# Patient Record
Sex: FEMALE | Race: WHITE | NOT HISPANIC OR LATINO | URBAN - METROPOLITAN AREA
[De-identification: names, ages, dates, MRNs, and addresses within clinical notes are randomized per-mention and may not be internally consistent; named-entity substitution may affect disease eponyms.]

---

## 2024-03-19 ENCOUNTER — OUTPATIENT (OUTPATIENT)
Dept: OUTPATIENT SERVICES | Facility: HOSPITAL | Age: 69
LOS: 1 days | Discharge: STILL ADMITTED OR ADMINISTRATIVE | End: 2024-03-19
Payer: MEDICARE

## 2024-03-19 PROCEDURE — 90839 PSYTX CRISIS INITIAL 60 MIN: CPT

## 2024-03-21 ENCOUNTER — INPATIENT (INPATIENT)
Facility: HOSPITAL | Age: 69
LOS: 35 days | Discharge: HOME | End: 2024-04-26
Attending: PSYCHIATRY & NEUROLOGY | Admitting: PSYCHIATRY & NEUROLOGY
Payer: MEDICARE

## 2024-03-21 VITALS
DIASTOLIC BLOOD PRESSURE: 80 MMHG | SYSTOLIC BLOOD PRESSURE: 139 MMHG | HEART RATE: 86 BPM | RESPIRATION RATE: 18 BRPM | TEMPERATURE: 98 F | OXYGEN SATURATION: 99 %

## 2024-03-21 DIAGNOSIS — F33.2 MAJOR DEPRESSIVE DISORDER, RECURRENT SEVERE WITHOUT PSYCHOTIC FEATURES: ICD-10-CM

## 2024-03-21 LAB
ALBUMIN SERPL ELPH-MCNC: 4.3 G/DL — SIGNIFICANT CHANGE UP (ref 3.3–5)
ALP SERPL-CCNC: 82 U/L — SIGNIFICANT CHANGE UP (ref 40–120)
ALT FLD-CCNC: 21 U/L — SIGNIFICANT CHANGE UP (ref 4–33)
AMPHET UR-MCNC: NEGATIVE — SIGNIFICANT CHANGE UP
ANION GAP SERPL CALC-SCNC: 12 MMOL/L — SIGNIFICANT CHANGE UP (ref 7–14)
APAP SERPL-MCNC: <10 UG/ML — LOW (ref 15–25)
APPEARANCE UR: CLEAR — SIGNIFICANT CHANGE UP
AST SERPL-CCNC: 19 U/L — SIGNIFICANT CHANGE UP (ref 4–32)
BARBITURATES UR SCN-MCNC: NEGATIVE — SIGNIFICANT CHANGE UP
BASOPHILS # BLD AUTO: 0.04 K/UL — SIGNIFICANT CHANGE UP (ref 0–0.2)
BASOPHILS NFR BLD AUTO: 0.7 % — SIGNIFICANT CHANGE UP (ref 0–2)
BENZODIAZ UR-MCNC: POSITIVE
BILIRUB SERPL-MCNC: 0.5 MG/DL — SIGNIFICANT CHANGE UP (ref 0.2–1.2)
BILIRUB UR-MCNC: NEGATIVE — SIGNIFICANT CHANGE UP
BUN SERPL-MCNC: 26 MG/DL — HIGH (ref 7–23)
CALCIUM SERPL-MCNC: 9.8 MG/DL — SIGNIFICANT CHANGE UP (ref 8.4–10.5)
CHLORIDE SERPL-SCNC: 106 MMOL/L — SIGNIFICANT CHANGE UP (ref 98–107)
CO2 SERPL-SCNC: 25 MMOL/L — SIGNIFICANT CHANGE UP (ref 22–31)
COCAINE METAB.OTHER UR-MCNC: NEGATIVE — SIGNIFICANT CHANGE UP
COLOR SPEC: SIGNIFICANT CHANGE UP
CREAT SERPL-MCNC: 1.04 MG/DL — SIGNIFICANT CHANGE UP (ref 0.5–1.3)
CREATININE URINE RESULT, DAU: 163 MG/DL — SIGNIFICANT CHANGE UP
DIFF PNL FLD: NEGATIVE — SIGNIFICANT CHANGE UP
EGFR: 59 ML/MIN/1.73M2 — LOW
EOSINOPHIL # BLD AUTO: 0.1 K/UL — SIGNIFICANT CHANGE UP (ref 0–0.5)
EOSINOPHIL NFR BLD AUTO: 1.7 % — SIGNIFICANT CHANGE UP (ref 0–6)
ETHANOL SERPL-MCNC: <10 MG/DL — SIGNIFICANT CHANGE UP
GLUCOSE SERPL-MCNC: 118 MG/DL — HIGH (ref 70–99)
GLUCOSE UR QL: NEGATIVE MG/DL — SIGNIFICANT CHANGE UP
HCT VFR BLD CALC: 36 % — SIGNIFICANT CHANGE UP (ref 34.5–45)
HGB BLD-MCNC: 12.6 G/DL — SIGNIFICANT CHANGE UP (ref 11.5–15.5)
IANC: 3.95 K/UL — SIGNIFICANT CHANGE UP (ref 1.8–7.4)
IMM GRANULOCYTES NFR BLD AUTO: 0.5 % — SIGNIFICANT CHANGE UP (ref 0–0.9)
KETONES UR-MCNC: ABNORMAL MG/DL
LEUKOCYTE ESTERASE UR-ACNC: NEGATIVE — SIGNIFICANT CHANGE UP
LYMPHOCYTES # BLD AUTO: 1.26 K/UL — SIGNIFICANT CHANGE UP (ref 1–3.3)
LYMPHOCYTES # BLD AUTO: 21.8 % — SIGNIFICANT CHANGE UP (ref 13–44)
MCHC RBC-ENTMCNC: 30.4 PG — SIGNIFICANT CHANGE UP (ref 27–34)
MCHC RBC-ENTMCNC: 35 GM/DL — SIGNIFICANT CHANGE UP (ref 32–36)
MCV RBC AUTO: 86.7 FL — SIGNIFICANT CHANGE UP (ref 80–100)
METHADONE UR-MCNC: POSITIVE
MONOCYTES # BLD AUTO: 0.41 K/UL — SIGNIFICANT CHANGE UP (ref 0–0.9)
MONOCYTES NFR BLD AUTO: 7.1 % — SIGNIFICANT CHANGE UP (ref 2–14)
NEUTROPHILS # BLD AUTO: 3.95 K/UL — SIGNIFICANT CHANGE UP (ref 1.8–7.4)
NEUTROPHILS NFR BLD AUTO: 68.2 % — SIGNIFICANT CHANGE UP (ref 43–77)
NITRITE UR-MCNC: NEGATIVE — SIGNIFICANT CHANGE UP
NRBC # BLD: 0 /100 WBCS — SIGNIFICANT CHANGE UP (ref 0–0)
NRBC # FLD: 0 K/UL — SIGNIFICANT CHANGE UP (ref 0–0)
OPIATES UR-MCNC: NEGATIVE — SIGNIFICANT CHANGE UP
OXYCODONE UR-MCNC: NEGATIVE — SIGNIFICANT CHANGE UP
PCP SPEC-MCNC: SIGNIFICANT CHANGE UP
PCP UR-MCNC: NEGATIVE — SIGNIFICANT CHANGE UP
PH UR: 6 — SIGNIFICANT CHANGE UP (ref 5–8)
PLATELET # BLD AUTO: 244 K/UL — SIGNIFICANT CHANGE UP (ref 150–400)
POTASSIUM SERPL-MCNC: 3.9 MMOL/L — SIGNIFICANT CHANGE UP (ref 3.5–5.3)
POTASSIUM SERPL-SCNC: 3.9 MMOL/L — SIGNIFICANT CHANGE UP (ref 3.5–5.3)
PROT SERPL-MCNC: 7 G/DL — SIGNIFICANT CHANGE UP (ref 6–8.3)
PROT UR-MCNC: NEGATIVE MG/DL — SIGNIFICANT CHANGE UP
RBC # BLD: 4.15 M/UL — SIGNIFICANT CHANGE UP (ref 3.8–5.2)
RBC # FLD: 11.9 % — SIGNIFICANT CHANGE UP (ref 10.3–14.5)
SALICYLATES SERPL-MCNC: <0.3 MG/DL — LOW (ref 15–30)
SARS-COV-2 RNA SPEC QL NAA+PROBE: SIGNIFICANT CHANGE UP
SODIUM SERPL-SCNC: 143 MMOL/L — SIGNIFICANT CHANGE UP (ref 135–145)
SP GR SPEC: 1.02 — SIGNIFICANT CHANGE UP (ref 1–1.03)
THC UR QL: NEGATIVE — SIGNIFICANT CHANGE UP
TOXICOLOGY SCREEN, DRUGS OF ABUSE, SERUM RESULT: SIGNIFICANT CHANGE UP
TSH SERPL-MCNC: 0.71 UIU/ML — SIGNIFICANT CHANGE UP (ref 0.27–4.2)
UROBILINOGEN FLD QL: 1 MG/DL — SIGNIFICANT CHANGE UP (ref 0.2–1)
WBC # BLD: 5.79 K/UL — SIGNIFICANT CHANGE UP (ref 3.8–10.5)
WBC # FLD AUTO: 5.79 K/UL — SIGNIFICANT CHANGE UP (ref 3.8–10.5)

## 2024-03-21 PROCEDURE — 99285 EMERGENCY DEPT VISIT HI MDM: CPT

## 2024-03-21 RX ORDER — CLONAZEPAM 1 MG
0.25 TABLET ORAL ONCE
Refills: 0 | Status: DISCONTINUED | OUTPATIENT
Start: 2024-03-21 | End: 2024-03-21

## 2024-03-21 RX ORDER — TRAZODONE HCL 50 MG
100 TABLET ORAL AT BEDTIME
Refills: 0 | Status: DISCONTINUED | OUTPATIENT
Start: 2024-03-21 | End: 2024-03-22

## 2024-03-21 RX ORDER — HYDROXYZINE HCL 10 MG
10 TABLET ORAL ONCE
Refills: 0 | Status: COMPLETED | OUTPATIENT
Start: 2024-03-21 | End: 2024-03-21

## 2024-03-21 RX ORDER — CLONAZEPAM 1 MG
1 TABLET ORAL THREE TIMES A DAY
Refills: 0 | Status: DISCONTINUED | OUTPATIENT
Start: 2024-03-21 | End: 2024-03-22

## 2024-03-21 RX ADMIN — Medication 10 MILLIGRAM(S): at 15:08

## 2024-03-21 RX ADMIN — Medication 0.25 MILLIGRAM(S): at 15:11

## 2024-03-21 RX ADMIN — Medication 100 MILLIGRAM(S): at 21:48

## 2024-03-21 RX ADMIN — Medication 10 MILLIGRAM(S): at 15:09

## 2024-03-21 RX ADMIN — Medication 1 MILLIGRAM(S): at 21:47

## 2024-03-21 NOTE — ED BEHAVIORAL HEALTH ASSESSMENT NOTE - HPI (INCLUDE ILLNESS QUALITY, SEVERITY, DURATION, TIMING, CONTEXT, MODIFYING FACTORS, ASSOCIATED SIGNS AND SYMPTOMS)
69 year old female, , no children, retired 3 years ago (was ) and domiciled with  in a private residence. PPH: Hx of Depression and LEO, engaged in outpatient tx with Dr Schmid x20+ years. No previous psychiatric admissions, denies previous suicide attempts, denies NSSIB. PMH: HLD, HTN, IBS, BONIFACIO has CPAP notes not used in last couple weeks due to recent cold. Allergies: Erythromycin. Pt denies substance/ETOH use. Pt denies legal issues and denies hx of violence/aggression. Denies access to firearms.    Family hx: mother with hx of anxiety and depression, niece with hx of psychiatric admission for anxiety and depression and another niece with anxiety and depression.    [facility], reporting worsening symptoms over the last year more so in the last couple of months,  reports she has been requesting medication adjustments with current psychiatrist due to feeling medications have not been effective.    Pt presents as alert and oriented x3. Presents with depressed mood, constricted affect. Fairly kept, appropriately dressed. Pt presents as restless, notes she is uncertain if she can sit in an emergency room at this time. Does not present with perceptual disturbance. Presents with fair insight and judgement.    Pt reports she has been increasingly anxious, perseverative, unable to relax, restless with difficulty showering; missing showers multiple x/week. Pt reports agoraphobia, anhedonia and disrupted sleep and appetite. Pt denies past and present suicidal and homicidal ideation, intent and plan. 69 year old female, , no children, retired 3 years ago (was ) and domiciled with  in a private residence. PPH: Hx of Depression and LEO, engaged in outpatient tx with Dr Schmid x20+ years. No previous psychiatric admissions, denies previous suicide attempts, denies NSSIB. PMH: HLD, HTN, IBS, BONIFACIO has CPAP, Pt denies substance/ETOH use. Pt denies legal issues and denies hx of violence/aggression. Denies access to firearms, referred by [facility] for poor functioning due to increased anxiety and depression.     At [facility], pt was seeking admission for worsening anxiety and depression over the last couple of months. She reported that current meds are not effective. Pt reported feeling restless.   Pt reported feeling increasingly anxious, perseverative, unable to relax, restless with difficulty showering; missing showers multiple x/week. Pt reported agoraphobia, anhedonia and disrupted sleep and appetite. Pt denied past and present suicidal and homicidal ideation, intent and plan.    On interview in ED, pt confirms the collateral above. She is unable to identify acute stressors. States she is anxious to the point where she is afraid to go outside. She is showering infrequently. She reports anhedonia. She has been taking prn xanax 2-3 times daily and prn klonopin 2-3 times daily. She reports full medication compliance. She denies SI/HI. She denies manic or psychotic symptoms. She feels she can't function at home.

## 2024-03-21 NOTE — ED BEHAVIORAL HEALTH ASSESSMENT NOTE - DESCRIPTION
see HPI in behavioral control  Vital Signs Last 24 Hrs  T(C): 36.6 (21 Mar 2024 12:40), Max: 36.6 (21 Mar 2024 12:40)  T(F): 97.9 (21 Mar 2024 12:40), Max: 97.9 (21 Mar 2024 12:40)  HR: 86 (21 Mar 2024 12:40) (86 - 86)  BP: 139/80 (21 Mar 2024 12:40) (139/80 - 139/80)  BP(mean): --  RR: 18 (21 Mar 2024 12:40) (18 - 18)  SpO2: 99% (21 Mar 2024 12:40) (99% - 99%)    Parameters below as of 21 Mar 2024 12:40  Patient On (Oxygen Delivery Method): room air

## 2024-03-21 NOTE — ED BEHAVIORAL HEALTH ASSESSMENT NOTE - ADDITIONAL DETAILS ALL
LMOM with details of staying hydrated    ----- Message from Isabell Lama MD sent at 4/21/2023 10:41 AM EDT -----  Regarding: FW: MRI RESULTS  Ok  advise him to stay well hydrated  ----- Message -----  From: Rebeca Shelby MA  Sent: 4/21/2023  10:07 AM EDT  To: Isabell Lama MD  Subject: RE: MRI RESULTS                                  PT WANTED YOU TO KNOW HE HAD AN MRI AT [facility] AND HE HAS A KIDNEY STONE 11MM. HE HAS A F/U WITH THEM ON Monday. HE IS NOT SURE IF THEY ARE DOING A PROCEDURE OR NOT. THEY DID ADVISE HIM IF HE HAS PAIN OVER THE WEEKEND TO GO TO THE ER. HE WANTED YOU TO BE IN THE LOOP. HE IS HAVING THE REPORT SENT OVER.   ----- Message -----  From: Lo Granda  Sent: 4/21/2023   9:24 AM EDT  To: Rebeca Shelby MA  Subject: MRI RESULTS                                      Patient phoned wanting to speak to someone about MRI results I made him aware that we did not have them pt still wanted to speak to you told him I would send you a message. Thank you          
denies

## 2024-03-21 NOTE — ED ADULT TRIAGE NOTE - CHIEF COMPLAINT QUOTE
c/o feeling anxious and depressed sent in form [facility] crisis clinic for admission., pt denies S/I, H/I or A/V/H.

## 2024-03-21 NOTE — ED BEHAVIORAL HEALTH ASSESSMENT NOTE - SUMMARY
69 year old female, , no children, retired 3 years ago (was ) and domiciled with  in a private residence. PPH: Hx of Depression and LEO, engaged in outpatient tx with Dr Schmid x20+ years. No previous psychiatric admissions, denies previous suicide attempts, denies NSSIB. PMH: HLD, HTN, IBS, BONIFACIO has CPAP, Pt denies substance/ETOH use. Pt denies legal issues and denies hx of violence/aggression. Denies access to firearms, referred by [facility] for poor functioning due to increased anxiety and depression. Pt requests and meets criteria for voluntary psychiatric admission for stabilization.

## 2024-03-21 NOTE — ED ADULT NURSE NOTE - OBJECTIVE STATEMENT
Hx: depression/anxiety. Pt from [facility] crisis center. Pt endorsing desire for voluntary admission due to extreme anxiety. Pt not caring for self appropriately, isolating to her home due to fear of being too weak to walk outside like her and her  used to. Pt calm and cooperative, NAD, reps equal and unlabored. Denies; SIB, S/I, H/I, hallucinations, insomnia, ETOH/drug use.

## 2024-03-21 NOTE — ED ADULT NURSE NOTE - CHIEF COMPLAINT QUOTE
c/o feeling anxious and depressed sent in form [facility] crisis clinic for admission., pt denies S/I, H/I or A/V/H.

## 2024-03-21 NOTE — ED PROVIDER NOTE - NSFOLLOWUPINSTRUCTIONS_ED_ALL_ED_FT
Follow up with your PMD within 48-72 hrs. Show copies of your reports given to you.   Worsening, continued or new concerning symptoms return to the emergency department.    You have been given information necessary to follow up with the  [facility] ([facility]) Crisis center & other outpatient  psychiatric clinics within your community    • [facility] walk in Crisis centre  [address] [address]  [phone] [email or web address]  Hours of operation:  Day	                                        Hours  Sunday                                  Closed  Monday                                9am - 3pm  Tuesday                                9am - 3pm  Wednesday                          9am - 3pm  Thursday                               9am - 3pm  Friday                                    9am - 3pm  Saturday                                Closed

## 2024-03-21 NOTE — ED PROVIDER NOTE - PROGRESS NOTE DETAILS
MAGALY: Pt was signed out to me pending admission/transfer to an inpatient psych facility. Pt has no complaints. Will continue to monitor pending bed availability. DD ED ATTG: rec'd s/o from Dr Mclain.  69F sent from [facility] walk in due to not functioning well due to psych issues.  Plan admission.  Psych accepted pt

## 2024-03-21 NOTE — ED BEHAVIORAL HEALTH ASSESSMENT NOTE - DETAILS
denies informed  mother with hx of anxiety and depression, niece with hx of psychiatric admission for anxiety and depression and another niece with anxiety and depression. per protocol

## 2024-03-21 NOTE — ED BEHAVIORAL HEALTH ASSESSMENT NOTE - CURRENT MEDICATION
Rexulti 1.5 mg OD, Alprazolam .25 mg PRN TID, Clonazepam .5mg 1 and 1/2 tab PRN daily, Buspirone 10 mg TID, Hydroxyzine 10 mg BID, Trazodone 100 mg at bed time, Trintellix 10 mg at bed time.    Amlodipine 5 mg, OD, Aspirin 81 mg, Losartan 100 mg OD, Hydrochlorothiazide 25 mg OD, Pravastatin 20 mg OD, B complex with vitamin C and multivitamin. Rexulti 1.5 mg po daily, Alprazolam 0.25 mg TID, Clonazepam 0.75 mg po TID, Buspirone 10 mg po TID, Hydroxyzine 10 mg po BID, Trazodone 100 mg po QHS, Trintellix 10 mg po QHS  Amlodipine 5 mg po daily, Aspirin 81 mg po daily, Losartan 100 mg po daily, Hydrochlorothiazide 25 mg po daily, Pravastatin 20 mg po daily, B complex with vitamin C and multivitamin. Rexulti 1.5 mg po daily, Alprazolam 0.25 mg TID, Clonazepam 0.75 mg po TID, Buspirone 10 mg po afternoon, Buspirone 20 mg po daily, Hydroxyzine 10 mg po TID, Trazodone 100 mg po QHS, Trintellix 10 mg po QHS  Amlodipine 5 mg po daily, Aspirin 81 mg po daily, Losartan 100 mg po daily, Hydrochlorothiazide 25 mg po daily, Pravastatin 20 mg po daily, B complex with vitamin C and multivitamin.

## 2024-03-21 NOTE — ED PROVIDER NOTE - CLINICAL SUMMARY MEDICAL DECISION MAKING FREE TEXT BOX
68 yo F PMH MDD, LEO p/w increased anxiety and feelings of depression expressing she is unable to function. Sees her psychiatrist x1 year and has been compliant every month. Reports compliance with medications but doesn't help. Denies fever, headache, dizziness, SI/HI/AH/VH, chills, chest pain, shortness of breath, abdominal pain, sick contact or recent travel. Denies alcohol use or other drugs.   Labs, EKG, COVID  SW collateral  Psych consult  Dispo as per collateral

## 2024-03-21 NOTE — ED ADULT NURSE REASSESSMENT NOTE - NS ED NURSE REASSESS COMMENT FT1
break coverage rn. received report from LUIS holloway. A&Ox4, ambulatory. denies complaints at this time. safety maintained

## 2024-03-21 NOTE — ED BEHAVIORAL HEALTH ASSESSMENT NOTE - NSBHMSEKNOW_PSY_A_CORE
DISPLAY PLAN FREE TEXT Patient: Aneesh Alexander    Procedure Summary     Date:  02/07/20 Room / Location:  [facility] OR [address] [place] [address] PAD OR    Anesthesia Start:  0911 Anesthesia Stop:  1029    Procedure:  EXCISION BASAL CELL CARCINOMA - FOREHEAD; RIGHT CHEEK WITH FROZEN SECTION AND RECONSTRUCTION  AND LEFT EAR LESION EXCION (Bilateral ) Diagnosis:  (BASAL CELL CARCINOMA - FOREHEAD; RIGHT CHEEK; LEFT EAR)    Surgeon:  Leodan Jones MD Provider:  Leodan Oakley CRNA    Anesthesia Type:  general ASA Status:  2          Anesthesia Type: general    Vitals  Vitals Value Taken Time   /66 2/7/2020 10:35 AM   Temp 97.5 °F (36.4 °C) 2/7/2020 10:27 AM   Pulse 72 2/7/2020 10:38 AM   Resp 16 2/7/2020 10:31 AM   SpO2 95 % 2/7/2020 10:38 AM   Vitals shown include unvalidated device data.        Anesthesia Post Evaluation   Normal

## 2024-03-21 NOTE — ED BEHAVIORAL HEALTH NOTE - BEHAVIORAL HEALTH NOTE
As per provider, worker called patient’s spouse Kevin ([phone]) for collateral information. All information is as per pt’s kevin:    Patient is a 69-year-old female, domiciled with spouse, with a hx of depression and anxiety, bib as walk in for worsening depression and anxiety. Kevin states that he called the [facility] [place] earlier this morning and was informed that there was a geriatric bed for patient for admission, but it could not be held. He states that he brought the patient to the crisis center for admission and the patient met with a  who said there was no bed. He states that the patient’s doctor recommended that the patient come in for admission. He states that the patient is connected to psychiatrist Dr. dariel ayala ([phone]/beeper [phone]). He states that the patient has never had any past psychiatric admissions and is retired. He states that the patient has no past SA/SI/HI/AH/VH. Patient is not violence or aggression. Patient has been sleeping and her eating has recently come back to baseline. Patient is very nervous and anxious. Patient is not using any drugs or alcohol. Patient has no medical issues. Patient will not go for a haircut because she is too nervous. Patient has sleep apnea and uses her cpap on and off. Patient last used the cpap a month ago. He states he provided a note with medications to the nurse. Patient’s outpatient psychiatrist has been adjusting her medications and it is not working for the patient. He states that has been worsened for the last 3-4 days. He states that the patient has been staring at the Television and does not want to go outside. Patient has a family history of mental illness – her mother, aunt, and two nieces who all suffer with anxiety. Patient has no access to guns. No beds at [facility].

## 2024-03-21 NOTE — ED BEHAVIORAL HEALTH ASSESSMENT NOTE - PSYCHIATRIC ISSUES AND PLAN (INCLUDE STANDING AND PRN MEDICATION)
Discontinue Xanax and increase Klonopin to 1 mg po TID; defer other medication changes to inpatient team; continue Rexulti 1.5 mg po daily, Buspirone 10 mg po TID, Hydroxyzine 10 mg po BID, Trazodone 100 mg po QHS, Trintellix 10 mg po QHS Discontinue Xanax and increase Klonopin to 1 mg po TID; defer other medication changes to inpatient team; continue Rexulti 1.5 mg po daily, Buspirone 10 mg po qafternoon, Buspirone 20 mg po daily, Hydroxyzine 10 mg po TID, Trazodone 100 mg po QHS, Trintellix 10 mg po QHS

## 2024-03-22 DIAGNOSIS — F41.9 ANXIETY DISORDER, UNSPECIFIED: ICD-10-CM

## 2024-03-22 DIAGNOSIS — F32.9 MAJOR DEPRESSIVE DISORDER, SINGLE EPISODE, UNSPECIFIED: ICD-10-CM

## 2024-03-22 PROCEDURE — 99212 OFFICE O/P EST SF 10 MIN: CPT | Mod: GC

## 2024-03-22 RX ORDER — HYDROXYZINE HCL 10 MG
10 TABLET ORAL THREE TIMES A DAY
Refills: 0 | Status: DISCONTINUED | OUTPATIENT
Start: 2024-03-22 | End: 2024-03-22

## 2024-03-22 RX ORDER — MAGNESIUM HYDROXIDE 400 MG/1
30 TABLET, CHEWABLE ORAL DAILY
Refills: 0 | Status: DISCONTINUED | OUTPATIENT
Start: 2024-03-22 | End: 2024-04-26

## 2024-03-22 RX ORDER — TRAZODONE HCL 50 MG
100 TABLET ORAL AT BEDTIME
Refills: 0 | Status: DISCONTINUED | OUTPATIENT
Start: 2024-03-22 | End: 2024-04-26

## 2024-03-22 RX ORDER — BREXPIPRAZOLE 0.25 MG/1
1.5 TABLET ORAL DAILY
Refills: 0 | Status: DISCONTINUED | OUTPATIENT
Start: 2024-03-22 | End: 2024-04-11

## 2024-03-22 RX ORDER — HYDROXYZINE HCL 10 MG
25 TABLET ORAL ONCE
Refills: 0 | Status: COMPLETED | OUTPATIENT
Start: 2024-03-22 | End: 2024-03-22

## 2024-03-22 RX ORDER — CLONAZEPAM 1 MG
1 TABLET ORAL THREE TIMES A DAY
Refills: 0 | Status: DISCONTINUED | OUTPATIENT
Start: 2024-03-22 | End: 2024-03-28

## 2024-03-22 RX ORDER — HYDROXYZINE HCL 10 MG
25 TABLET ORAL THREE TIMES A DAY
Refills: 0 | Status: DISCONTINUED | OUTPATIENT
Start: 2024-03-22 | End: 2024-04-04

## 2024-03-22 RX ORDER — ACETAMINOPHEN 500 MG
650 TABLET ORAL EVERY 6 HOURS
Refills: 0 | Status: DISCONTINUED | OUTPATIENT
Start: 2024-03-22 | End: 2024-04-26

## 2024-03-22 RX ADMIN — Medication 20 MILLIGRAM(S): at 07:37

## 2024-03-22 RX ADMIN — Medication 1 MILLIGRAM(S): at 20:25

## 2024-03-22 RX ADMIN — Medication 100 MILLIGRAM(S): at 20:26

## 2024-03-22 RX ADMIN — Medication 25 MILLIGRAM(S): at 20:26

## 2024-03-22 RX ADMIN — Medication 25 MILLIGRAM(S): at 15:35

## 2024-03-22 RX ADMIN — Medication 1 MILLIGRAM(S): at 06:09

## 2024-03-22 NOTE — BH INPATIENT PSYCHIATRY ASSESSMENT NOTE - NSICDXPASTMEDICALHX_GEN_ALL_CORE_FT
PAST MEDICAL HISTORY:  History of retinal hemorrhage     Hyperlipidemia     Hypertension     Irritable bowel disease     MDD (major depressive disorder)     Obstructive sleep apnea

## 2024-03-22 NOTE — BH INPATIENT PSYCHIATRY ASSESSMENT NOTE - OTHER PAST PSYCHIATRIC HISTORY (INCLUDE DETAILS REGARDING ONSET, COURSE OF ILLNESS, INPATIENT/OUTPATIENT TREATMENT)
Patient reports history of anxiety and depression. States she has been in "remission" for years. Currently in outpatient treatment with psychiatrist Chester Schmid ([phone]) and therapist Cole Romero ([phone]) who she sees virtually. Denies inpatient psychiatric hospitalizations. Denies history of suicide attempts or self-injurious behavior. No history of AVH. No legal history. No known history of violence. Denies history of trauma. No access to weapons/firearms.

## 2024-03-22 NOTE — ED ADULT NURSE REASSESSMENT NOTE - NS ED NURSE REASSESS COMMENT FT1
Received report from night RN pt calm c/o depression denies si/hi/avh presently, pt awaiting bed assignment eval on going.

## 2024-03-22 NOTE — ED BEHAVIORAL HEALTH PROGRESS NOTE - NSBHATTESTCOMMENTATTENDFT_PSY_A_CORE
69 year old female, , no children, retired 3 years ago (was ) and domiciled with  in a private residence. PPH: Hx of Depression and LEO, engaged in outpatient tx with Dr Schmid x20+ years. No previous psychiatric admissions, denies previous suicide attempts, denies NSSIB. PMH: HLD, HTN, IBS, BONIFACIO has CPAP, Pt denies substance/ETOH use. Pt denies legal issues and denies hx of violence/aggression. Denies access to firearms, referred by [facility] for poor functioning due to increased anxiety and depression. Pt requests and meets criteria for voluntary psychiatric admission for stabilization.

## 2024-03-22 NOTE — BH INPATIENT PSYCHIATRY ASSESSMENT NOTE - ACCESS TO FIREARM
MD ATTESTATION NOTE    SHARED VISIT: This visit was performed by BOTH a physician and an APC. The substantive portion of the medical decision making was performed by this attesting physician who made or approved the management plan and takes responsibility for patient management. All studies documented in the APC note (if performed) were independently interpreted by me.    The DELMY and I have discussed this patient's history, physical exam, MDM, and treatment plan.  I have reviewed the documentation and personally had a face to face interaction with the patient. The attached note describes my personal findings.      Bayron Acevedo is a 86 y.o. male who presents to the ED c/o acute hematuria. Has a hernia repair on Tuesday. Almodovar came out yesterday. Had urinary bleeding today. Otherwise has no complaints.     On exam:  GENERAL: not distressed  HENT: nares patent  EYES: no scleral icterus  CV: regular rhythm, regular rate  RESPIRATORY: normal effort  ABDOMEN: soft, nontender, well healing laparoscopic incisions  MUSCULOSKELETAL: no deformity  NEURO: alert, moves all extremities, follows commands  SKIN: warm, dry    Labs  Recent Results (from the past 24 hour(s))   Comprehensive Metabolic Panel    Collection Time: 03/09/24  1:05 PM    Specimen: Blood   Result Value Ref Range    Glucose 101 (H) 65 - 99 mg/dL    BUN 24 (H) 8 - 23 mg/dL    Creatinine 1.70 (H) 0.76 - 1.27 mg/dL    Sodium 139 136 - 145 mmol/L    Potassium 3.8 3.5 - 5.2 mmol/L    Chloride 99 98 - 107 mmol/L    CO2 27.3 22.0 - 29.0 mmol/L    Calcium 9.0 8.6 - 10.5 mg/dL    Total Protein 6.8 6.0 - 8.5 g/dL    Albumin 4.2 3.5 - 5.2 g/dL    ALT (SGPT) 6 1 - 41 U/L    AST (SGOT) 16 1 - 40 U/L    Alkaline Phosphatase 87 39 - 117 U/L    Total Bilirubin 0.5 0.0 - 1.2 mg/dL    Globulin 2.6 gm/dL    A/G Ratio 1.6 g/dL    BUN/Creatinine Ratio 14.1 7.0 - 25.0    Anion Gap 12.7 5.0 - 15.0 mmol/L    eGFR 39.0 (L) >60.0 mL/min/1.73   Urinalysis With Microscopic If Indicated  (No Culture) - Urine, Clean Catch    Collection Time: 03/09/24  1:05 PM    Specimen: Urine, Clean Catch   Result Value Ref Range    Color, UA Red (A) Yellow, Straw    Appearance, UA Clear Clear    pH, UA 6.0 5.0 - 8.0    Specific Gravity, UA 1.006 1.005 - 1.030    Glucose, UA Negative Negative    Ketones, UA Negative Negative    Bilirubin, UA Negative Negative    Blood, UA Large (3+) (A) Negative    Protein, UA Trace (A) Negative    Leuk Esterase, UA Trace (A) Negative    Nitrite, UA Negative Negative    Urobilinogen, UA 0.2 E.U./dL 0.2 - 1.0 E.U./dL   CBC Auto Differential    Collection Time: 03/09/24  1:05 PM    Specimen: Blood   Result Value Ref Range    WBC 7.20 3.40 - 10.80 10*3/mm3    RBC 4.66 4.14 - 5.80 10*6/mm3    Hemoglobin 11.8 (L) 13.0 - 17.7 g/dL    Hematocrit 38.0 37.5 - 51.0 %    MCV 81.5 79.0 - 97.0 fL    MCH 25.3 (L) 26.6 - 33.0 pg    MCHC 31.1 (L) 31.5 - 35.7 g/dL    RDW 16.6 (H) 12.3 - 15.4 %    RDW-SD 48.5 37.0 - 54.0 fl    MPV 10.2 6.0 - 12.0 fL    Platelets 146 140 - 450 10*3/mm3    Neutrophil % 70.1 42.7 - 76.0 %    Lymphocyte % 12.2 (L) 19.6 - 45.3 %    Monocyte % 7.6 5.0 - 12.0 %    Eosinophil % 9.4 (H) 0.3 - 6.2 %    Basophil % 0.4 0.0 - 1.5 %    Immature Grans % 0.3 0.0 - 0.5 %    Neutrophils, Absolute 5.04 1.70 - 7.00 10*3/mm3    Lymphocytes, Absolute 0.88 0.70 - 3.10 10*3/mm3    Monocytes, Absolute 0.55 0.10 - 0.90 10*3/mm3    Eosinophils, Absolute 0.68 (H) 0.00 - 0.40 10*3/mm3    Basophils, Absolute 0.03 0.00 - 0.20 10*3/mm3    Immature Grans, Absolute 0.02 0.00 - 0.05 10*3/mm3    nRBC 0.0 0.0 - 0.2 /100 WBC   Urinalysis, Microscopic Only - Urine, Clean Catch    Collection Time: 03/09/24  1:05 PM    Specimen: Urine, Clean Catch   Result Value Ref Range    RBC, UA Too Numerous to Count (A) None Seen, 0-2 /HPF    WBC, UA 3-5 (A) None Seen, 0-2 /HPF    Bacteria, UA None Seen None Seen /HPF    Squamous Epithelial Cells, UA 0-2 None Seen, 0-2 /HPF    Hyaline Casts, UA 0-2 None Seen /LPF     Methodology Automated Microscopy        Radiology  CT Abdomen Pelvis Without Contrast    Result Date: 3/9/2024  CT ABDOMEN PELVIS WO CONTRAST-  INDICATIONS: Hematuria. Recent hernia repair surgery.  TECHNIQUE: Radiation dose reduction techniques were utilized, including automated exposure control and exposure modulation based on body size. Unenhanced ABDOMEN AND PELVIS CT  COMPARISON: 1/23/2024  FINDINGS:  Small free intraperitoneal gas, for example axial image 35, although nonspecific, is probably related to the recent surgery. Fluid and gas are seen in the right inguinal hernia. A left inguinal hernia fat is noted.  Numerous colonic diverticula are seen that do not appear inflamed.    Exophytic hyperdense lesion of the left kidney appears stable. Small nonobstructive left renal hilar calcification may be arterial in location, no hydronephrosis. Asymmetric right renal atrophy is noted.  Small gas in the urinary bladder could be from catheterization or could be evidence of urinary infection. The prostate is enlarged, 5.4 cm.  Otherwise unremarkable appearance of the liver, spleen, adrenal glands, pancreas, kidneys, bladder.  No bowel obstruction or abnormal bowel thickening is identified.  No free intraperitoneal gas or free fluid.  Scattered small mesenteric and para-aortic lymph nodes are seen that are not significant by size criteria.  Abdominal aorta is aneurysmal, 5.6 cm on axial image 55, with aortobiiliac stents in place and (patency of which is not characterized without intravenous contrast material). Aortic and other arterial calcifications are present.  The lung bases show atelectasis/scarring with more consolidative opacity in the left lower lung, but appears grossly similar from 1/23/2024, though more prominent from 9/20/2023, follow-up advised as indicated to characterize continued stability. The heart size is borderline.  Degenerative changes are seen in the spine. No acute fracture is identified.           1. Small obstructive left renal calcification, may be arterial in location. No hydronephrosis. Gas in the urinary bladder, correlate clinically. In a patient this age with hematuria, follow-up evaluation with CT urogram is advised if not contraindicated to exclude possibility of an occult urinary lesion.  2. Colonic diverticulosis. No acute inflammatory process of bowel identified. Mild free intraperitoneal gas, probably related to recent surgery, but correlate clinically.  3. Atelectasis/scarring with more consolidative opacity in the left lower lung, but appears grossly similar from 1/23/2024, though more prominent from 9/20/2023, follow-up advised as indicated to characterize continued stability.  This report was finalized on 3/9/2024 3:32 PM by Dr. Bruce Johnson M.D on Workstation: [id number]       Medications given in the ED:  Medications - No data to display    Orders placed during this visit:  Orders Placed This Encounter   Procedures    CT Abdomen Pelvis Without Contrast    Comprehensive Metabolic Panel    Urinalysis With Microscopic If Indicated (No Culture) - Urine, Clean Catch    CBC Auto Differential    Urinalysis, Microscopic Only - Urine, Clean Catch    CBC & Differential       Medical Decision Making:  ED Course as of 03/09/24 2105   Sat Mar 09, 2024   1319 86-year-old male who presents today with hematuria.  He has had 2 Almodovar catheter placed on Tuesday and removed yesterday.  Noted hematuria last night and today.  We will check labs, urinalysis and a CT of the abdomen pelvis.  This very well could be related to trauma from the Almodovar catheter insertion since they did take multiple attempts. [MS]   1417 Reviewed pt's history and workup with Dr. Masters.  After a bedside evaluation, he agrees with the plan of care.     [MS]   1417 BUN(!): 24 [MS]   1417 Creatinine(!): 1.70  Bun & creatinine are baseline for patient [MS]   1556 Patient updated on workup done today which showed no acute  abnormalities.  Patient has voided on several occasions while in the ER and his urine is now a light yellow color and patient feels much better.  He will be discharged home with close follow-up with his primary care physician and urologist.  He was given strict return to ER precautions. [MS]      ED Course User Index  [MS] RodriguezAlina womack, APRN       Differential diagnosis:  UTI, Almodovar catheter trauma, ureteral stone      Incidental nodule identified on CT. He already is seen by Dr. Santos. PET scan ordered at the end of January.     Diagnosis  Final diagnoses:   Hematuria, unspecified type   Chronic kidney disease, unspecified CKD stage          Mario Masters II, MD  03/09/24 3257     No

## 2024-03-22 NOTE — BH PATIENT PROFILE - FUNCTIONAL ASSESSMENT - BASIC MOBILITY 4.
4 = No assist / stand by assistance PROVIDER:[TOKEN:[[id number]]],PROVIDER:[TOKEN:[[id number]]]

## 2024-03-22 NOTE — BH INPATIENT PSYCHIATRY ASSESSMENT NOTE - CURRENT MEDICATION
MEDICATIONS  (STANDING):  brexpiprazole 1.5 milliGRAM(s) Oral daily  busPIRone 20 milliGRAM(s) Oral <User Schedule>  busPIRone 10 milliGRAM(s) Oral <User Schedule>  clonazePAM  Tablet 1 milliGRAM(s) Oral three times a day  hydrOXYzine hydrochloride 10 milliGRAM(s) Oral three times a day  traZODone 100 milliGRAM(s) Oral at bedtime    MEDICATIONS  (PRN):  LORazepam   Injectable 2 milliGRAM(s) IntraMuscular Once PRN severe agitation   MEDICATIONS  (STANDING):  brexpiprazole 1.5 milliGRAM(s) Oral daily  busPIRone 20 milliGRAM(s) Oral <User Schedule>  busPIRone 10 milliGRAM(s) Oral <User Schedule>  clonazePAM  Tablet 1 milliGRAM(s) Oral three times a day  hydrOXYzine hydrochloride 25 milliGRAM(s) Oral three times a day  traZODone 100 milliGRAM(s) Oral at bedtime    MEDICATIONS  (PRN):  acetaminophen     Tablet .. 650 milliGRAM(s) Oral every 6 hours PRN Mild Pain (1 - 3), Moderate Pain (4 - 6), Severe Pain (7 - 10)  aluminum hydroxide/magnesium hydroxide/simethicone Suspension 30 milliLiter(s) Oral every 6 hours PRN Dyspepsia  LORazepam     Tablet 1 milliGRAM(s) Oral every 6 hours PRN Severe Anxiety/Agitation  LORazepam     Tablet 0.5 milliGRAM(s) Oral every 6 hours PRN Anxiety  LORazepam   Injectable 2 milliGRAM(s) IntraMuscular Once PRN severe agitation  magnesium hydroxide Suspension 30 milliLiter(s) Oral daily PRN Constipation

## 2024-03-22 NOTE — BH INPATIENT PSYCHIATRY ASSESSMENT NOTE - DESCRIPTION
69-year-old female, , no children/dependents. Domiciled in private residence with . Patient retired ~2-3 years ago (unable to specify) and was previously employed as a . Also used to work as a volunteer at [employer].

## 2024-03-22 NOTE — ED BEHAVIORAL HEALTH PROGRESS NOTE - PSYCHIATRIC ISSUES AND PLAN (INCLUDE STANDING AND PRN MEDICATION)
Discontinue Xanax and increase Klonopin to 1 mg po TID; defer other medication changes to inpatient team; continue Rexulti 1.5 mg po daily, Buspirone 10 mg po afternoon, Buspirone 20 mg po daily, Hydroxyzine 10 mg po TID, Trazodone 100 mg po QHS, Trintellix 10 mg po QHS

## 2024-03-22 NOTE — BH INPATIENT PSYCHIATRY ASSESSMENT NOTE - NSSUICPROTFACT_PSY_ALL_CORE
Responsibility to children, family, or others/Supportive social network of family or friends/Positive therapeutic relationships/Anglican beliefs

## 2024-03-22 NOTE — BH INPATIENT PSYCHIATRY ASSESSMENT NOTE - NSBHATTESTCOMMENTATTENDFT_PSY_A_CORE
Care was discussed and reviewed in the interdisciplinary treatment team.  I, Nuria Lanza MD, have reviewed and verified the documentation.  I independently performed the documented medical decision making.    Patient was seen and evaluated with the NP and NP student. Patient reported that she has been feeling sad and depressed. She verbalized trying multiple medications with no success. Patient said that she has not bee able to attend to her ADls or safely care for her self.  Patient said that for months she has not been able to use her CPAP. At this time patient said that she is not going to use the CPAP here in the unit. At this time she is in agreement with stating voluntarily and she is open to make medication changes.

## 2024-03-22 NOTE — ED BEHAVIORAL HEALTH PROGRESS NOTE - SUMMARY
Patient referred by [facility] for worsening anxiety and depression over the last couple of months. She reported that current meds are not effective. Pt reported feeling restless. Pt reported feeling increasingly anxious, perseverative, unable to relax, restless with difficulty showering; missing showers multiple x/week. Pt reported agoraphobia, anhedonia and disrupted sleep and appetite. Pt denied past and present suicidal and homicidal ideation, intent and plan Pt requests and meets criteria for voluntary psychiatric admission for stabilization.

## 2024-03-22 NOTE — ED BEHAVIORAL HEALTH PROGRESS NOTE - DETAILS:
Patient calm and cooperative. Continues to endorse depressed mood and anxiety but states anxiety is slight better. Patient states triggers for her current mood is recent death of mother and illness of her dog. Patient reports sleeping few hours. but reports adequate appetite. She denies any self-harm thoughts, passive or active SI/HI. Patient continues to be amenable for inpatient admission, awaiting for bed availability.

## 2024-03-22 NOTE — BH INPATIENT PSYCHIATRY ASSESSMENT NOTE - NSBHCHARTREVIEWVS_PSY_A_CORE FT
Vital Signs Last 24 Hrs  T(C): 36.7 (03-22-24 @ 12:45), Max: 37.1 (03-21-24 @ 23:15)  T(F): 98 (03-22-24 @ 12:45), Max: 98.8 (03-21-24 @ 23:15)  HR: 88 (03-22-24 @ 12:14) (82 - 99)  BP: 147/88 (03-22-24 @ 12:14) (130/82 - 147/88)  BP(mean): --  RR: 18 (03-22-24 @ 12:45) (16 - 18)  SpO2: 98% (03-22-24 @ 12:45) (98% - 99%)    Orthostatic VS  03-22-24 @ 12:45  Lying BP: --/-- HR: --  Sitting BP: 147/92 HR: 106  Standing BP: 138/87 HR: 112  Site: upper left arm  Mode: electronic   Vital Signs Last 24 Hrs  T(C): 36.7 (03-22-24 @ 12:45), Max: 37.1 (03-21-24 @ 23:15)  T(F): 98 (03-22-24 @ 12:45), Max: 98.8 (03-21-24 @ 23:15)  HR: 88 (03-22-24 @ 12:14) (82 - 99)  BP: 147/88 (03-22-24 @ 12:14) (130/82 - 147/88)  BP(mean): --  RR: 18 (03-22-24 @ 12:45) (17 - 18)  SpO2: 98% (03-22-24 @ 12:45) (98% - 99%)    Orthostatic VS  03-22-24 @ 12:45  Lying BP: --/-- HR: --  Sitting BP: 147/92 HR: 106  Standing BP: 138/87 HR: 112  Site: upper left arm  Mode: electronic

## 2024-03-22 NOTE — ED BEHAVIORAL HEALTH PROGRESS NOTE - STANDING MEDS RECEIVED IN ED DETAILS FREE TEXT
Buspirone 10 mg po afternoon, Buspirone 20 mg po daily, Hydroxyzine 10 mg po TID, Trazodone 100 mg po QHS

## 2024-03-22 NOTE — ED BEHAVIORAL HEALTH PROGRESS NOTE - CASE SUMMARY/FORMULATION (CLEARLY DOCUMENT RATIONALE FOR DISPOSITION CHANGE)
69 year old female, , no children, retired 3 years ago (was ) and domiciled with  in a private residence. PPH: Hx of Depression and LEO, engaged in outpatient tx with Dr Schmid x20+ years. No previous psychiatric admissions, denies previous suicide attempts, denies NSSIB. PMH: HLD, HTN, IBS, BONIFACIO has CPAP, Pt denies substance/ETOH use. Pt denies legal issues and denies hx of violence/aggression. Denies access to firearms, referred by [facility] for poor functioning due to increased anxiety and depression. Pt requests and meets criteria for voluntary psychiatric admission for stabilization.    Plan:  - Admit patient 9.13  - Discontinue Xanax and increase Klonopin to 1 mg po TID; defer other medication changes to inpatient team; continue Rexulti 1.5 mg po daily, Buspirone 10 mg po afternoon, Buspirone 20 mg po daily, Hydroxyzine 10 mg po TID, Trazodone 100 mg po QHS, Trintellix 10 mg po QHS  - Amlodipine 5 mg po daily, Aspirin 81 mg po daily, Losartan 100 mg po daily, Hydrochlorothiazide 25 mg po daily, Pravastatin 20 mg po daily, B complex with vitamin C and multivitamin.

## 2024-03-22 NOTE — ED BEHAVIORAL HEALTH PROGRESS NOTE - MEDICAL ISSUES AND PLAN (INCLUDE STANDING AND PRN MEDICATION)
Amlodipine 5 mg po daily, Aspirin 81 mg po daily, Losartan 100 mg po daily, Hydrochlorothiazide 25 mg po daily, Pravastatin 20 mg po daily, B complex with vitamin C and multivitamin.

## 2024-03-22 NOTE — BH INPATIENT PSYCHIATRY ASSESSMENT NOTE - NSBHCONSBHPROVDETAILS_PSY_A_CORE  FT
Left a voicemail with patient's outpatient psychiatrist, Chester Schmid at [phone] (per voicemail provider is only available M-F [id number])

## 2024-03-22 NOTE — BH INPATIENT PSYCHIATRY ASSESSMENT NOTE - NSBHASSESSSUMMFT_PSY_ALL_CORE
69-year-old female, , no children, retired 3 years ago (was ), domiciled with  in a private residence with . PPHx of Depression and LEO, engaged in outpatient tx with Dr Schmid x20+ years and therapist Cole Romero. No previous psychiatric admissions, denies previous suicide attempts, denies NSSIB. Pt denies all substance use. Pt denies legal issues and denies hx of violence/aggression. Denies access to firearms. Pertinent PMH HLD, HTN, IBS, BONIFACIO has CPAP (has not used for months). Referred by [facility] for poor functioning and inability to care for oneself due to worsening anxiety and depression.     Patient reports symptoms of depression including dysphoric mood, diminished interest in activities, and fatigue/little energy that prevents her from completing ADLs. She reports recent history of 20 lb weight loss "months ago" and states that she gained back 10 lbs. Additionally reports severe anxiety that prevents her from leaving the house, states that she last left her home ~1 month ago. Denies AVH. Denies SI/I/P. Displays insight into need for pharmacologic treatment for depression/anxiety.    Plan:  1. Admit to [place], 9.13 legal status.    2. Routine observation with q 15 minute checks. Patient denies suicidality, remains in behavioral control.    3. Psych: continue home medication regimen pending collateral.  Klonopin 1 mg TID  Rexulti 1.5 mg daily  Buspirone 20 mg daily  Buspirone 10 mg afternoon  Hydroxyzine 10 mg TID  Trazodone 100 mg QHS  Trintellix 10 mg po QHS  --PRN Ativan 2 mg IM for severe agitation    4. Medical: continue home medication regimen. Labs reviewed.  Amlodipine 5 mg daily   Aspirin 81 mg daily  Losartan 100 mg daily  Hydrochlorothiazide 25 mg po daily   Pravastatin 20 mg po daily  B complex/vitamin C/multivitamin    5. Encourage individual, group, and milieu therapy.    6. Collaboration with outpatient psychiatrist Dr. Chester Schmid, [phone].    7. Pending collateral from , Kevin, [phone].    8. Dispo collaboration with social work.    69-year-old female, , no children, retired 3 years ago (was ), domiciled with  in a private residence with . PPHx of Depression and LEO, engaged in outpatient tx with Dr Schmid x20+ years and therapist Cole Romero. No previous psychiatric admissions, denies previous suicide attempts, denies NSSIB. Pt denies all substance use. Pt denies legal issues and denies hx of violence/aggression. Denies access to firearms. Pertinent PMH HLD, HTN, IBS, BONIFACIO has CPAP (has not used for months). Referred by [facility] for poor functioning and inability to care for oneself due to worsening anxiety and depression.     Patient reports symptoms of depression including dysphoric mood, diminished interest in activities, and fatigue/little energy that prevents her from completing ADLs. She reports recent history of 20 lb weight loss "months ago" and states that she gained back 10 lbs. Additionally reports severe anxiety that prevents her from leaving the house, states that she last left her home ~1 month ago. Denies AVH. Denies SI/I/P. Displays insight into need for pharmacologic treatment for depression/anxiety.    Plan:  1. Admit to [facility] [place], 9.13 legal status.    2. Routine observation with q 15 minute checks. Patient denies suicidality, remains in behavioral control.    3. Psych: continue home medication regimen pending collateral.  Klonopin 1 mg TID  Rexulti 1.5 mg daily  Buspirone 20 mg daily  Buspirone 10 mg afternoon  Hydroxyzine 10 mg TID  Trazodone 100 mg QHS  Trintellix 10 mg po QHS  --PRN Ativan 2 mg IM for severe agitation    4. Medical: continue home medication regimen. Labs reviewed.  Amlodipine 5 mg daily   Aspirin 81 mg daily  Losartan 100 mg daily  Hydrochlorothiazide 25 mg po daily  Pravastatin 20 mg po daily  B complex/vitamin C/multivitamin    5. Encourage individual, group, and milieu therapy.    6. Collaboration with outpatient psychiatrist Dr. Chester Schmid, [phone].    7. Pending collateral from , Kevin, [phone].    8. Dispo collaboration with social work.

## 2024-03-22 NOTE — BH INPATIENT PSYCHIATRY ASSESSMENT NOTE - PAST PSYCHOTROPIC MEDICATION
Patient poor historian with medication reconciliaton, pending collateral from outpatient provider. Doses unknown, patient mentions previous use of:  Buspar (current), Effexor, Lexapro, Wellbutrin (became nervous), Abilify (restless legs) Patient poor historian with medication reconciliation, pending collateral from outpatient provider. Doses unknown, patient mentions previous use of:  Buspar (current), Effexor, Lexapro, Wellbutrin (became nervous), Abilify (restless legs), Trintellix, Pristiq, Vraylar, Rexulti (Current), Seroquel, Xanax, Klonopin, Atarax, Gabapentin, Cogentin, Trazodone, Sinequan, Zoloft,

## 2024-03-22 NOTE — BH INPATIENT PSYCHIATRY ASSESSMENT NOTE - NSBHMETABOLIC_PSY_ALL_CORE_FT
BMI: BMI (kg/m2): 33.6 (03-22-24 @ 12:45)  HbA1c:   Glucose:   BP: 147/88 (03-22-24 @ 12:14) (121/76 - 147/88)Vital Signs Last 24 Hrs  T(C): 36.7 (03-22-24 @ 12:45), Max: 37.1 (03-21-24 @ 23:15)  T(F): 98 (03-22-24 @ 12:45), Max: 98.8 (03-21-24 @ 23:15)  HR: 88 (03-22-24 @ 12:14) (82 - 99)  BP: 147/88 (03-22-24 @ 12:14) (130/82 - 147/88)  BP(mean): --  RR: 18 (03-22-24 @ 12:45) (16 - 18)  SpO2: 98% (03-22-24 @ 12:45) (98% - 99%)    Orthostatic VS  03-22-24 @ 12:45  Lying BP: --/-- HR: --  Sitting BP: 147/92 HR: 106  Standing BP: 138/87 HR: 112  Site: upper left arm  Mode: electronic    Lipid Panel:  BMI: BMI (kg/m2): 33.6 (03-22-24 @ 12:45)  HbA1c:   Glucose:   BP: 147/88 (03-22-24 @ 12:14) (121/76 - 147/88)Vital Signs Last 24 Hrs  T(C): 36.7 (03-22-24 @ 12:45), Max: 37.1 (03-21-24 @ 23:15)  T(F): 98 (03-22-24 @ 12:45), Max: 98.8 (03-21-24 @ 23:15)  HR: 88 (03-22-24 @ 12:14) (82 - 99)  BP: 147/88 (03-22-24 @ 12:14) (130/82 - 147/88)  BP(mean): --  RR: 18 (03-22-24 @ 12:45) (17 - 18)  SpO2: 98% (03-22-24 @ 12:45) (98% - 99%)    Orthostatic VS  03-22-24 @ 12:45  Lying BP: --/-- HR: --  Sitting BP: 147/92 HR: 106  Standing BP: 138/87 HR: 112  Site: upper left arm  Mode: electronic    Lipid Panel:

## 2024-03-22 NOTE — ED BEHAVIORAL HEALTH PROGRESS NOTE - RISK ASSESSMENT
Acute Risk: depressed mood/anhedonia, severe anxiety, agitation, panic,  global insomnia, change in treatment, triggering events leading to humiliation, shame and/or despair.     Chronic Risk: mood disorder, chronic medical illness.    Protective factors include: denies SI/I/P, no history of suicide attempts, no history of NSSIB, identifies reasons for living, no prior psychiatric admissions, no active substance use, no active psychosis, stable housing, dependent children, intact marriage, strong social supports,  positive therapeutic relationship, engaged in treatment, ability to cope with stress, high frustration tolerance, medication/follow up compliance, help-seeking behaviors, no legal history, adequate outpatient follow up with motivation to participate in care.

## 2024-03-22 NOTE — BH INPATIENT PSYCHIATRY ASSESSMENT NOTE - NSBHATTESTAPPBILLTIME_PSY_A_CORE
I attest my time as ROS is greater than 50% of the total combined time spent on qualifying patient care activities. I have reviewed and verified the documentation.

## 2024-03-22 NOTE — BH INPATIENT PSYCHIATRY ASSESSMENT NOTE - DETAILS
denies Mother with hx of anxiety and depression  Niece with hx of psychiatric admission for anxiety and depression  Another niece with anxiety and depression.

## 2024-03-22 NOTE — BH PATIENT PROFILE - STATED REASON FOR ADMISSION
Patient admitted from [facility] ED for worsening anxiety and depression, reports poor sleep, restlessness, poor ADLs, and fear of leaving house. Denies SIIP/HI/AVH or thoughts of self-harm. Skin intact.

## 2024-03-22 NOTE — ED ADULT NURSE REASSESSMENT NOTE - NS ED NURSE REASSESS COMMENT FT1
Writer informed that pt has a bed on [facility] [place] unit contacted as per receiving RN she is unable to take report unsure of when discharge is leaving SW made aware eval on going.

## 2024-03-22 NOTE — BH INPATIENT PSYCHIATRY ASSESSMENT NOTE - HPI (INCLUDE ILLNESS QUALITY, SEVERITY, DURATION, TIMING, CONTEXT, MODIFYING FACTORS, ASSOCIATED SIGNS AND SYMPTOMS)
Per NURIS  Assessment note: "69 year old female, , no children, retired 3 years ago (was ) and domiciled with  in a private residence. PPH: Hx of Depression and LEO, engaged in outpatient tx with Dr Schmid x20+ years. No previous psychiatric admissions, denies previous suicide attempts, denies NSSIB. PMH: HLD, HTN, IBS, BONIFACIO has CPAP, Pt denies substance/ETOH use. Pt denies legal issues and denies hx of violence/aggression. Denies access to firearms, referred by [facility] for poor functioning due to increased anxiety and depression.     At [facility], pt was seeking admission for worsening anxiety and depression over the last couple of months. She reported that current meds are not effective. Pt reported feeling restless.   Pt reported feeling increasingly anxious, perseverative, unable to relax, restless with difficulty showering; missing showers multiple x/week. Pt reported agoraphobia, anhedonia and disrupted sleep and appetite. Pt denied past and present suicidal and homicidal ideation, intent and plan.    On interview in ED, pt confirms the collateral above. She is unable to identify acute stressors. States she is anxious to the point where she is afraid to go outside. She is showering infrequently. She reports anhedonia. She has been taking prn xanax 2-3 times daily and prn Klonopin 2-3 times daily. She reports full medication compliance. She denies SI/HI. She denies manic or psychotic symptoms. She feels she can't function at home."    Upon admission to [facility], patient appears dysphoric, anxious. She immediately states that she is confused. Reports that she has been suffering with anxiety and depression for years but that her symptoms have slowly worsened since her dog became sick and passed away ~2 years ago (patient is unable to recall when her dog passed away). Symptoms of depression include dysphoric mood, diminished interest in activities stating "I don't want to do anything," and fatigue/little energy that prevents her from completing ADLs stating that she is only able to shower once per week. She reports recent history of 20 lb weight loss "months ago" and states that she gained back 10 lbs. Additionally reports severe anxiety that prevents her from leaving the house, states that she last left her home ~1 month ago. States that she has been trying to address these symptoms with her outpatient psychiatrist but that no medication adjustments were helping so he recommended that she go to the ED. Patient continuously reports feeling confused, is a poor historian in regard to past medications/treatment and timeline of some events including when she retired and when her dog passed away. Patient is A&O x4. She currently denies AVH. Denies SI/I/P, no history of suicidality. Displays insight into need for pharmacologic treatment for depression/anxiety.   Upon admission to [facility], patient appears dysphoric, anxious. She immediately states that she is confused. Reports that she has been suffering with anxiety and depression for years but that her symptoms have slowly worsened since her dog became sick and passed away ~2 years ago (patient is unable to recall when her dog passed away). Symptoms of depression include dysphoric mood, diminished interest in activities stating "I don't want to do anything," and fatigue/little energy that prevents her from completing ADLs stating that she is only able to shower once per week. She reports recent history of 20 lb weight loss "months ago" and states that she gained back 10 lbs. Additionally reports severe anxiety that prevents her from leaving the house, states that she last left her home ~1 month ago. States that she has been trying to address these symptoms with her outpatient psychiatrist but that no medication adjustments were helping so he recommended that she go to the ED. Patient continuously reports feeling confused, is a poor historian in regard to past medications/treatment and timeline of some events including when she retired and when her dog passed away. Patient is A&O x4. She currently denies AVH. Denies SI/I/P, no history of suicidality. Displays insight into need for pharmacologic treatment for depression/anxiety.    Collateral Dr Schmid: MD reported multiple medication failed medication trials, patient insisting on increasing dose then complaining of side effects. MD described significant OCD symptoms, irritability and agitation, inappropriate behavior at appointments. MD said patient was doing well when she was working.     Per [facility] Assessment note: "69 year old female, , no children, retired 3 years ago (was ) and domiciled with  in a private residence. PPH: Hx of Depression and LEO, engaged in outpatient tx with Dr Schmid x20+ years. No previous psychiatric admissions, denies previous suicide attempts, denies NSSIB. PMH: HLD, HTN, IBS, BONIFACIO has CPAP, Pt denies substance/ETOH use. Pt denies legal issues and denies hx of violence/aggression. Denies access to firearms, referred by [facility] for poor functioning due to increased anxiety and depression.     At [facility] [place], pt was seeking admission for worsening anxiety and depression over the last couple of months. She reported that current meds are not effective. Pt reported feeling restless.   Pt reported feeling increasingly anxious, perseverative, unable to relax, restless with difficulty showering; missing showers multiple x/week. Pt reported agoraphobia, anhedonia and disrupted sleep and appetite. Pt denied past and present suicidal and homicidal ideation, intent and plan.    On interview in ED, pt confirms the collateral above. She is unable to identify acute stressors. States she is anxious to the point where she is afraid to go outside. She is showering infrequently. She reports anhedonia. She has been taking prn xanax 2-3 times daily and prn Klonopin 2-3 times daily. She reports full medication compliance. She denies SI/HI. She denies manic or psychotic symptoms. She feels she can't function at home."

## 2024-03-22 NOTE — ED ADULT NURSE REASSESSMENT NOTE - NS ED NURSE REASSESS COMMENT FT1
1100 Report given to [facility] as per receiving RN bed not available until after 12pm pt made aware of admission eval on going.

## 2024-03-23 PROCEDURE — 99231 SBSQ HOSP IP/OBS SF/LOW 25: CPT

## 2024-03-23 RX ADMIN — Medication 25 MILLIGRAM(S): at 12:04

## 2024-03-23 RX ADMIN — Medication 1 MILLIGRAM(S): at 12:01

## 2024-03-23 RX ADMIN — Medication 20 MILLIGRAM(S): at 09:16

## 2024-03-23 RX ADMIN — BREXPIPRAZOLE 1.5 MILLIGRAM(S): 0.25 TABLET ORAL at 09:15

## 2024-03-23 RX ADMIN — Medication 25 MILLIGRAM(S): at 21:09

## 2024-03-23 RX ADMIN — Medication 100 MILLIGRAM(S): at 21:09

## 2024-03-23 RX ADMIN — Medication 25 MILLIGRAM(S): at 09:15

## 2024-03-23 RX ADMIN — Medication 0.5 MILLIGRAM(S): at 16:33

## 2024-03-23 RX ADMIN — Medication 10 MILLIGRAM(S): at 12:00

## 2024-03-23 RX ADMIN — Medication 1 MILLIGRAM(S): at 09:15

## 2024-03-23 RX ADMIN — Medication 1 MILLIGRAM(S): at 21:09

## 2024-03-23 NOTE — BH INPATIENT PSYCHIATRY PROGRESS NOTE - NSBHMETABOLIC_PSY_ALL_CORE_FT
BMI: BMI (kg/m2): 33.6 (03-22-24 @ 12:45)  HbA1c:   Glucose:   BP: 147/88 (03-22-24 @ 12:14) (121/76 - 147/88)Vital Signs Last 24 Hrs  T(C): 36.4 (03-23-24 @ 08:31), Max: 36.4 (03-23-24 @ 08:31)  T(F): 97.6 (03-23-24 @ 08:31), Max: 97.6 (03-23-24 @ 08:31)  HR: --  BP: --  BP(mean): --  RR: 16 (03-23-24 @ 08:31) (16 - 16)  SpO2: 100% (03-23-24 @ 08:31) (100% - 100%)    Orthostatic VS  03-23-24 @ 08:31  Lying BP: --/-- HR: --  Sitting BP: 135/79 HR: 79  Standing BP: 116/71 HR: 98  Site: --  Mode: --  Orthostatic VS  03-22-24 @ 12:45  Lying BP: --/-- HR: --  Sitting BP: 147/92 HR: 106  Standing BP: 138/87 HR: 112  Site: upper left arm  Mode: electronic    Lipid Panel:

## 2024-03-23 NOTE — BH INPATIENT PSYCHIATRY PROGRESS NOTE - NSBHCHARTREVIEWVS_PSY_A_CORE FT
Vital Signs Last 24 Hrs  T(C): 36.4 (03-23-24 @ 08:31), Max: 36.4 (03-23-24 @ 08:31)  T(F): 97.6 (03-23-24 @ 08:31), Max: 97.6 (03-23-24 @ 08:31)  HR: --  BP: --  BP(mean): --  RR: 16 (03-23-24 @ 08:31) (16 - 16)  SpO2: 100% (03-23-24 @ 08:31) (100% - 100%)    Orthostatic VS  03-23-24 @ 08:31  Lying BP: --/-- HR: --  Sitting BP: 135/79 HR: 79  Standing BP: 116/71 HR: 98  Site: --  Mode: --  Orthostatic VS  03-22-24 @ 12:45  Lying BP: --/-- HR: --  Sitting BP: 147/92 HR: 106  Standing BP: 138/87 HR: 112  Site: upper left arm  Mode: electronic

## 2024-03-23 NOTE — BH INPATIENT PSYCHIATRY PROGRESS NOTE - NSBHASSESSSUMMFT_PSY_ALL_CORE
69-year-old female, , no children, retired 3 years ago (was ), domiciled with  in a private residence with . PPHx of Depression and LEO, engaged in outpatient tx with Dr Schmid x20+ years and therapist Cole Romero. No previous psychiatric admissions, denies previous suicide attempts, denies NSSIB. Pt denies all substance use. Pt denies legal issues and denies hx of violence/aggression. Denies access to firearms. Pertinent PMH HLD, HTN, IBS, BONIFACIO has CPAP (has not used for months). Referred by [facility] for poor functioning and inability to care for oneself due to worsening anxiety and depression.   Patient reports symptoms of depression including dysphoric mood, diminished interest in activities, and fatigue/little energy that prevents her from completing ADLs. She reports recent history of 20 lb weight loss "months ago" and states that she gained back 10 lbs. Additionally reports severe anxiety that prevents her from leaving the house, states that she last left her home ~1 month ago. Denies AVH. Denies SI/I/P. Displays insight into need for pharmacologic treatment for depression/anxiety.    Plan:  1. Admit to [facility] [place], 9.13 legal status.    2. Routine observation with q 15 minute checks. Patient denies suicidality, remains in behavioral control.    3. Psych: continue home medication regimen pending collateral.  Klonopin 1 mg TID  Rexulti 1.5 mg daily  Buspirone 20 mg daily  Buspirone 10 mg afternoon  Hydroxyzine 10 mg TID  Trazodone 100 mg QHS  Trintellix 10 mg po QHS  --PRN Ativan 2 mg IM for severe agitation    4. Medical: continue home medication regimen. Labs reviewed.  Amlodipine 5 mg daily   Aspirin 81 mg daily  Losartan 100 mg daily  Hydrochlorothiazide 25 mg po daily  Pravastatin 20 mg po daily  B complex/vitamin C/multivitamin    5. Encourage individual, group, and milieu therapy.    6. Collaboration with outpatient psychiatrist Dr. Chester Schmid, [phone].    7. Pending collateral from , Kevin, [phone].    8. Dispo collaboration with social work.  Remission of symptoms: mood stability, improved anxiety, ability to complete ADLs.   69-year-old female, , no children, retired 3 years ago (was ), domiciled with  in a private residence with . PPHx of Depression and LEO, engaged in outpatient tx with Dr Schmid x20+ years and therapist Cole Romero. No previous psychiatric admissions, denies previous suicide attempts, denies NSSIB. Pt denies all substance use. Pt denies legal issues and denies hx of violence/aggression. Denies access to firearms. Pertinent PMH HLD, HTN, IBS, BONIFACIO has CPAP (has not used for months). Referred by [facility] for poor functioning and inability to care for oneself due to worsening anxiety and depression.   Patient reports symptoms of depression including dysphoric mood, diminished interest in activities, and fatigue/little energy that prevents her from completing ADLs. She reports recent history of 20 lb weight loss "months ago" and states that she gained back 10 lbs. Additionally reports severe anxiety that prevents her from leaving the house, states that she last left her home ~1 month ago. Denies AVH. Denies SI/I/P. Displays insight into need for pharmacologic treatment for depression/anxiety.    On assessment, patient continues to endorse feeling depressed and anxious, denies any SI.    Plan:  1. Admit to [facility], 9.13 legal status.    2. Routine observation with q 15 minute checks. Patient denies suicidality, remains in behavioral control.    3. Psych: continue home medication regimen pending collateral.  Klonopin 1 mg TID  Rexulti 1.5 mg daily  Buspirone 20 mg daily  Buspirone 10 mg afternoon  Hydroxyzine 10 mg TID  Trazodone 100 mg QHS  Trintellix 10 mg po QHS  --PRN Ativan 2 mg IM for severe agitation    4. Medical: continue home medication regimen. Labs reviewed.  Amlodipine 5 mg daily   Aspirin 81 mg daily  Losartan 100 mg daily  Hydrochlorothiazide 25 mg po daily  Pravastatin 20 mg po daily  B complex/vitamin C/multivitamin    5. Encourage individual, group, and milieu therapy.    6. Collaboration with outpatient psychiatrist Dr. Chester Schmid, [phone].    7. Pending collateral from , Kevin, [phone].    8. Dispo collaboration with social work.  Remission of symptoms: mood stability, improved anxiety, ability to complete ADLs.

## 2024-03-23 NOTE — BH INPATIENT PSYCHIATRY PROGRESS NOTE - CURRENT MEDICATION
MEDICATIONS  (STANDING):  brexpiprazole 1.5 milliGRAM(s) Oral daily  busPIRone 20 milliGRAM(s) Oral <User Schedule>  busPIRone 10 milliGRAM(s) Oral <User Schedule>  clonazePAM  Tablet 1 milliGRAM(s) Oral three times a day  hydrOXYzine hydrochloride 25 milliGRAM(s) Oral three times a day  traZODone 100 milliGRAM(s) Oral at bedtime    MEDICATIONS  (PRN):  acetaminophen     Tablet .. 650 milliGRAM(s) Oral every 6 hours PRN Mild Pain (1 - 3), Moderate Pain (4 - 6), Severe Pain (7 - 10)  aluminum hydroxide/magnesium hydroxide/simethicone Suspension 30 milliLiter(s) Oral every 6 hours PRN Dyspepsia  LORazepam     Tablet 1 milliGRAM(s) Oral every 6 hours PRN Severe Anxiety/Agitation  LORazepam     Tablet 0.5 milliGRAM(s) Oral every 6 hours PRN Anxiety  LORazepam   Injectable 2 milliGRAM(s) IntraMuscular Once PRN severe agitation  magnesium hydroxide Suspension 30 milliLiter(s) Oral daily PRN Constipation

## 2024-03-23 NOTE — BH INPATIENT PSYCHIATRY PROGRESS NOTE - PRN MEDS
MEDICATIONS  (PRN):  acetaminophen     Tablet .. 650 milliGRAM(s) Oral every 6 hours PRN Mild Pain (1 - 3), Moderate Pain (4 - 6), Severe Pain (7 - 10)  aluminum hydroxide/magnesium hydroxide/simethicone Suspension 30 milliLiter(s) Oral every 6 hours PRN Dyspepsia  LORazepam     Tablet 1 milliGRAM(s) Oral every 6 hours PRN Severe Anxiety/Agitation  LORazepam     Tablet 0.5 milliGRAM(s) Oral every 6 hours PRN Anxiety  LORazepam   Injectable 2 milliGRAM(s) IntraMuscular Once PRN severe agitation  magnesium hydroxide Suspension 30 milliLiter(s) Oral daily PRN Constipation

## 2024-03-23 NOTE — BH INPATIENT PSYCHIATRY PROGRESS NOTE - NSBHFUPINTERVALHXFT_PSY_A_CORE
Chart reviewed and case discussed with treatment team. No events reported overnight. Sleep was poor due to the environment and appetite is fair. Patient reports feeling "out of sorts", anxious. Denies any SI. Patient is compliant with medications, no adverse effects reported.

## 2024-03-24 PROCEDURE — 99231 SBSQ HOSP IP/OBS SF/LOW 25: CPT

## 2024-03-24 RX ORDER — LIDOCAINE 4 G/100G
1 CREAM TOPICAL ONCE
Refills: 0 | Status: COMPLETED | OUTPATIENT
Start: 2024-03-24 | End: 2024-03-24

## 2024-03-24 RX ADMIN — BREXPIPRAZOLE 1.5 MILLIGRAM(S): 0.25 TABLET ORAL at 09:47

## 2024-03-24 RX ADMIN — LIDOCAINE 1 PATCH: 4 CREAM TOPICAL at 23:30

## 2024-03-24 RX ADMIN — Medication 1 MILLIGRAM(S): at 20:48

## 2024-03-24 RX ADMIN — Medication 1 MILLIGRAM(S): at 08:11

## 2024-03-24 RX ADMIN — Medication 25 MILLIGRAM(S): at 08:11

## 2024-03-24 RX ADMIN — Medication 25 MILLIGRAM(S): at 20:48

## 2024-03-24 RX ADMIN — Medication 20 MILLIGRAM(S): at 08:11

## 2024-03-24 RX ADMIN — Medication 100 MILLIGRAM(S): at 20:49

## 2024-03-24 RX ADMIN — Medication 10 MILLIGRAM(S): at 12:08

## 2024-03-24 RX ADMIN — Medication 25 MILLIGRAM(S): at 12:08

## 2024-03-24 RX ADMIN — Medication 1 MILLIGRAM(S): at 12:07

## 2024-03-24 NOTE — BH INPATIENT PSYCHIATRY PROGRESS NOTE - NSBHASSESSSUMMFT_PSY_ALL_CORE
69-year-old female, , no children, retired 3 years ago (was ), domiciled with  in a private residence with . PPHx of Depression and LEO, engaged in outpatient tx with Dr Schmid x20+ years and therapist Cole Romero. No previous psychiatric admissions, denies previous suicide attempts, denies NSSIB. Pt denies all substance use. Pt denies legal issues and denies hx of violence/aggression. Denies access to firearms. Pertinent PMH HLD, HTN, IBS, BONIFACIO has CPAP (has not used for months). Referred by [facility] for poor functioning and inability to care for oneself due to worsening anxiety and depression.   Patient reports symptoms of depression including dysphoric mood, diminished interest in activities, and fatigue/little energy that prevents her from completing ADLs. She reports recent history of 20 lb weight loss "months ago" and states that she gained back 10 lbs. Additionally reports severe anxiety that prevents her from leaving the house, states that she last left her home ~1 month ago. Denies AVH. Denies SI/I/P. Displays insight into need for pharmacologic treatment for depression/anxiety.    On assessment, patient continues to endorse feeling depressed and anxious, denies any SI.    Plan:  1. Admit to [facility], 9.13 legal status.    2. Routine observation with q 15 minute checks. Patient denies suicidality, remains in behavioral control.    3. Psych: continue home medication regimen pending collateral.  Klonopin 1 mg TID  Rexulti 1.5 mg daily  Buspirone 20 mg daily  Buspirone 10 mg afternoon  Hydroxyzine 10 mg TID  Trazodone 100 mg QHS  Trintellix 10 mg po QHS  --PRN Ativan 2 mg IM for severe agitation    4. Medical: continue home medication regimen. Labs reviewed.  Amlodipine 5 mg daily   Aspirin 81 mg daily  Losartan 100 mg daily  Hydrochlorothiazide 25 mg po daily  Pravastatin 20 mg po daily  B complex/vitamin C/multivitamin    5. Encourage individual, group, and milieu therapy.    6. Collaboration with outpatient psychiatrist Dr. Chester Schmid, [phone].    7. Pending collateral from , Kevin, [phone].    8. Dispo collaboration with social work.  Remission of symptoms: mood stability, improved anxiety, ability to complete ADLs.

## 2024-03-24 NOTE — BH INPATIENT PSYCHIATRY PROGRESS NOTE - NSBHCHARTREVIEWVS_PSY_A_CORE FT
Vital Signs Last 24 Hrs  T(C): 36.5 (03-24-24 @ 07:18), Max: 36.5 (03-24-24 @ 07:18)  T(F): 97.7 (03-24-24 @ 07:18), Max: 97.7 (03-24-24 @ 07:18)  HR: --  BP: --  BP(mean): --  RR: 18 (03-24-24 @ 07:18) (18 - 18)  SpO2: 99% (03-24-24 @ 03:10) (99% - 100%)    Orthostatic VS  03-24-24 @ 07:18  Lying BP: --/-- HR: --  Sitting BP: 133/77 HR: 79  Standing BP: 130/68 HR: 82  Site: --  Mode: --  Orthostatic VS  03-23-24 @ 08:31  Lying BP: --/-- HR: --  Sitting BP: 135/79 HR: 79  Standing BP: 116/71 HR: 98  Site: --  Mode: --  Orthostatic VS  03-22-24 @ 12:45  Lying BP: --/-- HR: --  Sitting BP: 147/92 HR: 106  Standing BP: 138/87 HR: 112  Site: upper left arm  Mode: electronic

## 2024-03-24 NOTE — BH INPATIENT PSYCHIATRY PROGRESS NOTE - NSBHFUPINTERVALHXFT_PSY_A_CORE
Chart reviewed and case discussed with treatment team. No events reported overnight. Sleep and appetite is "not bad". Patient endorses feeling down and anxious, she is isolative to her room. She denies any SI. Patient is compliant with medications, no adverse effects reported.

## 2024-03-24 NOTE — BH INPATIENT PSYCHIATRY PROGRESS NOTE - NSBHMETABOLIC_PSY_ALL_CORE_FT
BMI: BMI (kg/m2): 33.6 (03-22-24 @ 12:45)  HbA1c:   Glucose:   BP: 147/88 (03-22-24 @ 12:14) (121/76 - 147/88)Vital Signs Last 24 Hrs  T(C): 36.5 (03-24-24 @ 07:18), Max: 36.5 (03-24-24 @ 07:18)  T(F): 97.7 (03-24-24 @ 07:18), Max: 97.7 (03-24-24 @ 07:18)  HR: --  BP: --  BP(mean): --  RR: 18 (03-24-24 @ 07:18) (18 - 18)  SpO2: 99% (03-24-24 @ 03:10) (99% - 100%)    Orthostatic VS  03-24-24 @ 07:18  Lying BP: --/-- HR: --  Sitting BP: 133/77 HR: 79  Standing BP: 130/68 HR: 82  Site: --  Mode: --  Orthostatic VS  03-23-24 @ 08:31  Lying BP: --/-- HR: --  Sitting BP: 135/79 HR: 79  Standing BP: 116/71 HR: 98  Site: --  Mode: --  Orthostatic VS  03-22-24 @ 12:45  Lying BP: --/-- HR: --  Sitting BP: 147/92 HR: 106  Standing BP: 138/87 HR: 112  Site: upper left arm  Mode: electronic    Lipid Panel:

## 2024-03-25 PROCEDURE — 99232 SBSQ HOSP IP/OBS MODERATE 35: CPT | Mod: FS

## 2024-03-25 RX ORDER — AMLODIPINE BESYLATE 2.5 MG/1
5 TABLET ORAL DAILY
Refills: 0 | Status: DISCONTINUED | OUTPATIENT
Start: 2024-03-25 | End: 2024-04-03

## 2024-03-25 RX ORDER — LOSARTAN POTASSIUM 100 MG/1
100 TABLET, FILM COATED ORAL DAILY
Refills: 0 | Status: DISCONTINUED | OUTPATIENT
Start: 2024-03-25 | End: 2024-04-03

## 2024-03-25 RX ORDER — ASPIRIN/CALCIUM CARB/MAGNESIUM 324 MG
81 TABLET ORAL DAILY
Refills: 0 | Status: DISCONTINUED | OUTPATIENT
Start: 2024-03-25 | End: 2024-04-26

## 2024-03-25 RX ORDER — ATORVASTATIN CALCIUM 80 MG/1
20 TABLET, FILM COATED ORAL AT BEDTIME
Refills: 0 | Status: DISCONTINUED | OUTPATIENT
Start: 2024-03-25 | End: 2024-04-26

## 2024-03-25 RX ORDER — FLUOXETINE HCL 10 MG
10 CAPSULE ORAL DAILY
Refills: 0 | Status: DISCONTINUED | OUTPATIENT
Start: 2024-03-25 | End: 2024-03-26

## 2024-03-25 RX ADMIN — Medication 1 MILLIGRAM(S): at 12:56

## 2024-03-25 RX ADMIN — Medication 25 MILLIGRAM(S): at 20:09

## 2024-03-25 RX ADMIN — Medication 10 MILLIGRAM(S): at 12:56

## 2024-03-25 RX ADMIN — Medication 25 MILLIGRAM(S): at 08:22

## 2024-03-25 RX ADMIN — Medication 1 MILLIGRAM(S): at 20:08

## 2024-03-25 RX ADMIN — Medication 1 MILLIGRAM(S): at 08:22

## 2024-03-25 RX ADMIN — ATORVASTATIN CALCIUM 20 MILLIGRAM(S): 80 TABLET, FILM COATED ORAL at 20:09

## 2024-03-25 RX ADMIN — BREXPIPRAZOLE 1.5 MILLIGRAM(S): 0.25 TABLET ORAL at 08:22

## 2024-03-25 RX ADMIN — Medication 1 MILLIGRAM(S): at 16:18

## 2024-03-25 RX ADMIN — Medication 100 MILLIGRAM(S): at 20:09

## 2024-03-25 RX ADMIN — Medication 25 MILLIGRAM(S): at 12:57

## 2024-03-25 RX ADMIN — Medication 20 MILLIGRAM(S): at 08:22

## 2024-03-25 NOTE — BH PSYCHOLOGY - GROUP THERAPY NOTE - NSPSYCHOLGRPDBTGOAL_PSY_A_CORE
reduce mood and affective lability/reduce impulsive self-defeating behavior/reduce interpersonal conflicts/improve ability to indentify feelings/improve ability to communicate feelings/reduce vulnerability to emotional dysregualation

## 2024-03-25 NOTE — BH INPATIENT PSYCHIATRY PROGRESS NOTE - NSBHCHARTREVIEWVS_PSY_A_CORE FT
Vital Signs Last 24 Hrs  T(C): 36.7 (03-25-24 @ 07:48), Max: 36.7 (03-25-24 @ 07:48)  T(F): 98.1 (03-25-24 @ 07:48), Max: 98.1 (03-25-24 @ 07:48)  HR: --  BP: --  BP(mean): --  RR: 19 (03-25-24 @ 07:48) (19 - 19)  SpO2: --    Orthostatic VS  03-25-24 @ 07:48  Lying BP: --/-- HR: --  Sitting BP: 144/82 HR: 80  Standing BP: 130/85 HR: 92  Site: --  Mode: --  Orthostatic VS  03-24-24 @ 07:18  Lying BP: --/-- HR: --  Sitting BP: 133/77 HR: 79  Standing BP: 130/68 HR: 82  Site: --  Mode: --

## 2024-03-25 NOTE — BH PSYCHOLOGY - GROUP THERAPY NOTE - NSBHPSYCHOLRESPCOMMENT_PSY_A_CORE FT
The patient appeared adequately groomed and dressed in a hospital gown. Pt was partially engaged in the group as evidenced by participating in mindfulness exercise. Patient shared it was difficult to concentrate and attempted the exercise. Pt received support from peers. Pt was appropriate with her peers and stayed for the entirety of group. Pt did not contribute to group discussion and appeared to be observing group. Pt was encouraged to practice how to think and act dialectically.

## 2024-03-25 NOTE — BH INPATIENT PSYCHIATRY PROGRESS NOTE - PRN MEDS
MEDICATIONS  (PRN):  acetaminophen     Tablet .. 650 milliGRAM(s) Oral every 6 hours PRN Mild Pain (1 - 3), Moderate Pain (4 - 6), Severe Pain (7 - 10)  aluminum hydroxide/magnesium hydroxide/simethicone Suspension 30 milliLiter(s) Oral every 6 hours PRN Dyspepsia  LORazepam     Tablet 0.5 milliGRAM(s) Oral every 6 hours PRN Anxiety  LORazepam     Tablet 1 milliGRAM(s) Oral every 6 hours PRN Severe Anxiety/Agitation  LORazepam   Injectable 2 milliGRAM(s) IntraMuscular Once PRN severe agitation  magnesium hydroxide Suspension 30 milliLiter(s) Oral daily PRN Constipation   MEDICATIONS  (PRN):  acetaminophen     Tablet .. 650 milliGRAM(s) Oral every 6 hours PRN Mild Pain (1 - 3), Moderate Pain (4 - 6), Severe Pain (7 - 10)  aluminum hydroxide/magnesium hydroxide/simethicone Suspension 30 milliLiter(s) Oral every 6 hours PRN Dyspepsia  LORazepam     Tablet 1 milliGRAM(s) Oral every 6 hours PRN Severe Anxiety/Agitation  LORazepam     Tablet 0.5 milliGRAM(s) Oral every 6 hours PRN Anxiety  LORazepam   Injectable 2 milliGRAM(s) IntraMuscular Once PRN severe agitation  magnesium hydroxide Suspension 30 milliLiter(s) Oral daily PRN Constipation

## 2024-03-25 NOTE — BH PSYCHOLOGY - GROUP THERAPY NOTE - NSPSYCHOLGRPDBTPT_PSY_A_CORE FT
Patient attended a Dialectal Behavior Therapy Group (DBT) group focused on Interpersonal Effectiveness. The group began with a brief check in asking patients to share review of skills and/or present emotions. Pt participated in a mindfulness exercise and were encouraged to share thought/reactions. The group focused on the Interpersonal Effectiveness module on the skill of understanding Dialectics. The group was structured at developing skills in how to think and act dialectically. Group facilitator explained concepts, reinforced participation, and engaged patients in discussion.

## 2024-03-25 NOTE — BH INPATIENT PSYCHIATRY PROGRESS NOTE - NSBHMETABOLIC_PSY_ALL_CORE_FT
BMI: BMI (kg/m2): 33.6 (03-22-24 @ 12:45)  HbA1c:   Glucose:   BP: --Vital Signs Last 24 Hrs  T(C): 36.7 (03-25-24 @ 07:48), Max: 36.7 (03-25-24 @ 07:48)  T(F): 98.1 (03-25-24 @ 07:48), Max: 98.1 (03-25-24 @ 07:48)  HR: --  BP: --  BP(mean): --  RR: 19 (03-25-24 @ 07:48) (19 - 19)  SpO2: --    Orthostatic VS  03-25-24 @ 07:48  Lying BP: --/-- HR: --  Sitting BP: 144/82 HR: 80  Standing BP: 130/85 HR: 92  Site: --  Mode: --  Orthostatic VS  03-24-24 @ 07:18  Lying BP: --/-- HR: --  Sitting BP: 133/77 HR: 79  Standing BP: 130/68 HR: 82  Site: --  Mode: --    Lipid Panel:

## 2024-03-25 NOTE — BH INPATIENT PSYCHIATRY PROGRESS NOTE - CURRENT MEDICATION
MEDICATIONS  (STANDING):  amLODIPine   Tablet 5 milliGRAM(s) Oral daily  aspirin  chewable 81 milliGRAM(s) Oral daily  brexpiprazole 1.5 milliGRAM(s) Oral daily  busPIRone 20 milliGRAM(s) Oral <User Schedule>  busPIRone 10 milliGRAM(s) Oral <User Schedule>  clonazePAM  Tablet 1 milliGRAM(s) Oral three times a day  FLUoxetine 10 milliGRAM(s) Oral daily  hydrochlorothiazide 25 milliGRAM(s) Oral daily  hydrOXYzine hydrochloride 25 milliGRAM(s) Oral three times a day  losartan 100 milliGRAM(s) Oral daily  traZODone 100 milliGRAM(s) Oral at bedtime    MEDICATIONS  (PRN):  acetaminophen     Tablet .. 650 milliGRAM(s) Oral every 6 hours PRN Mild Pain (1 - 3), Moderate Pain (4 - 6), Severe Pain (7 - 10)  aluminum hydroxide/magnesium hydroxide/simethicone Suspension 30 milliLiter(s) Oral every 6 hours PRN Dyspepsia  LORazepam     Tablet 0.5 milliGRAM(s) Oral every 6 hours PRN Anxiety  LORazepam     Tablet 1 milliGRAM(s) Oral every 6 hours PRN Severe Anxiety/Agitation  LORazepam   Injectable 2 milliGRAM(s) IntraMuscular Once PRN severe agitation  magnesium hydroxide Suspension 30 milliLiter(s) Oral daily PRN Constipation   MEDICATIONS  (STANDING):  amLODIPine   Tablet 5 milliGRAM(s) Oral daily  aspirin  chewable 81 milliGRAM(s) Oral daily  brexpiprazole 1.5 milliGRAM(s) Oral daily  busPIRone 20 milliGRAM(s) Oral <User Schedule>  busPIRone 10 milliGRAM(s) Oral <User Schedule>  clonazePAM  Tablet 1 milliGRAM(s) Oral three times a day  FLUoxetine 10 milliGRAM(s) Oral daily  hydrochlorothiazide 25 milliGRAM(s) Oral daily  hydrOXYzine hydrochloride 25 milliGRAM(s) Oral three times a day  losartan 100 milliGRAM(s) Oral daily  traZODone 100 milliGRAM(s) Oral at bedtime    MEDICATIONS  (PRN):  acetaminophen     Tablet .. 650 milliGRAM(s) Oral every 6 hours PRN Mild Pain (1 - 3), Moderate Pain (4 - 6), Severe Pain (7 - 10)  aluminum hydroxide/magnesium hydroxide/simethicone Suspension 30 milliLiter(s) Oral every 6 hours PRN Dyspepsia  LORazepam     Tablet 1 milliGRAM(s) Oral every 6 hours PRN Severe Anxiety/Agitation  LORazepam     Tablet 0.5 milliGRAM(s) Oral every 6 hours PRN Anxiety  LORazepam   Injectable 2 milliGRAM(s) IntraMuscular Once PRN severe agitation  magnesium hydroxide Suspension 30 milliLiter(s) Oral daily PRN Constipation   MEDICATIONS  (STANDING):  amLODIPine   Tablet 5 milliGRAM(s) Oral daily  aspirin  chewable 81 milliGRAM(s) Oral daily  atorvastatin 20 milliGRAM(s) Oral at bedtime  brexpiprazole 1.5 milliGRAM(s) Oral daily  busPIRone 20 milliGRAM(s) Oral <User Schedule>  busPIRone 10 milliGRAM(s) Oral <User Schedule>  clonazePAM  Tablet 1 milliGRAM(s) Oral three times a day  FLUoxetine 10 milliGRAM(s) Oral daily  hydrochlorothiazide 25 milliGRAM(s) Oral daily  hydrOXYzine hydrochloride 25 milliGRAM(s) Oral three times a day  losartan 100 milliGRAM(s) Oral daily  traZODone 100 milliGRAM(s) Oral at bedtime    MEDICATIONS  (PRN):  acetaminophen     Tablet .. 650 milliGRAM(s) Oral every 6 hours PRN Mild Pain (1 - 3), Moderate Pain (4 - 6), Severe Pain (7 - 10)  aluminum hydroxide/magnesium hydroxide/simethicone Suspension 30 milliLiter(s) Oral every 6 hours PRN Dyspepsia  LORazepam     Tablet 1 milliGRAM(s) Oral every 6 hours PRN Severe Anxiety/Agitation  LORazepam     Tablet 0.5 milliGRAM(s) Oral every 6 hours PRN Anxiety  LORazepam   Injectable 2 milliGRAM(s) IntraMuscular Once PRN severe agitation  magnesium hydroxide Suspension 30 milliLiter(s) Oral daily PRN Constipation

## 2024-03-25 NOTE — BH INPATIENT PSYCHIATRY PROGRESS NOTE - NSBHASSESSSUMMFT_PSY_ALL_CORE
69-year-old female, , no children, retired 3 years ago (was ), domiciled with  in a private residence with . PPHx of Depression and LEO, engaged in outpatient tx with Dr Schmid x20+ years and therapist Cole Romero. No previous psychiatric admissions, denies previous suicide attempts, denies NSSIB. Pt denies all substance use. Pt denies legal issues and denies hx of violence/aggression. Denies access to firearms. Pertinent PMH HLD, HTN, IBS, BONIFACIO has CPAP (has not used for months). Referred by [facility] for poor functioning and inability to care for oneself due to worsening anxiety and depression.   Patient reports symptoms of depression including dysphoric mood, diminished interest in activities, and fatigue/little energy that prevents her from completing ADLs. She reports recent history of 20 lb weight loss "months ago" and states that she gained back 10 lbs. Additionally reports severe anxiety that prevents her from leaving the house, states that she last left her home ~1 month ago. Denies AVH. Denies SI/I/P. Displays insight into need for pharmacologic treatment for depression/anxiety.    On assessment, patient continues to endorse feeling depressed and anxious. Denies SI/I/P. Agreeable to starting Prozac and ECT. Plan to obtain ECT and dental consults.    Plan:  1. Admit to [address] [facility], 9.13 legal status.    2. Routine observation with q 15 minute checks. Patient denies suicidality, remains in behavioral control.    3. Psych: continue home medication regimen pending collateral.  START Prozac 10 mg daily  Klonopin 1 mg TID  Rexulti 1.5 mg daily  Buspirone 20 mg daily  Buspirone 10 mg afternoon  Hydroxyzine 10 mg TID  Trazodone 100 mg QHS  Trintellix 10 mg po QHS  --PRN Ativan 2 mg IM for severe agitation    4. Medical: continue home medication regimen. Labs reviewed.  Amlodipine 5 mg daily   Aspirin 81 mg daily  Losartan 100 mg daily  Hydrochlorothiazide 25 mg po daily  Pravastatin 20 mg po daily  B complex/vitamin C/multivitamin    5. Encourage individual, group, and milieu therapy.    6. Collaboration with outpatient psychiatrist Dr. Chester Schmid, [phone].    7. Pending collateral from , Kevin, [phone].    8. Dispo collaboration with social work.  Remission of symptoms: mood stability, improved anxiety, ability to complete ADLs.   69-year-old female, , no children, retired 3 years ago (was ), domiciled with  in a private residence with . PPHx of Depression and LEO, engaged in outpatient tx with Dr Schmid x20+ years and therapist Cole Romero. No previous psychiatric admissions, denies previous suicide attempts, denies NSSIB. Pt denies all substance use. Pt denies legal issues and denies hx of violence/aggression. Denies access to firearms. Pertinent PMH HLD, HTN, IBS, BONIFACIO has CPAP (has not used for months). Referred by [facility] for poor functioning and inability to care for oneself due to worsening anxiety and depression.   Patient reports symptoms of depression including dysphoric mood, diminished interest in activities, and fatigue/little energy that prevents her from completing ADLs. She reports recent history of 20 lb weight loss "months ago" and states that she gained back 10 lbs. Additionally reports severe anxiety that prevents her from leaving the house, states that she last left her home ~1 month ago. Denies AVH. Denies SI/I/P. Displays insight into need for pharmacologic treatment for depression/anxiety.    On assessment, patient continues to endorse feeling depressed and anxious. Denies SI/I/P. Agreeable to starting Prozac and ECT. Plan to obtain ECT and dental consults.    Plan:  1. Admit to [facility], 9.13 legal status.    2. Routine observation with q 15 minute checks. Patient denies suicidality, remains in behavioral control.    3. Psych: continue home medication regimen pending collateral.  START Prozac 10 mg daily  Klonopin 1 mg TID  Rexulti 1.5 mg daily  Buspirone 20 mg daily  Buspirone 10 mg afternoon  Hydroxyzine 25 mg TID  Trazodone 100 mg QHS  --PRN Ativan 2 mg IM for severe agitation    4. Medical: continue home medication regimen. Labs reviewed.  Amlodipine 5 mg daily   Aspirin 81 mg daily  Losartan 100 mg daily  Hydrochlorothiazide 25 mg po daily  Pravastatin 20 mg po daily  B complex/vitamin C/multivitamin    5. Encourage individual, group, and milieu therapy.    6. Collaboration with outpatient psychiatrist Dr. Chester Schmid, [phone].    7. Pending collateral from , Kevin, [phone].    8. Dispo collaboration with social work.  Remission of symptoms: mood stability, improved anxiety, ability to complete ADLs.

## 2024-03-25 NOTE — BH INPATIENT PSYCHIATRY PROGRESS NOTE - NSBHFUPINTERVALHXFT_PSY_A_CORE
Patient seen for follow up for depression and anxiety. Chart reviewed and case discussed with treatment team. Upon assessment, patient reports worsening anxiety, states that her symptoms include having "a pit in my stomach," inability to complete tasks, and restlessness. Feels as though the medications are not helping her, except the Klonopin which helps but makes her feel tired. States her mood is depressed and "sad that I have to go through this." Discussed collateral from Dr. Schmid and treatment options going forward. Patient is agreeable to starting an antidepressant, Prozac, and ECT. Discussed potential side effects of ECT including but not limited to short-term memory loss and headaches. Patient denies any loose/broken teeth, states that she has crowns. Plan to obtain ECT/dental consults. Patient reports sleep and appetite as "half and half". States she is not eating much as to not gain weight. Denies AVH. Denies hopelessness. Denies SI/I/P. Patient seen for follow up for depression and anxiety. Chart reviewed and case discussed with treatment team. Patient was seen sitting calmly in the dayroom. Upon assessment, patient reports worsening anxiety, states that her symptoms include having "a pit in my stomach," inability to complete tasks, and restlessness. Feels as though the medications are not helping her, except the Klonopin which helps but makes her feel tired. States her mood is depressed and "sad that I have to go through this." Discussed collateral from Dr. Schmid and treatment options going forward. Patient is agreeable to starting an antidepressant, Prozac, and ECT. Discussed potential side effects of ECT including but not limited to short-term memory loss and headaches. Patient denies any loose/broken teeth, states that she has crowns. Plan to obtain ECT/dental consults. Patient reports sleep and appetite as "half and half". States she is not eating much as to not gain weight. Denies AVH. Denies hopelessness. Denies SI/I/P.

## 2024-03-25 NOTE — BH PSYCHOLOGY - GROUP THERAPY NOTE - TOKEN PULL-DIAGNOSIS
Primary Diagnosis:  Severe episode of recurrent major depressive disorder, without psychotic features [F33.2]        Problem Dx:   Anxiety [F41.9]      Severe episode of recurrent major depressive disorder, without psychotic features [F33.2]

## 2024-03-26 PROBLEM — G47.33 OBSTRUCTIVE SLEEP APNEA (ADULT) (PEDIATRIC): Chronic | Status: ACTIVE | Noted: 2024-03-22

## 2024-03-26 PROBLEM — I10 ESSENTIAL (PRIMARY) HYPERTENSION: Chronic | Status: ACTIVE | Noted: 2024-03-22

## 2024-03-26 PROBLEM — F32.9 MAJOR DEPRESSIVE DISORDER, SINGLE EPISODE, UNSPECIFIED: Chronic | Status: ACTIVE | Noted: 2024-03-21

## 2024-03-26 PROBLEM — E78.5 HYPERLIPIDEMIA, UNSPECIFIED: Chronic | Status: ACTIVE | Noted: 2024-03-22

## 2024-03-26 PROBLEM — K58.9 IRRITABLE BOWEL SYNDROME WITHOUT DIARRHEA: Chronic | Status: ACTIVE | Noted: 2024-03-22

## 2024-03-26 PROBLEM — Z86.69 PERSONAL HISTORY OF OTHER DISEASES OF THE NERVOUS SYSTEM AND SENSE ORGANS: Chronic | Status: ACTIVE | Noted: 2024-03-22

## 2024-03-26 LAB

## 2024-03-26 PROCEDURE — 99232 SBSQ HOSP IP/OBS MODERATE 35: CPT | Mod: FS

## 2024-03-26 RX ORDER — FLUOXETINE HCL 10 MG
30 CAPSULE ORAL DAILY
Refills: 0 | Status: DISCONTINUED | OUTPATIENT
Start: 2024-03-26 | End: 2024-04-04

## 2024-03-26 RX ADMIN — Medication 20 MILLIGRAM(S): at 09:00

## 2024-03-26 RX ADMIN — BREXPIPRAZOLE 1.5 MILLIGRAM(S): 0.25 TABLET ORAL at 10:22

## 2024-03-26 RX ADMIN — Medication 1 MILLIGRAM(S): at 13:45

## 2024-03-26 RX ADMIN — Medication 25 MILLIGRAM(S): at 13:07

## 2024-03-26 RX ADMIN — Medication 1 MILLIGRAM(S): at 10:23

## 2024-03-26 RX ADMIN — Medication 25 MILLIGRAM(S): at 13:45

## 2024-03-26 RX ADMIN — Medication 100 MILLIGRAM(S): at 21:42

## 2024-03-26 RX ADMIN — ATORVASTATIN CALCIUM 20 MILLIGRAM(S): 80 TABLET, FILM COATED ORAL at 21:42

## 2024-03-26 RX ADMIN — Medication 1 MILLIGRAM(S): at 21:42

## 2024-03-26 RX ADMIN — Medication 10 MILLIGRAM(S): at 13:45

## 2024-03-26 RX ADMIN — Medication 81 MILLIGRAM(S): at 10:22

## 2024-03-26 RX ADMIN — AMLODIPINE BESYLATE 5 MILLIGRAM(S): 2.5 TABLET ORAL at 10:21

## 2024-03-26 RX ADMIN — Medication 25 MILLIGRAM(S): at 21:42

## 2024-03-26 RX ADMIN — Medication 1 MILLIGRAM(S): at 18:37

## 2024-03-26 RX ADMIN — LOSARTAN POTASSIUM 100 MILLIGRAM(S): 100 TABLET, FILM COATED ORAL at 10:22

## 2024-03-26 RX ADMIN — Medication 10 MILLIGRAM(S): at 10:21

## 2024-03-26 NOTE — BH INPATIENT PSYCHIATRY PROGRESS NOTE - NSBHFUPINTERVALHXFT_PSY_A_CORE
Patient seen for follow up for depression and anxiety. Chart reviewed and case discussed with treatment team. Patient was seen sitting calmly in the group room. On assessment, she reports that she did not go to group session today because she did not feel like it. She does not yet feel acclimated to the schedule here. She feels that her anxiety is present but "not that bad" today. She endorses poor sleep due to the uncomfortable beds. Her appetite is "okay". She had oatmeal for breakfast and says she probably will not finish her lunch today. She denies side effects from Prozac that was started yesterday, specifically denies nausea, diarrhea, constipation. Her  has reviewed the ECT paperwork but she has not taken a look yet, feels that she is unable to concentrate on it. Denies SI, visual and auditory hallucinations.  Patient seen for follow up for depression and anxiety. Chart reviewed and case discussed with treatment team. Patient was seen sitting calmly in the dayroom. On assessment, she reports that she did not go to group session today because she did not feel like it. Patient reported feeling "confused", attempted to clarify since she does not appear or present at confused, corrected herself saying she does not yet feel acclimated to the schedule here. She feels that her anxiety is present but "not that bad" today. She endorses poor sleep due to the uncomfortable beds. Her appetite is "okay". She had oatmeal for breakfast and says she probably will not finish her lunch today. She denies side effects from Prozac that was started yesterday, specifically denies nausea, diarrhea, constipation. discussed increasing Prozac to 20mg, she agreed. Her  has reviewed the ECT paperwork but she has not taken a look yet, feels that she is unable to concentrate on it, encouraged her to review paperwork before ECT MD comes to meet with her. Also encouraged to take part of her treatment and no relying on her . Denies SIIP, visual and auditory hallucinations. ECT MD met with patient, consent signed, she does require dental consult, scheduled for Friday at 3pm.  Patient seen for follow up for depression and anxiety. Chart reviewed and case discussed with treatment team. Patient was seen sitting calmly in the dayroom. On assessment, she reports that she did not go to group session today because she did not feel like it. Patient reported feeling "confused", attempted to clarify since she does not appear or present at confused, corrected herself saying she does not yet feel acclimated to the schedule here. She feels that her anxiety is present but "not that bad" today. She endorses poor sleep due to the uncomfortable beds. Her appetite is "okay". She had oatmeal for breakfast and says she probably will not finish her lunch today. She denies side effects from Prozac that was started yesterday, specifically denies nausea, diarrhea, constipation. discussed increasing Prozac to 20mg, she agreed. Her  has reviewed the ECT paperwork but she has not taken a look yet, feels that she is unable to concentrate on it, encouraged her to review paperwork before ECT MD comes to meet with her. Also encouraged to take part of her treatment and no relying on her . Denies SIIP, visual and auditory hallucinations. ECT MD met with patient, consent signed, she does require dental consult, scheduled for Friday at 3pm. MOCA performed today, score was 15/30, results may be confounded by anxiety and depression. Will repeat after starting ECT.

## 2024-03-26 NOTE — ECT CONSULT NOTE - CURRENT MEDICATION
MEDICATIONS  (STANDING):  amLODIPine   Tablet 5 milliGRAM(s) Oral daily  aspirin  chewable 81 milliGRAM(s) Oral daily  atorvastatin 20 milliGRAM(s) Oral at bedtime  brexpiprazole 1.5 milliGRAM(s) Oral daily  busPIRone 20 milliGRAM(s) Oral <User Schedule>  busPIRone 10 milliGRAM(s) Oral <User Schedule>  clonazePAM  Tablet 1 milliGRAM(s) Oral three times a day  FLUoxetine 10 milliGRAM(s) Oral daily  hydrochlorothiazide 25 milliGRAM(s) Oral daily  hydrOXYzine hydrochloride 25 milliGRAM(s) Oral three times a day  losartan 100 milliGRAM(s) Oral daily  traZODone 100 milliGRAM(s) Oral at bedtime    MEDICATIONS  (PRN):  acetaminophen     Tablet .. 650 milliGRAM(s) Oral every 6 hours PRN Mild Pain (1 - 3), Moderate Pain (4 - 6), Severe Pain (7 - 10)  aluminum hydroxide/magnesium hydroxide/simethicone Suspension 30 milliLiter(s) Oral every 6 hours PRN Dyspepsia  LORazepam     Tablet 1 milliGRAM(s) Oral every 6 hours PRN Severe Anxiety/Agitation  LORazepam     Tablet 0.5 milliGRAM(s) Oral every 6 hours PRN Anxiety  LORazepam   Injectable 2 milliGRAM(s) IntraMuscular Once PRN severe agitation  magnesium hydroxide Suspension 30 milliLiter(s) Oral daily PRN Constipation

## 2024-03-26 NOTE — ECT CONSULT NOTE - NSECTASSESSRECOMM_PSY_ALL_CORE
Patient presents with a severe episode of major depressive disorder associated with anxiety symptoms which has had poor response to oral medications. No prior substance use. She is a good candidate for ECT. Discussed the benefits and risks, including memory impairment, and she was able to participate in the consent process. She has capacity to consent. She consented to bifrontal ECT.

## 2024-03-26 NOTE — ECT CONSULT NOTE - DETAILS
mother with hx of anxiety and depression, niece with hx of psychiatric admission for anxiety and depression and another niece with anxiety and depression.

## 2024-03-26 NOTE — BH INPATIENT PSYCHIATRY PROGRESS NOTE - NSBHASSESSSUMMFT_PSY_ALL_CORE
69-year-old female, , no children, retired 3 years ago (was ), domiciled with  in a private residence with . PPHx of Depression and LEO, engaged in outpatient tx with Dr Schmid x20+ years and therapist Cole Romero. No previous psychiatric admissions, denies previous suicide attempts, denies NSSIB. Pt denies all substance use. Pt denies legal issues and denies hx of violence/aggression. Denies access to firearms. Pertinent PMH HLD, HTN, IBS, BONIFACIO has CPAP (has not used for months). Referred by [facility] for poor functioning and inability to care for oneself due to worsening anxiety and depression.   Patient reports symptoms of depression including dysphoric mood, diminished interest in activities, and fatigue/little energy that prevents her from completing ADLs. She reports recent history of 20 lb weight loss "months ago" and states that she gained back 10 lbs. Additionally reports severe anxiety that prevents her from leaving the house, states that she last left her home ~1 month ago. Denies AVH. Denies SI/I/P. Displays insight into need for pharmacologic treatment for depression/anxiety.    On assessment, patient continues to endorse feeling "depressed" and "mellow", with improvement in anxiety from yesterday. Denies SI/I/P. Agreeable to starting Prozac and ECT. Denies medication side effects after beginning Prozac. Plan to obtain ECT and dental consults. Dental consult scheduled for 3pm on Friday 3/29.    Plan:  1. Admit to [address] [place] [address] legal status.    2. Routine observation with q 15 minute checks. Patient denies suicidality, remains in behavioral control.    3. Psych: continue home medication regimen pending collateral.  Prozac 10 mg daily  Klonopin 1 mg TID  Rexulti 1.5 mg daily  Buspirone 20 mg daily  Buspirone 10 mg afternoon  Hydroxyzine 25 mg TID  Trazodone 100 mg QHS  --PRN Ativan 2 mg IM for severe agitation    4. Medical: continue home medication regimen. Labs reviewed.  Amlodipine 5 mg daily   Aspirin 81 mg daily  Losartan 100 mg daily  Hydrochlorothiazide 25 mg po daily  Pravastatin 20 mg po daily  B complex/vitamin C/multivitamin    5. ECT  Dental consult scheduled for 3pm 3/29  ECT consult pending  Patient's  has reviewed ECT documents, patient instructed to do so as well     6. Encourage individual, group, and milieu therapy.    7. Collaboration with outpatient psychiatrist Dr. Chester Schmid, [phone].    8. Pending collateral from , Kevin, [phone].    9. Dispo collaboration with social work.  Remission of symptoms: mood stability, improved anxiety, ability to complete ADLs. 69-year-old female, , no children, retired 3 years ago (was ), domiciled with  in a private residence with . PPHx of Depression and LEO, engaged in outpatient tx with Dr Schmid x20+ years and therapist Cole Romero. No previous psychiatric admissions, denies previous suicide attempts, denies NSSIB. Pt denies all substance use. Pt denies legal issues and denies hx of violence/aggression. Denies access to firearms. Pertinent PMH HLD, HTN, IBS, BONIFACIO has CPAP (has not used for months). Referred by [facility] for poor functioning and inability to care for oneself due to worsening anxiety and depression.   Patient reports symptoms of depression including dysphoric mood, diminished interest in activities, and fatigue/little energy that prevents her from completing ADLs. She reports recent history of 20 lb weight loss "months ago" and states that she gained back 10 lbs. Additionally reports severe anxiety that prevents her from leaving the house, states that she last left her home ~1 month ago. Denies AVH. Denies SI/I/P. Displays insight into need for pharmacologic treatment for depression/anxiety.    On assessment, patient continues to endorse feeling "depressed" and "mellow", with improvement in anxiety from yesterday. Denies SI/I/P. Agreeable to starting Prozac and ECT. Denies medication side effects after beginning Prozac. Plan to obtain ECT and dental consults. Dental consult scheduled for 3pm on Friday 3/29.    Plan:  1. Admit to [address] [place] [address] legal status.    2. Routine observation with q 15 minute checks. Patient denies suicidality, remains in behavioral control.    3. Psych: continue home medication regimen pending collateral.  Prozac 20 mg daily  Klonopin 1 mg TID  Rexulti 1.5 mg daily  Buspirone 20 mg daily  Buspirone 10 mg afternoon  Hydroxyzine 25 mg TID  Trazodone 125 mg QHS  --PRN Ativan 2 mg IM for severe agitation    4. Medical: continue home medication regimen. Labs reviewed.  Amlodipine 5 mg daily   Aspirin 81 mg daily  Losartan 100 mg daily  Hydrochlorothiazide 25 mg po daily  Pravastatin 20 mg po daily  B complex/vitamin C/multivitamin    5. ECT  Dental consult scheduled for 3pm 3/29  ECT consult pending  Patient's  has reviewed ECT documents, patient instructed to do so as well     6. Encourage individual, group, and milieu therapy.    7. Collaboration with outpatient psychiatrist Dr. Chester Schmid, [phone].    8. Pending collateral from , Kevin, [phone].    9. Dispo collaboration with social work.  Remission of symptoms: mood stability, improved anxiety, ability to complete ADLs.

## 2024-03-26 NOTE — ECT CONSULT NOTE - NSECTMENTALSTATUSEXAM_PSY_ALL_CORE
On MSE she was alert, cooperative with mild psychomotor retardation. Mildly unkempt, fair eye contact, speech as WNL. Mood was "anxious and depressed" and affect was constricted but appropriate to content and situation. No clear delusions elicited. Denies SHIIP. Denies AV hallucinations. Cognition: grossly intact. I/J: fair.

## 2024-03-26 NOTE — BH INPATIENT PSYCHIATRY PROGRESS NOTE - CURRENT MEDICATION
MEDICATIONS  (STANDING):  amLODIPine   Tablet 5 milliGRAM(s) Oral daily  aspirin  chewable 81 milliGRAM(s) Oral daily  atorvastatin 20 milliGRAM(s) Oral at bedtime  brexpiprazole 1.5 milliGRAM(s) Oral daily  busPIRone 20 milliGRAM(s) Oral <User Schedule>  busPIRone 10 milliGRAM(s) Oral <User Schedule>  clonazePAM  Tablet 1 milliGRAM(s) Oral three times a day  FLUoxetine 10 milliGRAM(s) Oral daily  hydrochlorothiazide 25 milliGRAM(s) Oral daily  hydrOXYzine hydrochloride 25 milliGRAM(s) Oral three times a day  losartan 100 milliGRAM(s) Oral daily  traZODone 100 milliGRAM(s) Oral at bedtime    MEDICATIONS  (PRN):  acetaminophen     Tablet .. 650 milliGRAM(s) Oral every 6 hours PRN Mild Pain (1 - 3), Moderate Pain (4 - 6), Severe Pain (7 - 10)  aluminum hydroxide/magnesium hydroxide/simethicone Suspension 30 milliLiter(s) Oral every 6 hours PRN Dyspepsia  LORazepam     Tablet 1 milliGRAM(s) Oral every 6 hours PRN Severe Anxiety/Agitation  LORazepam     Tablet 0.5 milliGRAM(s) Oral every 6 hours PRN Anxiety  LORazepam   Injectable 2 milliGRAM(s) IntraMuscular Once PRN severe agitation  magnesium hydroxide Suspension 30 milliLiter(s) Oral daily PRN Constipation   MEDICATIONS  (STANDING):  amLODIPine   Tablet 5 milliGRAM(s) Oral daily  aspirin  chewable 81 milliGRAM(s) Oral daily  atorvastatin 20 milliGRAM(s) Oral at bedtime  brexpiprazole 1.5 milliGRAM(s) Oral daily  busPIRone 20 milliGRAM(s) Oral <User Schedule>  busPIRone 10 milliGRAM(s) Oral <User Schedule>  clonazePAM  Tablet 1 milliGRAM(s) Oral three times a day  hydrochlorothiazide 25 milliGRAM(s) Oral daily  hydrOXYzine hydrochloride 25 milliGRAM(s) Oral three times a day  losartan 100 milliGRAM(s) Oral daily  traZODone 100 milliGRAM(s) Oral at bedtime    MEDICATIONS  (PRN):  acetaminophen     Tablet .. 650 milliGRAM(s) Oral every 6 hours PRN Mild Pain (1 - 3), Moderate Pain (4 - 6), Severe Pain (7 - 10)  aluminum hydroxide/magnesium hydroxide/simethicone Suspension 30 milliLiter(s) Oral every 6 hours PRN Dyspepsia  LORazepam     Tablet 1 milliGRAM(s) Oral every 6 hours PRN Severe Anxiety/Agitation  LORazepam     Tablet 0.5 milliGRAM(s) Oral every 6 hours PRN Anxiety  LORazepam   Injectable 2 milliGRAM(s) IntraMuscular Once PRN severe agitation  magnesium hydroxide Suspension 30 milliLiter(s) Oral daily PRN Constipation

## 2024-03-26 NOTE — BH INPATIENT PSYCHIATRY PROGRESS NOTE - NSBHMETABOLIC_PSY_ALL_CORE_FT
BMI: BMI (kg/m2): 33.6 (03-22-24 @ 12:45)  HbA1c:   Glucose:   BP: --Vital Signs Last 24 Hrs  T(C): 36.7 (03-26-24 @ 07:21), Max: 36.7 (03-26-24 @ 07:21)  T(F): 98 (03-26-24 @ 07:21), Max: 98 (03-26-24 @ 07:21)  HR: --  BP: --  BP(mean): --  RR: 17 (03-26-24 @ 07:21) (17 - 17)  SpO2: --    Orthostatic VS  03-26-24 @ 07:21  Lying BP: --/-- HR: --  Sitting BP: 139/77 HR: 81  Standing BP: 134/83 HR: 88  Site: --  Mode: --  Orthostatic VS  03-25-24 @ 07:48  Lying BP: --/-- HR: --  Sitting BP: 144/82 HR: 80  Standing BP: 130/85 HR: 92  Site: --  Mode: --    Lipid Panel:

## 2024-03-26 NOTE — ECT CONSULT NOTE - OTHER PAST PSYCHIATRIC HISTORY (INCLUDE DETAILS REGARDING ONSET, COURSE OF ILLNESS, INPATIENT/OUTPATIENT TREATMENT)
69 year old female, , no children, retired 3 years ago (was ) and domiciled with  in a private residence. PPH: Hx of Depression and LEO, engaged in outpatient tx with Dr Schmid x20+ years. No previous psychiatric admissions, denies previous suicide attempts, denies NSSIB. PMH: HLD, HTN, IBS, BONIFACIO has CPAP, Pt denies substance/ETOH use. Pt denies legal issues and denies hx of violence/aggression. Denies access to firearms, referred by [facility] for poor functioning due to increased anxiety and depression.     At [facility], pt was seeking admission for worsening anxiety and depression over the last couple of months. She reported that current meds are not effective. Pt reported feeling restless.   Pt reported feeling increasingly anxious, perseverative, unable to relax, restless with difficulty showering; missing showers multiple x/week. Pt reported agoraphobia, anhedonia and disrupted sleep and appetite. Pt denied past and present suicidal and homicidal ideation, intent and plan.    On interview in ED, pt confirms the collateral above. She is unable to identify acute stressors. States she is anxious to the point where she is afraid to go outside. She is showering infrequently. She reports anhedonia. She has been taking prn xanax 2-3 times daily and prn klonopin 2-3 times daily.     Symptoms of depression include dysphoric mood, diminished interest in activities stating "I don't want to do anything," and fatigue/little energy that prevents her from completing ADLs stating that she is only able to shower once per week. She reports recent history of 20 lb weight loss "months ago" and states that she gained back 10 lbs. Additionally reports severe anxiety that prevents her from leaving the house, states that she last left her home ~1 month ago. States that she has been trying to address these symptoms with her outpatient psychiatrist but that no medication adjustments were helping so he recommended that she go to the ED.    Currently in outpatient treatment with psychiatrist Chester Schmid ([phone]) and therapist Cole Romero ([phone]) who she sees virtually. Denies inpatient psychiatric hospitalizations. Denies history of suicide attempts or self-injurious behavior. No history of AVH. No legal history. No known history of violence. Denies history of trauma. No access to weapons/firearms. This is a 69 year old female, , no children, retired 3 years ago (was ) and domiciled with  in a private residence. PPH: Hx of Depression and LEO, engaged in outpatient tx with Dr Schmid x20+ years. No previous psychiatric admissions, denies previous suicide attempts, denies NSSIB. Pt denies legal issues and denies hx of violence/aggression. Denies access to firearms, referred by [facility] for poor functioning due to increased anxiety and depression.     At [facility], pt was seeking admission for worsening anxiety and depression over the last couple of months. She reported that current meds are not effective. Pt reported feeling increasingly depressed and anxious, perseverative, unable to relax, restless with difficulty showering; missing showers multiple x/week, anhedonia, poor motivation, difficulty with ADLs, weight loss,  fatigue/little energy which led her to stay home most of the time, decreased appetite but decent sleep but denied past and present suicidal and homicidal ideation, intent and plan. At home, she has been taking prn xanax 2-3 times daily and prn klonopin 2-3 times daily. States that she has been trying to address these symptoms with her outpatient psychiatrist but that no medication adjustments were helping so he recommended that she go to the ED.    Currently in outpatient treatment with psychiatrist Chester Schmid ([phone]) and therapist Cole Romero ([phone]) who she sees virtually. Denies inpatient psychiatric hospitalizations. Denies history of suicide attempts or self-injurious behavior. No history of AVH. No legal history. No known history of violence. Denies history of trauma. No access to weapons/firearms. Denies any prior history of ECT, TMS or ketamine treatment.

## 2024-03-26 NOTE — BH INPATIENT PSYCHIATRY PROGRESS NOTE - NSBHCHARTREVIEWVS_PSY_A_CORE FT
Vital Signs Last 24 Hrs  T(C): 36.7 (03-26-24 @ 07:21), Max: 36.7 (03-26-24 @ 07:21)  T(F): 98 (03-26-24 @ 07:21), Max: 98 (03-26-24 @ 07:21)  HR: --  BP: --  BP(mean): --  RR: 17 (03-26-24 @ 07:21) (17 - 17)  SpO2: --    Orthostatic VS  03-26-24 @ 07:21  Lying BP: --/-- HR: --  Sitting BP: 139/77 HR: 81  Standing BP: 134/83 HR: 88  Site: --  Mode: --  Orthostatic VS  03-25-24 @ 07:48  Lying BP: --/-- HR: --  Sitting BP: 144/82 HR: 80  Standing BP: 130/85 HR: 92  Site: --  Mode: --

## 2024-03-27 PROCEDURE — 99232 SBSQ HOSP IP/OBS MODERATE 35: CPT | Mod: FS

## 2024-03-27 RX ADMIN — Medication 20 MILLIGRAM(S): at 09:39

## 2024-03-27 RX ADMIN — Medication 10 MILLIGRAM(S): at 12:22

## 2024-03-27 RX ADMIN — Medication 1 MILLIGRAM(S): at 21:02

## 2024-03-27 RX ADMIN — Medication 25 MILLIGRAM(S): at 21:02

## 2024-03-27 RX ADMIN — Medication 81 MILLIGRAM(S): at 09:40

## 2024-03-27 RX ADMIN — Medication 1 MILLIGRAM(S): at 09:39

## 2024-03-27 RX ADMIN — Medication 25 MILLIGRAM(S): at 12:22

## 2024-03-27 RX ADMIN — AMLODIPINE BESYLATE 5 MILLIGRAM(S): 2.5 TABLET ORAL at 09:40

## 2024-03-27 RX ADMIN — Medication 20 MILLIGRAM(S): at 09:40

## 2024-03-27 RX ADMIN — ATORVASTATIN CALCIUM 20 MILLIGRAM(S): 80 TABLET, FILM COATED ORAL at 21:02

## 2024-03-27 RX ADMIN — Medication 1 MILLIGRAM(S): at 12:22

## 2024-03-27 RX ADMIN — Medication 25 MILLIGRAM(S): at 09:40

## 2024-03-27 RX ADMIN — LOSARTAN POTASSIUM 100 MILLIGRAM(S): 100 TABLET, FILM COATED ORAL at 09:40

## 2024-03-27 RX ADMIN — Medication 1 MILLIGRAM(S): at 16:31

## 2024-03-27 RX ADMIN — Medication 100 MILLIGRAM(S): at 21:02

## 2024-03-27 RX ADMIN — BREXPIPRAZOLE 1.5 MILLIGRAM(S): 0.25 TABLET ORAL at 09:40

## 2024-03-27 NOTE — BH INPATIENT PSYCHIATRY PROGRESS NOTE - NSBHASSESSSUMMFT_PSY_ALL_CORE
69-year-old female, , no children, retired 3 years ago (was ), domiciled with  in a private residence with . PPHx of Depression and LEO, engaged in outpatient tx with Dr Schmid x20+ years and therapist Cole Romero. No previous psychiatric admissions, denies previous suicide attempts, denies NSSIB. Pt denies all substance use. Pt denies legal issues and denies hx of violence/aggression. Denies access to firearms. Pertinent PMH HLD, HTN, IBS, BONIFACIO has CPAP (has not used for months). Referred by [facility] for poor functioning and inability to care for oneself due to worsening anxiety and depression.     Patient reports symptoms of depression including dysphoric mood, diminished interest in activities, and fatigue/little energy that prevents her from completing ADLs. She reports recent history of 20 lb weight loss "months ago" and states that she gained back 10 lbs. Additionally reports severe anxiety that prevents her from leaving the house, states that she last left her home ~1 month ago. Denies AVH. Denies SI/I/P. Displays insight into need for pharmacologic treatment for depression/anxiety.    On assessment, patient continues to endorse anxiety and low mood. Denies SI/I/P. Agreeable to increasing Prozac and ECT. Denies new medication side effects after beginning Prozac. ECT consult obtained. Dental consult scheduled for 3pm on Friday 3/29. PT consult placed as patient noted be unsteady while walking, appreciate recommendations.     Plan:  1. Admit to [address] [place] [address] legal status.    2. Routine observation with q 15 minute checks. Patient denies suicidality, remains in behavioral control.    3. Psych: continue home medication regimen pending collateral.  Increase Prozac to 30 mg daily  Klonopin 1 mg TID  Rexulti 1.5 mg daily  Buspirone 20 mg daily  Buspirone 10 mg afternoon  Hydroxyzine 25 mg TID  Trazodone 125 mg QHS  --PRN Ativan 2 mg IM for severe agitation    4. Medical: continue home medication regimen. Labs reviewed.  Amlodipine 5 mg daily   Aspirin 81 mg daily  Losartan 100 mg daily  Hydrochlorothiazide 25 mg po daily  Pravastatin 20 mg po daily  B complex/vitamin C/multivitamin    5. ECT  ECT consult completed 3/26  Dental consult scheduled for 3pm 3/29  Patient and  have reviewed ECT documents      6. Encourage individual, group, and milieu therapy.    7. Collaboration with outpatient psychiatrist Dr. Chester Schmid, [phone].    8. Pending collateral from , Kevin, [phone].    9. Dispo collaboration with social work.  Remission of symptoms: mood stability, improved anxiety, ability to complete ADLs.

## 2024-03-27 NOTE — BH INPATIENT PSYCHIATRY PROGRESS NOTE - NSBHCHARTREVIEWVS_PSY_A_CORE FT
Vital Signs Last 24 Hrs  T(C): 36.3 (03-27-24 @ 07:42), Max: 36.3 (03-27-24 @ 07:42)  T(F): 97.4 (03-27-24 @ 07:42), Max: 97.4 (03-27-24 @ 07:42)  HR: --  BP: --  BP(mean): --  RR: 17 (03-27-24 @ 07:42) (17 - 17)  SpO2: --    Orthostatic VS  03-27-24 @ 07:42  Lying BP: --/-- HR: --  Sitting BP: 128/76 HR: 84  Standing BP: 133/83 HR: 96  Site: --  Mode: --  Orthostatic VS  03-26-24 @ 07:21  Lying BP: --/-- HR: --  Sitting BP: 139/77 HR: 81  Standing BP: 134/83 HR: 88  Site: --  Mode: --

## 2024-03-27 NOTE — BH INPATIENT PSYCHIATRY PROGRESS NOTE - CURRENT MEDICATION
MEDICATIONS  (STANDING):  amLODIPine   Tablet 5 milliGRAM(s) Oral daily  aspirin  chewable 81 milliGRAM(s) Oral daily  atorvastatin 20 milliGRAM(s) Oral at bedtime  brexpiprazole 1.5 milliGRAM(s) Oral daily  busPIRone 10 milliGRAM(s) Oral <User Schedule>  busPIRone 20 milliGRAM(s) Oral <User Schedule>  clonazePAM  Tablet 1 milliGRAM(s) Oral three times a day  FLUoxetine 30 milliGRAM(s) Oral daily  hydrochlorothiazide 25 milliGRAM(s) Oral daily  hydrOXYzine hydrochloride 25 milliGRAM(s) Oral three times a day  losartan 100 milliGRAM(s) Oral daily  traZODone 100 milliGRAM(s) Oral at bedtime    MEDICATIONS  (PRN):  acetaminophen     Tablet .. 650 milliGRAM(s) Oral every 6 hours PRN Mild Pain (1 - 3), Moderate Pain (4 - 6), Severe Pain (7 - 10)  aluminum hydroxide/magnesium hydroxide/simethicone Suspension 30 milliLiter(s) Oral every 6 hours PRN Dyspepsia  LORazepam     Tablet 0.5 milliGRAM(s) Oral every 6 hours PRN Anxiety  LORazepam     Tablet 1 milliGRAM(s) Oral every 6 hours PRN Severe Anxiety/Agitation  LORazepam   Injectable 2 milliGRAM(s) IntraMuscular Once PRN severe agitation  magnesium hydroxide Suspension 30 milliLiter(s) Oral daily PRN Constipation   MEDICATIONS  (STANDING):  amLODIPine   Tablet 5 milliGRAM(s) Oral daily  aspirin  chewable 81 milliGRAM(s) Oral daily  atorvastatin 20 milliGRAM(s) Oral at bedtime  brexpiprazole 1.5 milliGRAM(s) Oral daily  busPIRone 20 milliGRAM(s) Oral <User Schedule>  busPIRone 10 milliGRAM(s) Oral <User Schedule>  clonazePAM  Tablet 1 milliGRAM(s) Oral three times a day  FLUoxetine 30 milliGRAM(s) Oral daily  hydrochlorothiazide 25 milliGRAM(s) Oral daily  hydrOXYzine hydrochloride 25 milliGRAM(s) Oral three times a day  losartan 100 milliGRAM(s) Oral daily  traZODone 100 milliGRAM(s) Oral at bedtime    MEDICATIONS  (PRN):  acetaminophen     Tablet .. 650 milliGRAM(s) Oral every 6 hours PRN Mild Pain (1 - 3), Moderate Pain (4 - 6), Severe Pain (7 - 10)  aluminum hydroxide/magnesium hydroxide/simethicone Suspension 30 milliLiter(s) Oral every 6 hours PRN Dyspepsia  LORazepam     Tablet 1 milliGRAM(s) Oral every 6 hours PRN Severe Anxiety/Agitation  LORazepam     Tablet 0.5 milliGRAM(s) Oral every 6 hours PRN Anxiety  LORazepam   Injectable 2 milliGRAM(s) IntraMuscular Once PRN severe agitation  magnesium hydroxide Suspension 30 milliLiter(s) Oral daily PRN Constipation   MEDICATIONS  (STANDING):  amLODIPine   Tablet 5 milliGRAM(s) Oral daily  aspirin  chewable 81 milliGRAM(s) Oral daily  atorvastatin 20 milliGRAM(s) Oral at bedtime  brexpiprazole 1.5 milliGRAM(s) Oral daily  busPIRone 10 milliGRAM(s) Oral <User Schedule>  busPIRone 20 milliGRAM(s) Oral <User Schedule>  clonazePAM  Tablet 1 milliGRAM(s) Oral three times a day  FLUoxetine 30 milliGRAM(s) Oral daily  hydrochlorothiazide 25 milliGRAM(s) Oral daily  hydrOXYzine hydrochloride 25 milliGRAM(s) Oral three times a day  losartan 100 milliGRAM(s) Oral daily  traZODone 100 milliGRAM(s) Oral at bedtime    MEDICATIONS  (PRN):  acetaminophen     Tablet .. 650 milliGRAM(s) Oral every 6 hours PRN Mild Pain (1 - 3), Moderate Pain (4 - 6), Severe Pain (7 - 10)  aluminum hydroxide/magnesium hydroxide/simethicone Suspension 30 milliLiter(s) Oral every 6 hours PRN Dyspepsia  LORazepam     Tablet 1 milliGRAM(s) Oral every 6 hours PRN Severe Anxiety/Agitation  LORazepam     Tablet 0.5 milliGRAM(s) Oral every 6 hours PRN Anxiety  LORazepam   Injectable 2 milliGRAM(s) IntraMuscular Once PRN severe agitation  magnesium hydroxide Suspension 30 milliLiter(s) Oral daily PRN Constipation

## 2024-03-27 NOTE — BH INPATIENT PSYCHIATRY PROGRESS NOTE - NSBHMETABOLIC_PSY_ALL_CORE_FT
BMI: BMI (kg/m2): 33.6 (03-22-24 @ 12:45)  HbA1c:   Glucose:   BP: --Vital Signs Last 24 Hrs  T(C): 36.3 (03-27-24 @ 07:42), Max: 36.3 (03-27-24 @ 07:42)  T(F): 97.4 (03-27-24 @ 07:42), Max: 97.4 (03-27-24 @ 07:42)  HR: --  BP: --  BP(mean): --  RR: 17 (03-27-24 @ 07:42) (17 - 17)  SpO2: --    Orthostatic VS  03-27-24 @ 07:42  Lying BP: --/-- HR: --  Sitting BP: 128/76 HR: 84  Standing BP: 133/83 HR: 96  Site: --  Mode: --  Orthostatic VS  03-26-24 @ 07:21  Lying BP: --/-- HR: --  Sitting BP: 139/77 HR: 81  Standing BP: 134/83 HR: 88  Site: --  Mode: --    Lipid Panel:

## 2024-03-27 NOTE — BH INPATIENT PSYCHIATRY PROGRESS NOTE - NSBHFUPINTERVALHXFT_PSY_A_CORE
Patient seen for follow up for depression and anxiety. Chart reviewed and case discussed with treatment team. Upon assessment, patient appears anxious with low mood. She reports feeling "like crap". When asked to elaborate, she describes that she does not want to be here, has not been sleeping well (~4 hrs per night), and has persistent anxiety that is worse in the mornings when she wakes up. She feels that her medications are helping with anxiety. She has been going to some group sessions, but reports her presence and participation are limited by her anxiety. Her appetite is adequate. She is amenable to increasing Prozac and is motivated to begin ECT. ECT MD saw her yesterday to discuss treatment plan. She was able to review ECT documents. Yesterday, she was noted to be unsteady while walking. PT consult ordered. Other then reporting mild sedation from the medication, she denies other side effects such as GI upset. Denies SI/I/P, visual and auditory hallucinations.

## 2024-03-28 PROCEDURE — 99232 SBSQ HOSP IP/OBS MODERATE 35: CPT | Mod: FS

## 2024-03-28 RX ORDER — SENNA PLUS 8.6 MG/1
2 TABLET ORAL AT BEDTIME
Refills: 0 | Status: DISCONTINUED | OUTPATIENT
Start: 2024-03-28 | End: 2024-04-26

## 2024-03-28 RX ORDER — CLONAZEPAM 1 MG
1 TABLET ORAL THREE TIMES A DAY
Refills: 0 | Status: DISCONTINUED | OUTPATIENT
Start: 2024-03-28 | End: 2024-04-04

## 2024-03-28 RX ADMIN — SENNA PLUS 2 TABLET(S): 8.6 TABLET ORAL at 21:42

## 2024-03-28 RX ADMIN — Medication 81 MILLIGRAM(S): at 08:41

## 2024-03-28 RX ADMIN — ATORVASTATIN CALCIUM 20 MILLIGRAM(S): 80 TABLET, FILM COATED ORAL at 21:43

## 2024-03-28 RX ADMIN — Medication 25 MILLIGRAM(S): at 21:42

## 2024-03-28 RX ADMIN — Medication 10 MILLIGRAM(S): at 12:53

## 2024-03-28 RX ADMIN — Medication 1 MILLIGRAM(S): at 21:42

## 2024-03-28 RX ADMIN — Medication 20 MILLIGRAM(S): at 08:41

## 2024-03-28 RX ADMIN — Medication 25 MILLIGRAM(S): at 12:53

## 2024-03-28 RX ADMIN — LOSARTAN POTASSIUM 100 MILLIGRAM(S): 100 TABLET, FILM COATED ORAL at 08:41

## 2024-03-28 RX ADMIN — Medication 25 MILLIGRAM(S): at 08:41

## 2024-03-28 RX ADMIN — AMLODIPINE BESYLATE 5 MILLIGRAM(S): 2.5 TABLET ORAL at 08:41

## 2024-03-28 RX ADMIN — Medication 100 MILLIGRAM(S): at 21:42

## 2024-03-28 RX ADMIN — Medication 1 MILLIGRAM(S): at 17:41

## 2024-03-28 RX ADMIN — BREXPIPRAZOLE 1.5 MILLIGRAM(S): 0.25 TABLET ORAL at 08:40

## 2024-03-28 RX ADMIN — Medication 1 MILLIGRAM(S): at 11:22

## 2024-03-28 RX ADMIN — Medication 1 MILLIGRAM(S): at 12:53

## 2024-03-28 RX ADMIN — Medication 1 MILLIGRAM(S): at 08:41

## 2024-03-28 RX ADMIN — Medication 30 MILLIGRAM(S): at 08:41

## 2024-03-28 NOTE — BH INPATIENT PSYCHIATRY PROGRESS NOTE - NSBHFUPINTERVALHXFT_PSY_A_CORE
Patient seen for follow up for depression and anxiety. Chart reviewed and case discussed with treatment team. Upon assessment, patient appears anxious with flat affect. She describes continued anxiety of similar severity as previous days. She is nervous about starting ECT on Monday, specifically about waiting for her appointments. She was assured that a staff member would accompany her to ECT sessions. She has attended group sessions "sometimes". She reports poor sleep with 4 hours per night due to uncomfortable bed. Her appetite is "fine". She endorses constipation today with last BM 2 days ago. She is amenable to starting laxatives for constipation. Denies nausea, vomiting, diarrhea. She denies visual and auditory hallucinations. Denies SI/I/P.   Patient seen for follow up for depression and anxiety. Chart reviewed and case discussed with treatment team. Upon assessment, patient appears anxious with flat affect, reported mood as "sad." She describes continued anxiety of similar severity as previous days. She is nervous about starting ECT on Monday, specifically about waiting for her appointments. She was assured that a staff member would accompany her to ECT sessions. She has attended group sessions "sometimes". She reports poor sleep with 4 hours per night due to uncomfortable bed. Her appetite is "fine". She endorses constipation today with last BM 2 days ago. She is amenable to starting laxatives for constipation. Denies nausea, vomiting, diarrhea. She denies visual and auditory hallucinations. Denies SI/I/P.

## 2024-03-28 NOTE — BH INPATIENT PSYCHIATRY PROGRESS NOTE - PRN MEDS
MEDICATIONS  (PRN):  acetaminophen     Tablet .. 650 milliGRAM(s) Oral every 6 hours PRN Mild Pain (1 - 3), Moderate Pain (4 - 6), Severe Pain (7 - 10)  aluminum hydroxide/magnesium hydroxide/simethicone Suspension 30 milliLiter(s) Oral every 6 hours PRN Dyspepsia  LORazepam     Tablet 1 milliGRAM(s) Oral every 6 hours PRN Severe Anxiety/Agitation  LORazepam     Tablet 0.5 milliGRAM(s) Oral every 6 hours PRN Anxiety  LORazepam   Injectable 2 milliGRAM(s) IntraMuscular Once PRN severe agitation  magnesium hydroxide Suspension 30 milliLiter(s) Oral daily PRN Constipation   MEDICATIONS  (PRN):  acetaminophen     Tablet .. 650 milliGRAM(s) Oral every 6 hours PRN Mild Pain (1 - 3), Moderate Pain (4 - 6), Severe Pain (7 - 10)  aluminum hydroxide/magnesium hydroxide/simethicone Suspension 30 milliLiter(s) Oral every 6 hours PRN Dyspepsia  bisacodyl 10 milliGRAM(s) Oral once PRN Constipation  LORazepam     Tablet 0.5 milliGRAM(s) Oral every 6 hours PRN Anxiety  LORazepam     Tablet 1 milliGRAM(s) Oral every 6 hours PRN Severe Anxiety/Agitation  LORazepam   Injectable 2 milliGRAM(s) IntraMuscular Once PRN severe agitation  magnesium hydroxide Suspension 30 milliLiter(s) Oral daily PRN Constipation

## 2024-03-28 NOTE — BH INPATIENT PSYCHIATRY PROGRESS NOTE - CURRENT MEDICATION
MEDICATIONS  (STANDING):  amLODIPine   Tablet 5 milliGRAM(s) Oral daily  aspirin  chewable 81 milliGRAM(s) Oral daily  atorvastatin 20 milliGRAM(s) Oral at bedtime  brexpiprazole 1.5 milliGRAM(s) Oral daily  busPIRone 10 milliGRAM(s) Oral <User Schedule>  busPIRone 20 milliGRAM(s) Oral <User Schedule>  clonazePAM  Tablet 1 milliGRAM(s) Oral three times a day  FLUoxetine 30 milliGRAM(s) Oral daily  hydrochlorothiazide 25 milliGRAM(s) Oral daily  hydrOXYzine hydrochloride 25 milliGRAM(s) Oral three times a day  losartan 100 milliGRAM(s) Oral daily  traZODone 100 milliGRAM(s) Oral at bedtime    MEDICATIONS  (PRN):  acetaminophen     Tablet .. 650 milliGRAM(s) Oral every 6 hours PRN Mild Pain (1 - 3), Moderate Pain (4 - 6), Severe Pain (7 - 10)  aluminum hydroxide/magnesium hydroxide/simethicone Suspension 30 milliLiter(s) Oral every 6 hours PRN Dyspepsia  LORazepam     Tablet 1 milliGRAM(s) Oral every 6 hours PRN Severe Anxiety/Agitation  LORazepam     Tablet 0.5 milliGRAM(s) Oral every 6 hours PRN Anxiety  LORazepam   Injectable 2 milliGRAM(s) IntraMuscular Once PRN severe agitation  magnesium hydroxide Suspension 30 milliLiter(s) Oral daily PRN Constipation   MEDICATIONS  (STANDING):  amLODIPine   Tablet 5 milliGRAM(s) Oral daily  aspirin  chewable 81 milliGRAM(s) Oral daily  atorvastatin 20 milliGRAM(s) Oral at bedtime  brexpiprazole 1.5 milliGRAM(s) Oral daily  busPIRone 10 milliGRAM(s) Oral <User Schedule>  busPIRone 20 milliGRAM(s) Oral <User Schedule>  clonazePAM  Tablet 1 milliGRAM(s) Oral three times a day  FLUoxetine 30 milliGRAM(s) Oral daily  hydrochlorothiazide 25 milliGRAM(s) Oral daily  hydrOXYzine hydrochloride 25 milliGRAM(s) Oral three times a day  losartan 100 milliGRAM(s) Oral daily  senna 2 Tablet(s) Oral at bedtime  traZODone 100 milliGRAM(s) Oral at bedtime    MEDICATIONS  (PRN):  acetaminophen     Tablet .. 650 milliGRAM(s) Oral every 6 hours PRN Mild Pain (1 - 3), Moderate Pain (4 - 6), Severe Pain (7 - 10)  aluminum hydroxide/magnesium hydroxide/simethicone Suspension 30 milliLiter(s) Oral every 6 hours PRN Dyspepsia  bisacodyl 10 milliGRAM(s) Oral once PRN Constipation  LORazepam     Tablet 0.5 milliGRAM(s) Oral every 6 hours PRN Anxiety  LORazepam     Tablet 1 milliGRAM(s) Oral every 6 hours PRN Severe Anxiety/Agitation  LORazepam   Injectable 2 milliGRAM(s) IntraMuscular Once PRN severe agitation  magnesium hydroxide Suspension 30 milliLiter(s) Oral daily PRN Constipation

## 2024-03-28 NOTE — BH INPATIENT PSYCHIATRY PROGRESS NOTE - NSBHASSESSSUMMFT_PSY_ALL_CORE
69-year-old female, , no children, retired 3 years ago (was ), domiciled with  in a private residence with . PPHx of Depression and LEO, engaged in outpatient tx with Dr Schmid x20+ years and therapist Cole Romero. No previous psychiatric admissions, denies previous suicide attempts, denies NSSIB. Pt denies all substance use. Pt denies legal issues and denies hx of violence/aggression. Denies access to firearms. Pertinent PMH HLD, HTN, IBS, BONIFACIO has CPAP (has not used for months). Referred by [facility] for poor functioning and inability to care for oneself due to worsening anxiety and depression.     Patient reports symptoms of depression including dysphoric mood, diminished interest in activities, and fatigue/little energy that prevents her from completing ADLs. She reports recent history of 20 lb weight loss "months ago" and states that she gained back 10 lbs. Additionally reports severe anxiety that prevents her from leaving the house, states that she last left her home ~1 month ago. Denies AVH. Denies SI/I/P. Displays insight into need for pharmacologic treatment for depression/anxiety.    On assessment, patient continues to endorse anxiety and low mood. Endorsing constipation today with last BM 2 days ago. Agreeable to starting laxatives. ECT consult obtained. Dental consult scheduled for 3pm on Friday 3/29. Denies SI/I/P.     Plan:  1. Admit to [address] [place] [address] 9.13 legal status.    2. Routine observation with q 15 minute checks. Patient denies suicidality, remains in behavioral control.    3. Psych: continue home medication regimen pending collateral.  Prozac 30 mg daily  Klonopin 1 mg TID  Rexulti 1.5 mg daily  Buspirone 20 mg daily  Buspirone 10 mg afternoon  Hydroxyzine 25 mg TID  Trazodone 125 mg QHS  --PRN Ativan 2 mg IM for severe agitation    4. Medical: continue home medication regimen. Labs reviewed.  Amlodipine 5 mg daily   Aspirin 81 mg daily  Losartan 100 mg daily  Hydrochlorothiazide 25 mg po daily  Pravastatin 20 mg po daily  B complex/vitamin C/multivitamin    5. ECT  ECT consult completed 3/26  Dental consult scheduled for 3pm 3/29  Patient and  have reviewed ECT documents      6. Encourage individual, group, and milieu therapy.    7. Collaboration with outpatient psychiatrist Dr. Chester Schmid, [phone].    8. Pending collateral from , Kevin, [phone].    9. Dispo collaboration with social work.  Remission of symptoms: mood stability, improved

## 2024-03-28 NOTE — BH INPATIENT PSYCHIATRY PROGRESS NOTE - NSBHCHARTREVIEWVS_PSY_A_CORE FT
Vital Signs Last 24 Hrs  T(C): 36.3 (03-28-24 @ 07:32), Max: 36.3 (03-28-24 @ 07:32)  T(F): 97.4 (03-28-24 @ 07:32), Max: 97.4 (03-28-24 @ 07:32)  HR: --  BP: --  BP(mean): --  RR: 18 (03-28-24 @ 07:32) (18 - 18)  SpO2: --    Orthostatic VS  03-28-24 @ 07:32  Lying BP: --/-- HR: --  Sitting BP: 135/63 HR: 86  Standing BP: 120/77 HR: 96  Site: --  Mode: --  Orthostatic VS  03-27-24 @ 07:42  Lying BP: --/-- HR: --  Sitting BP: 128/76 HR: 84  Standing BP: 133/83 HR: 96  Site: --  Mode: --

## 2024-03-28 NOTE — BH INPATIENT PSYCHIATRY PROGRESS NOTE - NSBHMETABOLIC_PSY_ALL_CORE_FT
BMI: BMI (kg/m2): 33.6 (03-22-24 @ 12:45)  HbA1c:   Glucose:   BP: --Vital Signs Last 24 Hrs  T(C): 36.3 (03-28-24 @ 07:32), Max: 36.3 (03-28-24 @ 07:32)  T(F): 97.4 (03-28-24 @ 07:32), Max: 97.4 (03-28-24 @ 07:32)  HR: --  BP: --  BP(mean): --  RR: 18 (03-28-24 @ 07:32) (18 - 18)  SpO2: --    Orthostatic VS  03-28-24 @ 07:32  Lying BP: --/-- HR: --  Sitting BP: 135/63 HR: 86  Standing BP: 120/77 HR: 96  Site: --  Mode: --  Orthostatic VS  03-27-24 @ 07:42  Lying BP: --/-- HR: --  Sitting BP: 128/76 HR: 84  Standing BP: 133/83 HR: 96  Site: --  Mode: --    Lipid Panel:

## 2024-03-29 ENCOUNTER — APPOINTMENT (OUTPATIENT)
Age: 69
End: 2024-03-29
Payer: SELF-PAY

## 2024-03-29 PROCEDURE — 99232 SBSQ HOSP IP/OBS MODERATE 35: CPT | Mod: FS

## 2024-03-29 PROCEDURE — D0140: CPT

## 2024-03-29 PROCEDURE — D0330 PANORAMIC RADIOGRAPHIC IMAGE: CPT

## 2024-03-29 RX ADMIN — Medication 1 MILLIGRAM(S): at 21:59

## 2024-03-29 RX ADMIN — Medication 20 MILLIGRAM(S): at 08:26

## 2024-03-29 RX ADMIN — Medication 30 MILLIGRAM(S): at 08:27

## 2024-03-29 RX ADMIN — Medication 10 MILLIGRAM(S): at 12:31

## 2024-03-29 RX ADMIN — LOSARTAN POTASSIUM 100 MILLIGRAM(S): 100 TABLET, FILM COATED ORAL at 08:27

## 2024-03-29 RX ADMIN — Medication 25 MILLIGRAM(S): at 08:27

## 2024-03-29 RX ADMIN — Medication 100 MILLIGRAM(S): at 22:02

## 2024-03-29 RX ADMIN — Medication 1 MILLIGRAM(S): at 12:31

## 2024-03-29 RX ADMIN — Medication 81 MILLIGRAM(S): at 08:27

## 2024-03-29 RX ADMIN — Medication 25 MILLIGRAM(S): at 21:59

## 2024-03-29 RX ADMIN — AMLODIPINE BESYLATE 5 MILLIGRAM(S): 2.5 TABLET ORAL at 08:26

## 2024-03-29 RX ADMIN — ATORVASTATIN CALCIUM 20 MILLIGRAM(S): 80 TABLET, FILM COATED ORAL at 21:59

## 2024-03-29 RX ADMIN — Medication 1 MILLIGRAM(S): at 08:26

## 2024-03-29 RX ADMIN — BREXPIPRAZOLE 1.5 MILLIGRAM(S): 0.25 TABLET ORAL at 08:27

## 2024-03-29 RX ADMIN — SENNA PLUS 2 TABLET(S): 8.6 TABLET ORAL at 21:59

## 2024-03-29 RX ADMIN — Medication 25 MILLIGRAM(S): at 12:31

## 2024-03-29 NOTE — BH INPATIENT PSYCHIATRY PROGRESS NOTE - CURRENT MEDICATION
MEDICATIONS  (STANDING):  amLODIPine   Tablet 5 milliGRAM(s) Oral daily  aspirin  chewable 81 milliGRAM(s) Oral daily  atorvastatin 20 milliGRAM(s) Oral at bedtime  brexpiprazole 1.5 milliGRAM(s) Oral daily  busPIRone 10 milliGRAM(s) Oral <User Schedule>  busPIRone 20 milliGRAM(s) Oral <User Schedule>  clonazePAM  Tablet 1 milliGRAM(s) Oral three times a day  FLUoxetine 30 milliGRAM(s) Oral daily  hydrochlorothiazide 25 milliGRAM(s) Oral daily  hydrOXYzine hydrochloride 25 milliGRAM(s) Oral three times a day  losartan 100 milliGRAM(s) Oral daily  senna 2 Tablet(s) Oral at bedtime  traZODone 100 milliGRAM(s) Oral at bedtime    MEDICATIONS  (PRN):  acetaminophen     Tablet .. 650 milliGRAM(s) Oral every 6 hours PRN Mild Pain (1 - 3), Moderate Pain (4 - 6), Severe Pain (7 - 10)  aluminum hydroxide/magnesium hydroxide/simethicone Suspension 30 milliLiter(s) Oral every 6 hours PRN Dyspepsia  bisacodyl 10 milliGRAM(s) Oral once PRN Constipation  LORazepam     Tablet 1 milliGRAM(s) Oral every 6 hours PRN Severe Anxiety/Agitation  LORazepam     Tablet 0.5 milliGRAM(s) Oral every 6 hours PRN Anxiety  LORazepam   Injectable 2 milliGRAM(s) IntraMuscular Once PRN severe agitation  magnesium hydroxide Suspension 30 milliLiter(s) Oral daily PRN Constipation   MEDICATIONS  (STANDING):  amLODIPine   Tablet 5 milliGRAM(s) Oral daily  aspirin  chewable 81 milliGRAM(s) Oral daily  atorvastatin 20 milliGRAM(s) Oral at bedtime  brexpiprazole 1.5 milliGRAM(s) Oral daily  busPIRone 10 milliGRAM(s) Oral <User Schedule>  busPIRone 20 milliGRAM(s) Oral <User Schedule>  clonazePAM  Tablet 1 milliGRAM(s) Oral three times a day  FLUoxetine 30 milliGRAM(s) Oral daily  hydrochlorothiazide 25 milliGRAM(s) Oral daily  hydrOXYzine hydrochloride 25 milliGRAM(s) Oral three times a day  losartan 100 milliGRAM(s) Oral daily  senna 2 Tablet(s) Oral at bedtime  traZODone 100 milliGRAM(s) Oral at bedtime    MEDICATIONS  (PRN):  acetaminophen     Tablet .. 650 milliGRAM(s) Oral every 6 hours PRN Mild Pain (1 - 3), Moderate Pain (4 - 6), Severe Pain (7 - 10)  aluminum hydroxide/magnesium hydroxide/simethicone Suspension 30 milliLiter(s) Oral every 6 hours PRN Dyspepsia  bisacodyl 10 milliGRAM(s) Oral once PRN Constipation  LORazepam     Tablet 0.5 milliGRAM(s) Oral every 6 hours PRN Anxiety  LORazepam     Tablet 1 milliGRAM(s) Oral every 6 hours PRN Severe Anxiety/Agitation  LORazepam   Injectable 2 milliGRAM(s) IntraMuscular Once PRN severe agitation  magnesium hydroxide Suspension 30 milliLiter(s) Oral daily PRN Constipation

## 2024-03-29 NOTE — BH INPATIENT PSYCHIATRY PROGRESS NOTE - NSBHFUPINTERVALHXFT_PSY_A_CORE
Patient seen for follow up for depression and anxiety. Chart reviewed and case discussed with treatment team. Patient describes mood as "not good", endorsing anxiety and restlessness. Upon assessment, patient appears to have flat affect, psychomotor retardation, and soft speech. She does not appear restless. Interviewers asked patient to elaborate on what her anxiety feels like. Patient responded "nervousness" and "pit in my stomach." Interviewers described symptoms that are typically associated with depression and with anxiety, and patient feels she aligns more with the depressive symptoms. Elaborates that her mood is more "sad" and that her restlessness are because she wants to go back to lie down in bed where she feels better. The medications are causing her to feel sleepy. Denies GI symptoms including constipation. She feels wobbly when she walks but does not feel like she will fall. She attends groups "sometimes". Reports good appetite and that her sleep has improved. Patient is especially nervous about her dental appointment today and about starting ECT next week. Denies visual/auditory hallucinations. Denies SI/I/P.  31-Jul-2020 15:17 Patient seen for follow up for depression and anxiety. Chart reviewed and case discussed with treatment team. Patient describes mood as "not good", endorsing anxiety and restlessness. Upon assessment, patient appears to have flat affect, psychomotor retardation, and soft speech. She does not appear restless. Interviewers asked patient to elaborate on what her anxiety feels like. Patient responded "nervousness" and "pit in my stomach." Interviewers described symptoms that are typically associated with depression and with anxiety, and patient feels she aligns more with the depressive symptoms. Elaborates that her mood is more "sad" and that her restlessness are because she wants to go back to lie down in bed where she feels better. The medications are causing her to feel sleepy. Denies GI symptoms including constipation. She feels wobbly when she walks but does not feel like she will fall. She attends groups "sometimes". Reports good appetite and that her sleep has improved. Patient is especially nervous about her dental appointment today and about starting ECT next week. Denies visual/auditory hallucinations. Denies SI/I/P. Called  this afternoon to inform him that she went for her dental appointment today and that her first ECT session is scheduled for Monday. He asked what the dental appointment entailed so that he is informed when he speaks to her this evening, was appreciative of the update.  Patient seen for follow up for depression and anxiety. Chart reviewed and case discussed with treatment team. Patient describes mood as "not good", endorsing anxiety and restlessness. Upon assessment, patient appears to have flat affect, psychomotor retardation, and soft speech. She does not appear restless. Interviewers asked patient to elaborate on what her anxiety feels like. Patient responded "nervousness" and "pit in my stomach." Interviewers described symptoms that are typically associated with depression and with anxiety, and patient feels she aligns more with the depressive symptoms. Elaborates that her mood is more "sad" and that her restlessness are because she wants to go back to lie down in bed where she feels better. The medications are causing her to feel sleepy. Denies GI symptoms including constipation. She feels wobbly when she walks but does not feel like she will fall. She attends groups "sometimes". Reports good appetite and that her sleep has improved. Patient is especially nervous about her dental appointment today and about starting ECT next week. Denies visual/auditory hallucinations. Denies SI/I/P.     Called  this afternoon to inform him that she went for her dental appointment today and that her first ECT session is scheduled for Monday. He asked what the dental appointment entailed so that he is informed when he speaks to her this evening, was appreciative of the update.

## 2024-03-29 NOTE — BH INPATIENT PSYCHIATRY PROGRESS NOTE - NSBHMETABOLIC_PSY_ALL_CORE_FT
BMI: BMI (kg/m2): 33.6 (03-22-24 @ 12:45)  HbA1c:   Glucose:   BP: --Vital Signs Last 24 Hrs  T(C): 36.3 (03-29-24 @ 07:14), Max: 36.3 (03-29-24 @ 07:14)  T(F): 97.4 (03-29-24 @ 07:14), Max: 97.4 (03-29-24 @ 07:14)  HR: --  BP: --  BP(mean): --  RR: 18 (03-29-24 @ 07:14) (18 - 18)  SpO2: --    Orthostatic VS  03-29-24 @ 07:14  Lying BP: --/-- HR: --  Sitting BP: 124/74 HR: 93  Standing BP: 120/70 HR: 108  Site: --  Mode: --  Orthostatic VS  03-28-24 @ 07:32  Lying BP: --/-- HR: --  Sitting BP: 135/63 HR: 86  Standing BP: 120/77 HR: 96  Site: --  Mode: --    Lipid Panel:

## 2024-03-29 NOTE — PSYCHIATRIC REHAB INITIAL EVALUATION - NSBHPRRECOMMEND_PSY_ALL_CORE
Writer met with pt to orient pt to unit, psychiatric rehabilitation staff, and psychiatric rehabilitation staff services. Writer collaborated with pt in choosing an appropriate psychiatric rehabilitation goal. Pt reported current concerns related to anxiety and depression symptoms, thus pt's goal will be to identify coping skills to meet pt's needs. Writer encouraged pt to engage in treatment and programming to facilitate progress. Psychiatric rehabilitation staff will provide support and encouragement. Pt denied SI/HI, AH/VH.

## 2024-03-29 NOTE — BH INPATIENT PSYCHIATRY PROGRESS NOTE - NSBHCHARTREVIEWVS_PSY_A_CORE FT
Vital Signs Last 24 Hrs  T(C): 36.3 (03-29-24 @ 07:14), Max: 36.3 (03-29-24 @ 07:14)  T(F): 97.4 (03-29-24 @ 07:14), Max: 97.4 (03-29-24 @ 07:14)  HR: --  BP: --  BP(mean): --  RR: 18 (03-29-24 @ 07:14) (18 - 18)  SpO2: --    Orthostatic VS  03-29-24 @ 07:14  Lying BP: --/-- HR: --  Sitting BP: 124/74 HR: 93  Standing BP: 120/70 HR: 108  Site: --  Mode: --  Orthostatic VS  03-28-24 @ 07:32  Lying BP: --/-- HR: --  Sitting BP: 135/63 HR: 86  Standing BP: 120/77 HR: 96  Site: --  Mode: --

## 2024-03-29 NOTE — PSYCHIATRIC REHAB INITIAL EVALUATION - NSBHALCSUBTREAT_PSY_ALL_CORE
Therapist - Dr. Cole Boswell - Virtual sessions  Psychiatrist - Chester Schmid (Pt reported seeing provider at [address] [place] [address])/Outpatient clinic (specify)

## 2024-03-29 NOTE — CONSULT NOTE ADULT - SUBJECTIVE AND OBJECTIVE BOX
PAST MEDICAL & SURGICAL HISTORY:  MDD (major depressive disorder)    Obstructive sleep apnea    Hypertension    Hyperlipidemia    Irritable bowel disease    History of retinal hemorrhage    No significant past surgical history          MEDICATIONS  (STANDING):  amLODIPine   Tablet 5 milliGRAM(s) Oral daily  aspirin  chewable 81 milliGRAM(s) Oral daily  atorvastatin 20 milliGRAM(s) Oral at bedtime  brexpiprazole 1.5 milliGRAM(s) Oral daily  busPIRone 10 milliGRAM(s) Oral <User Schedule>  busPIRone 20 milliGRAM(s) Oral <User Schedule>  clonazePAM  Tablet 1 milliGRAM(s) Oral three times a day  FLUoxetine 30 milliGRAM(s) Oral daily  hydrochlorothiazide 25 milliGRAM(s) Oral daily  hydrOXYzine hydrochloride 25 milliGRAM(s) Oral three times a day  losartan 100 milliGRAM(s) Oral daily  senna 2 Tablet(s) Oral at bedtime  traZODone 100 milliGRAM(s) Oral at bedtime    MEDICATIONS  (PRN):  acetaminophen     Tablet .. 650 milliGRAM(s) Oral every 6 hours PRN Mild Pain (1 - 3), Moderate Pain (4 - 6), Severe Pain (7 - 10)  aluminum hydroxide/magnesium hydroxide/simethicone Suspension 30 milliLiter(s) Oral every 6 hours PRN Dyspepsia  bisacodyl 10 milliGRAM(s) Oral once PRN Constipation  LORazepam     Tablet 1 milliGRAM(s) Oral every 6 hours PRN Severe Anxiety/Agitation  LORazepam     Tablet 0.5 milliGRAM(s) Oral every 6 hours PRN Anxiety  LORazepam   Injectable 2 milliGRAM(s) IntraMuscular Once PRN severe agitation  magnesium hydroxide Suspension 30 milliLiter(s) Oral daily PRN Constipation      ALLERGIES:  erythromycin (Hives)       Patient presents to clinic with aide for ECT dental clearance. Pt reports no tooth pain or loose teeth.      EOE: WNL, no swelling or asymmetries.  IOE:    - Permanent dentition  - Multiple fixed restorations      Pano taken.    Clinical and radiographic findings:    - No mobility    - (-) percussion, (-) palpation, (-) swelling.      Alginate impression taken of maxillary arch for soft occlusal guard fabrication.     Occlusal guard ready for pickup by staff from [facility] Monday 4/1/24. Occlusal guard recommended to be used during ECT tx. Okay to proceed with ECT tx from a dental standpoint with use of soft occlusal guard.    NV: none, occlusal guard ready for pickup by staff on Monday 4/1/24.       Symone Torres DMD ([id number])  Ortega Mccoy DDS ([id number])   Attending: Dr. Pederson

## 2024-03-29 NOTE — BH INPATIENT PSYCHIATRY PROGRESS NOTE - PRN MEDS
MEDICATIONS  (PRN):  acetaminophen     Tablet .. 650 milliGRAM(s) Oral every 6 hours PRN Mild Pain (1 - 3), Moderate Pain (4 - 6), Severe Pain (7 - 10)  aluminum hydroxide/magnesium hydroxide/simethicone Suspension 30 milliLiter(s) Oral every 6 hours PRN Dyspepsia  bisacodyl 10 milliGRAM(s) Oral once PRN Constipation  LORazepam     Tablet 1 milliGRAM(s) Oral every 6 hours PRN Severe Anxiety/Agitation  LORazepam     Tablet 0.5 milliGRAM(s) Oral every 6 hours PRN Anxiety  LORazepam   Injectable 2 milliGRAM(s) IntraMuscular Once PRN severe agitation  magnesium hydroxide Suspension 30 milliLiter(s) Oral daily PRN Constipation   MEDICATIONS  (PRN):  acetaminophen     Tablet .. 650 milliGRAM(s) Oral every 6 hours PRN Mild Pain (1 - 3), Moderate Pain (4 - 6), Severe Pain (7 - 10)  aluminum hydroxide/magnesium hydroxide/simethicone Suspension 30 milliLiter(s) Oral every 6 hours PRN Dyspepsia  bisacodyl 10 milliGRAM(s) Oral once PRN Constipation  LORazepam     Tablet 0.5 milliGRAM(s) Oral every 6 hours PRN Anxiety  LORazepam     Tablet 1 milliGRAM(s) Oral every 6 hours PRN Severe Anxiety/Agitation  LORazepam   Injectable 2 milliGRAM(s) IntraMuscular Once PRN severe agitation  magnesium hydroxide Suspension 30 milliLiter(s) Oral daily PRN Constipation

## 2024-03-29 NOTE — BH INPATIENT PSYCHIATRY PROGRESS NOTE - OTHER
psychomotor retardation  Patient appears more dysthymic than anxious  Patient noted to be mildly unsteady while walking, no falls  "not good", elaborated more "sad" than anxious

## 2024-03-29 NOTE — BH INPATIENT PSYCHIATRY PROGRESS NOTE - NSBHASSESSSUMMFT_PSY_ALL_CORE
69-year-old female, , no children, retired 3 years ago (was ), domiciled with  in a private residence with . PPHx of Depression and LEO, engaged in outpatient tx with Dr Schmid x20+ years and therapist Cole Romero. No previous psychiatric admissions, denies previous suicide attempts, denies NSSIB. Pt denies all substance use. Pt denies legal issues and denies hx of violence/aggression. Denies access to firearms. Pertinent PMH HLD, HTN, IBS, BONIFACIO has CPAP (has not used for months). Referred by [facility] for poor functioning and inability to care for oneself due to worsening anxiety and depression.     Patient reports symptoms of depression including dysphoric mood, diminished interest in activities, and fatigue/little energy that prevents her from completing ADLs. She reports recent history of 20 lb weight loss "months ago" and states that she gained back 10 lbs. Additionally reports severe anxiety that prevents her from leaving the house, states that she last left her home ~1 month ago. Denies AVH. Denies SI/I/P. Displays insight into need for pharmacologic treatment for depression/anxiety.    On assessment, patient appears to have flat affect, psychomotor retardation, and soft speech. Describes her mood as more sad than anxious. Dental consult scheduled for 3pm on Friday 3/29. Denies A/V hallucinations. Denies SI/I/P.     Plan:  1. Admit to [facility], 9.13 legal status.    2. Routine observation with q 15 minute checks. Patient denies suicidality, remains in behavioral control.    3. Psych: continue home medication regimen pending collateral.  Prozac 30 mg daily  Klonopin 1 mg TID  Rexulti 1.5 mg daily  Buspirone 20 mg daily  Buspirone 10 mg afternoon  Hydroxyzine 25 mg TID  Trazodone 125 mg QHS  --PRN Ativan 2 mg IM for severe agitation    4. Medical: continue home medication regimen. Labs reviewed.  Amlodipine 5 mg daily   Aspirin 81 mg daily  Losartan 100 mg daily  Hydrochlorothiazide 25 mg po daily  Pravastatin 20 mg po daily  B complex/vitamin C/multivitamin    5. ECT  ECT consult completed 3/26  Dental consult scheduled for 3pm today  Patient and  have reviewed ECT documents   Labs over weekend  COVID swab Sunday      6. Encourage individual, group, and milieu therapy.    7. Collaboration with outpatient psychiatrist Dr. Chester Schmid, [phone].    8. Pending collateral from , Kevin, [phone].    9. Dispo collaboration with social work.

## 2024-03-30 RX ADMIN — SENNA PLUS 2 TABLET(S): 8.6 TABLET ORAL at 21:25

## 2024-03-30 RX ADMIN — Medication 30 MILLIGRAM(S): at 09:05

## 2024-03-30 RX ADMIN — Medication 25 MILLIGRAM(S): at 12:53

## 2024-03-30 RX ADMIN — Medication 1 MILLIGRAM(S): at 09:04

## 2024-03-30 RX ADMIN — Medication 1 MILLIGRAM(S): at 12:53

## 2024-03-30 RX ADMIN — ATORVASTATIN CALCIUM 20 MILLIGRAM(S): 80 TABLET, FILM COATED ORAL at 21:26

## 2024-03-30 RX ADMIN — Medication 25 MILLIGRAM(S): at 21:25

## 2024-03-30 RX ADMIN — Medication 25 MILLIGRAM(S): at 09:07

## 2024-03-30 RX ADMIN — Medication 81 MILLIGRAM(S): at 09:03

## 2024-03-30 RX ADMIN — Medication 100 MILLIGRAM(S): at 21:26

## 2024-03-30 RX ADMIN — Medication 1 MILLIGRAM(S): at 21:26

## 2024-03-30 RX ADMIN — BREXPIPRAZOLE 1.5 MILLIGRAM(S): 0.25 TABLET ORAL at 09:07

## 2024-03-31 LAB
ALBUMIN SERPL ELPH-MCNC: 4.3 G/DL — SIGNIFICANT CHANGE UP (ref 3.3–5)
ALP SERPL-CCNC: 90 U/L — SIGNIFICANT CHANGE UP (ref 40–120)
ALT FLD-CCNC: 16 U/L — SIGNIFICANT CHANGE UP (ref 4–33)
ANION GAP SERPL CALC-SCNC: 11 MMOL/L — SIGNIFICANT CHANGE UP (ref 7–14)
AST SERPL-CCNC: 14 U/L — SIGNIFICANT CHANGE UP (ref 4–32)
BASOPHILS # BLD AUTO: 0.02 K/UL — SIGNIFICANT CHANGE UP (ref 0–0.2)
BASOPHILS NFR BLD AUTO: 0.4 % — SIGNIFICANT CHANGE UP (ref 0–2)
BILIRUB SERPL-MCNC: 0.4 MG/DL — SIGNIFICANT CHANGE UP (ref 0.2–1.2)
BUN SERPL-MCNC: 25 MG/DL — HIGH (ref 7–23)
CALCIUM SERPL-MCNC: 9.8 MG/DL — SIGNIFICANT CHANGE UP (ref 8.4–10.5)
CHLORIDE SERPL-SCNC: 105 MMOL/L — SIGNIFICANT CHANGE UP (ref 98–107)
CO2 SERPL-SCNC: 28 MMOL/L — SIGNIFICANT CHANGE UP (ref 22–31)
CREAT SERPL-MCNC: 1.21 MG/DL — SIGNIFICANT CHANGE UP (ref 0.5–1.3)
EGFR: 49 ML/MIN/1.73M2 — LOW
EOSINOPHIL # BLD AUTO: 0.11 K/UL — SIGNIFICANT CHANGE UP (ref 0–0.5)
EOSINOPHIL NFR BLD AUTO: 2 % — SIGNIFICANT CHANGE UP (ref 0–6)
GLUCOSE SERPL-MCNC: 78 MG/DL — SIGNIFICANT CHANGE UP (ref 70–99)
HCT VFR BLD CALC: 37 % — SIGNIFICANT CHANGE UP (ref 34.5–45)
HGB BLD-MCNC: 13 G/DL — SIGNIFICANT CHANGE UP (ref 11.5–15.5)
IANC: 3.81 K/UL — SIGNIFICANT CHANGE UP (ref 1.8–7.4)
IMM GRANULOCYTES NFR BLD AUTO: 0.2 % — SIGNIFICANT CHANGE UP (ref 0–0.9)
LYMPHOCYTES # BLD AUTO: 1.14 K/UL — SIGNIFICANT CHANGE UP (ref 1–3.3)
LYMPHOCYTES # BLD AUTO: 20.6 % — SIGNIFICANT CHANGE UP (ref 13–44)
MCHC RBC-ENTMCNC: 30.8 PG — SIGNIFICANT CHANGE UP (ref 27–34)
MCHC RBC-ENTMCNC: 35.1 GM/DL — SIGNIFICANT CHANGE UP (ref 32–36)
MCV RBC AUTO: 87.7 FL — SIGNIFICANT CHANGE UP (ref 80–100)
MONOCYTES # BLD AUTO: 0.45 K/UL — SIGNIFICANT CHANGE UP (ref 0–0.9)
MONOCYTES NFR BLD AUTO: 8.1 % — SIGNIFICANT CHANGE UP (ref 2–14)
NEUTROPHILS # BLD AUTO: 3.81 K/UL — SIGNIFICANT CHANGE UP (ref 1.8–7.4)
NEUTROPHILS NFR BLD AUTO: 68.7 % — SIGNIFICANT CHANGE UP (ref 43–77)
NRBC # BLD: 0 /100 WBCS — SIGNIFICANT CHANGE UP (ref 0–0)
NRBC # FLD: 0 K/UL — SIGNIFICANT CHANGE UP (ref 0–0)
PLATELET # BLD AUTO: 236 K/UL — SIGNIFICANT CHANGE UP (ref 150–400)
POTASSIUM SERPL-MCNC: 3.9 MMOL/L — SIGNIFICANT CHANGE UP (ref 3.5–5.3)
POTASSIUM SERPL-SCNC: 3.9 MMOL/L — SIGNIFICANT CHANGE UP (ref 3.5–5.3)
PROT SERPL-MCNC: 6.7 G/DL — SIGNIFICANT CHANGE UP (ref 6–8.3)
RBC # BLD: 4.22 M/UL — SIGNIFICANT CHANGE UP (ref 3.8–5.2)
RBC # FLD: 12.1 % — SIGNIFICANT CHANGE UP (ref 10.3–14.5)
SARS-COV-2 RNA SPEC QL NAA+PROBE: SIGNIFICANT CHANGE UP
SODIUM SERPL-SCNC: 144 MMOL/L — SIGNIFICANT CHANGE UP (ref 135–145)
WBC # BLD: 5.54 K/UL — SIGNIFICANT CHANGE UP (ref 3.8–10.5)
WBC # FLD AUTO: 5.54 K/UL — SIGNIFICANT CHANGE UP (ref 3.8–10.5)

## 2024-03-31 PROCEDURE — 93010 ELECTROCARDIOGRAM REPORT: CPT

## 2024-03-31 RX ADMIN — Medication 1 MILLIGRAM(S): at 09:25

## 2024-03-31 RX ADMIN — ATORVASTATIN CALCIUM 20 MILLIGRAM(S): 80 TABLET, FILM COATED ORAL at 20:42

## 2024-03-31 RX ADMIN — Medication 81 MILLIGRAM(S): at 09:24

## 2024-03-31 RX ADMIN — SENNA PLUS 2 TABLET(S): 8.6 TABLET ORAL at 20:42

## 2024-03-31 RX ADMIN — Medication 1 MILLIGRAM(S): at 13:04

## 2024-03-31 RX ADMIN — Medication 30 MILLIGRAM(S): at 09:25

## 2024-03-31 RX ADMIN — Medication 20 MILLIGRAM(S): at 08:30

## 2024-03-31 RX ADMIN — Medication 1 MILLIGRAM(S): at 20:42

## 2024-03-31 RX ADMIN — MAGNESIUM HYDROXIDE 30 MILLILITER(S): 400 TABLET, CHEWABLE ORAL at 09:24

## 2024-03-31 RX ADMIN — Medication 100 MILLIGRAM(S): at 20:42

## 2024-03-31 RX ADMIN — Medication 10 MILLIGRAM(S): at 17:41

## 2024-03-31 RX ADMIN — Medication 10 MILLIGRAM(S): at 13:04

## 2024-03-31 RX ADMIN — Medication 25 MILLIGRAM(S): at 09:24

## 2024-03-31 RX ADMIN — Medication 25 MILLIGRAM(S): at 13:04

## 2024-03-31 RX ADMIN — Medication 25 MILLIGRAM(S): at 20:42

## 2024-03-31 RX ADMIN — BREXPIPRAZOLE 1.5 MILLIGRAM(S): 0.25 TABLET ORAL at 09:25

## 2024-04-01 PROCEDURE — 99232 SBSQ HOSP IP/OBS MODERATE 35: CPT | Mod: FS

## 2024-04-01 RX ORDER — MIDAZOLAM HYDROCHLORIDE 1 MG/ML
2 INJECTION, SOLUTION INTRAMUSCULAR; INTRAVENOUS ONCE
Refills: 0 | Status: DISCONTINUED | OUTPATIENT
Start: 2024-04-01 | End: 2024-04-01

## 2024-04-01 RX ORDER — FLUMAZENIL 0.1 MG/ML
0.4 VIAL (ML) INTRAVENOUS ONCE
Refills: 0 | Status: COMPLETED | OUTPATIENT
Start: 2024-04-01 | End: 2024-04-01

## 2024-04-01 RX ADMIN — Medication 25 MILLIGRAM(S): at 16:45

## 2024-04-01 RX ADMIN — Medication 1 MILLIGRAM(S): at 16:44

## 2024-04-01 RX ADMIN — AMLODIPINE BESYLATE 5 MILLIGRAM(S): 2.5 TABLET ORAL at 10:12

## 2024-04-01 RX ADMIN — LOSARTAN POTASSIUM 100 MILLIGRAM(S): 100 TABLET, FILM COATED ORAL at 10:12

## 2024-04-01 RX ADMIN — Medication 10 MILLIGRAM(S): at 16:44

## 2024-04-01 RX ADMIN — Medication 1 MILLIGRAM(S): at 20:28

## 2024-04-01 RX ADMIN — Medication 81 MILLIGRAM(S): at 10:12

## 2024-04-01 RX ADMIN — BREXPIPRAZOLE 1.5 MILLIGRAM(S): 0.25 TABLET ORAL at 10:12

## 2024-04-01 RX ADMIN — Medication 25 MILLIGRAM(S): at 10:12

## 2024-04-01 RX ADMIN — ATORVASTATIN CALCIUM 20 MILLIGRAM(S): 80 TABLET, FILM COATED ORAL at 20:26

## 2024-04-01 RX ADMIN — Medication 1 MILLIGRAM(S): at 10:12

## 2024-04-01 RX ADMIN — Medication 20 MILLIGRAM(S): at 10:14

## 2024-04-01 RX ADMIN — Medication 100 MILLIGRAM(S): at 20:27

## 2024-04-01 RX ADMIN — SENNA PLUS 2 TABLET(S): 8.6 TABLET ORAL at 20:26

## 2024-04-01 RX ADMIN — Medication 25 MILLIGRAM(S): at 20:26

## 2024-04-01 RX ADMIN — Medication 30 MILLIGRAM(S): at 10:12

## 2024-04-01 NOTE — BH INPATIENT PSYCHIATRY PROGRESS NOTE - NSBHCHARTREVIEWVS_PSY_A_CORE FT
Vital Signs Last 24 Hrs  T(C): 36.4 (04-01-24 @ 07:13), Max: 36.4 (04-01-24 @ 07:13)  T(F): 97.6 (04-01-24 @ 07:13), Max: 97.6 (04-01-24 @ 07:13)  HR: --  BP: 144/72 (03-31-24 @ 20:28) (144/72 - 144/72)  BP(mean): 86 (03-31-24 @ 20:28) (86 - 86)  RR: 18 (04-01-24 @ 07:13) (18 - 18)  SpO2: 99% (04-01-24 @ 07:13) (99% - 99%)    Orthostatic VS  04-01-24 @ 07:13  Lying BP: --/-- HR: --  Sitting BP: 143/84 HR: 80  Standing BP: 133/76 HR: 91  Site: --  Mode: --  Orthostatic VS  03-31-24 @ 07:08  Lying BP: --/-- HR: --  Sitting BP: 110/65 HR: 96  Standing BP: 107/65 HR: 100  Site: --  Mode: --  Orthostatic VS  03-30-24 @ 20:28  Lying BP: --/-- HR: --  Sitting BP: 118/66 HR: 94  Standing BP: 120/65 HR: 109  Site: --  Mode: --   Vital Signs Last 24 Hrs  T(C): 36.4 (04-01-24 @ 14:23), Max: 36.8 (04-01-24 @ 00:58)  T(F): 97.6 (04-01-24 @ 14:23), Max: 98.2 (04-01-24 @ 00:58)  HR: 83 (04-01-24 @ 14:23) (83 - 83)  BP: 138/71 (04-01-24 @ 14:23) (138/71 - 144/72)  BP(mean): 86 (03-31-24 @ 20:28) (86 - 86)  RR: 18 (04-01-24 @ 14:23) (17 - 18)  SpO2: 99% (04-01-24 @ 14:23) (99% - 100%)    Orthostatic VS  04-01-24 @ 07:13  Lying BP: --/-- HR: --  Sitting BP: 143/84 HR: 80  Standing BP: 133/76 HR: 91  Site: --  Mode: --  Orthostatic VS  03-31-24 @ 07:08  Lying BP: --/-- HR: --  Sitting BP: 110/65 HR: 96  Standing BP: 107/65 HR: 100  Site: --  Mode: --  Orthostatic VS  03-30-24 @ 20:28  Lying BP: --/-- HR: --  Sitting BP: 118/66 HR: 94  Standing BP: 120/65 HR: 109  Site: --  Mode: --   Vital Signs Last 24 Hrs  T(C): 36.4 (04-01-24 @ 14:23), Max: 36.8 (04-01-24 @ 00:58)  T(F): 97.6 (04-01-24 @ 14:23), Max: 98.2 (04-01-24 @ 00:58)  HR: 83 (04-01-24 @ 14:23) (83 - 83)  BP: 138/71 (04-01-24 @ 14:23) (138/71 - 138/71)  BP(mean): --  RR: 18 (04-01-24 @ 14:23) (17 - 18)  SpO2: 99% (04-01-24 @ 14:23) (99% - 100%)    Orthostatic VS  04-01-24 @ 07:13  Lying BP: --/-- HR: --  Sitting BP: 143/84 HR: 80  Standing BP: 133/76 HR: 91  Site: --  Mode: --  Orthostatic VS  03-31-24 @ 07:08  Lying BP: --/-- HR: --  Sitting BP: 110/65 HR: 96  Standing BP: 107/65 HR: 100  Site: --  Mode: --

## 2024-04-01 NOTE — BH INPATIENT PSYCHIATRY PROGRESS NOTE - CURRENT MEDICATION
MEDICATIONS  (STANDING):  amLODIPine   Tablet 5 milliGRAM(s) Oral daily  aspirin  chewable 81 milliGRAM(s) Oral daily  atorvastatin 20 milliGRAM(s) Oral at bedtime  brexpiprazole 1.5 milliGRAM(s) Oral daily  busPIRone 10 milliGRAM(s) Oral <User Schedule>  busPIRone 20 milliGRAM(s) Oral <User Schedule>  clonazePAM  Tablet 1 milliGRAM(s) Oral three times a day  FLUoxetine 30 milliGRAM(s) Oral daily  hydrochlorothiazide 25 milliGRAM(s) Oral daily  hydrOXYzine hydrochloride 25 milliGRAM(s) Oral three times a day  losartan 100 milliGRAM(s) Oral daily  senna 2 Tablet(s) Oral at bedtime  traZODone 100 milliGRAM(s) Oral at bedtime    MEDICATIONS  (PRN):  acetaminophen     Tablet .. 650 milliGRAM(s) Oral every 6 hours PRN Mild Pain (1 - 3), Moderate Pain (4 - 6), Severe Pain (7 - 10)  aluminum hydroxide/magnesium hydroxide/simethicone Suspension 30 milliLiter(s) Oral every 6 hours PRN Dyspepsia  LORazepam     Tablet 0.5 milliGRAM(s) Oral every 6 hours PRN Anxiety  LORazepam     Tablet 1 milliGRAM(s) Oral every 6 hours PRN Severe Anxiety/Agitation  LORazepam   Injectable 2 milliGRAM(s) IntraMuscular Once PRN severe agitation  magnesium hydroxide Suspension 30 milliLiter(s) Oral daily PRN Constipation   MEDICATIONS  (STANDING):  amLODIPine   Tablet 5 milliGRAM(s) Oral daily  aspirin  chewable 81 milliGRAM(s) Oral daily  atorvastatin 20 milliGRAM(s) Oral at bedtime  brexpiprazole 1.5 milliGRAM(s) Oral daily  busPIRone 20 milliGRAM(s) Oral <User Schedule>  busPIRone 10 milliGRAM(s) Oral <User Schedule>  clonazePAM  Tablet 1 milliGRAM(s) Oral three times a day  FLUoxetine 30 milliGRAM(s) Oral daily  hydrochlorothiazide 25 milliGRAM(s) Oral daily  hydrOXYzine hydrochloride 25 milliGRAM(s) Oral three times a day  losartan 100 milliGRAM(s) Oral daily  senna 2 Tablet(s) Oral at bedtime  traZODone 100 milliGRAM(s) Oral at bedtime    MEDICATIONS  (PRN):  acetaminophen     Tablet .. 650 milliGRAM(s) Oral every 6 hours PRN Mild Pain (1 - 3), Moderate Pain (4 - 6), Severe Pain (7 - 10)  aluminum hydroxide/magnesium hydroxide/simethicone Suspension 30 milliLiter(s) Oral every 6 hours PRN Dyspepsia  LORazepam     Tablet 1 milliGRAM(s) Oral every 6 hours PRN Severe Anxiety/Agitation  LORazepam     Tablet 0.5 milliGRAM(s) Oral every 6 hours PRN Anxiety  LORazepam   Injectable 2 milliGRAM(s) IntraMuscular Once PRN severe agitation  magnesium hydroxide Suspension 30 milliLiter(s) Oral daily PRN Constipation

## 2024-04-01 NOTE — BH INPATIENT PSYCHIATRY PROGRESS NOTE - NSBHASSESSSUMMFT_PSY_ALL_CORE
69-year-old female, , no children, retired 3 years ago (was ), domiciled with  in a private residence with . PPHx of Depression and LEO, engaged in outpatient tx with Dr Schmid x20+ years and therapist Cole Romero. No previous psychiatric admissions, denies previous suicide attempts, denies NSSIB. Pt denies all substance use. Pt denies legal issues and denies hx of violence/aggression. Denies access to firearms. Pertinent PMH HLD, HTN, IBS, BONIFACIO has CPAP (has not used for months). Referred by [facility] for poor functioning and inability to care for oneself due to worsening anxiety and depression.     Patient reports symptoms of depression including dysphoric mood, diminished interest in activities, and fatigue/little energy that prevents her from completing ADLs. She reports recent history of 20 lb weight loss "months ago" and states that she gained back 10 lbs. Additionally reports severe anxiety that prevents her from leaving the house, states that she last left her home ~1 month ago. Denies AVH. Denies SI/I/P. Displays insight into need for pharmacologic treatment for depression/anxiety.    On assessment, patient appears to have flat affect, psychomotor retardation, and soft speech. Describes her mood as sad today, which is congruent with her affect. Patient NPO in anticipation of first ECT today. Denies A/V hallucinations. Denies SI/I/P. Labs over weekend notable for BUN 25 and GFR 46. Covid swab negative.     Plan:  1. Admit to [facility], [facility] legal status.    2. Routine observation with q 15 minute checks. Patient denies suicidality, remains in behavioral control.    3. Psych: continue home medication regimen pending collateral.  Prozac 30 mg daily  Klonopin 1 mg TID  Rexulti 1.5 mg daily  Buspirone 20 mg daily  Buspirone 10 mg afternoon  Hydroxyzine 25 mg TID  Trazodone 125 mg QHS  --PRN Ativan 2 mg IM for severe agitation    4. Medical: continue home medication regimen. Labs reviewed.  Amlodipine 5 mg daily   Aspirin 81 mg daily  Losartan 100 mg daily  Hydrochlorothiazide 25 mg po daily  Pravastatin 20 mg po daily  B complex/vitamin C/multivitamin    5. ECT  ECT #1 today (4/1/24)  ECT consult completed 3/26  Patient and  have reviewed ECT documents   Labs over weekend  Negative COVID swab Sunday      6. Encourage individual, group, and milieu therapy.    7. Collaboration with outpatient psychiatrist Dr. Chester Schmid, [phone].    8. Pending collateral from , Kevin, [phone].    9. Dispo collaboration with social work.

## 2024-04-01 NOTE — BH INPATIENT PSYCHIATRY PROGRESS NOTE - NSBHMETABOLIC_PSY_ALL_CORE_FT
BMI: BMI (kg/m2): 33.6 (03-22-24 @ 12:45)  HbA1c:   Glucose:   BP: 144/72 (03-31-24 @ 20:28) (104/73 - 144/72)Vital Signs Last 24 Hrs  T(C): 36.4 (04-01-24 @ 07:13), Max: 36.4 (04-01-24 @ 07:13)  T(F): 97.6 (04-01-24 @ 07:13), Max: 97.6 (04-01-24 @ 07:13)  HR: --  BP: 144/72 (03-31-24 @ 20:28) (144/72 - 144/72)  BP(mean): 86 (03-31-24 @ 20:28) (86 - 86)  RR: 18 (04-01-24 @ 07:13) (18 - 18)  SpO2: 99% (04-01-24 @ 07:13) (99% - 99%)    Orthostatic VS  04-01-24 @ 07:13  Lying BP: --/-- HR: --  Sitting BP: 143/84 HR: 80  Standing BP: 133/76 HR: 91  Site: --  Mode: --  Orthostatic VS  03-31-24 @ 07:08  Lying BP: --/-- HR: --  Sitting BP: 110/65 HR: 96  Standing BP: 107/65 HR: 100  Site: --  Mode: --  Orthostatic VS  03-30-24 @ 20:28  Lying BP: --/-- HR: --  Sitting BP: 118/66 HR: 94  Standing BP: 120/65 HR: 109  Site: --  Mode: --    Lipid Panel:  BMI: BMI (kg/m2): 33.6 (03-22-24 @ 12:45)  HbA1c:   Glucose:   BP: 138/71 (04-01-24 @ 14:23) (104/73 - 144/72)Vital Signs Last 24 Hrs  T(C): 36.4 (04-01-24 @ 14:23), Max: 36.8 (04-01-24 @ 00:58)  T(F): 97.6 (04-01-24 @ 14:23), Max: 98.2 (04-01-24 @ 00:58)  HR: 83 (04-01-24 @ 14:23) (83 - 83)  BP: 138/71 (04-01-24 @ 14:23) (138/71 - 144/72)  BP(mean): 86 (03-31-24 @ 20:28) (86 - 86)  RR: 18 (04-01-24 @ 14:23) (17 - 18)  SpO2: 99% (04-01-24 @ 14:23) (99% - 100%)    Orthostatic VS  04-01-24 @ 07:13  Lying BP: --/-- HR: --  Sitting BP: 143/84 HR: 80  Standing BP: 133/76 HR: 91  Site: --  Mode: --  Orthostatic VS  03-31-24 @ 07:08  Lying BP: --/-- HR: --  Sitting BP: 110/65 HR: 96  Standing BP: 107/65 HR: 100  Site: --  Mode: --  Orthostatic VS  03-30-24 @ 20:28  Lying BP: --/-- HR: --  Sitting BP: 118/66 HR: 94  Standing BP: 120/65 HR: 109  Site: --  Mode: --    Lipid Panel:  BMI: BMI (kg/m2): 33.6 (03-22-24 @ 12:45)  HbA1c:   Glucose:   BP: 138/71 (04-01-24 @ 14:23) (104/73 - 144/72)Vital Signs Last 24 Hrs  T(C): 36.4 (04-01-24 @ 14:23), Max: 36.8 (04-01-24 @ 00:58)  T(F): 97.6 (04-01-24 @ 14:23), Max: 98.2 (04-01-24 @ 00:58)  HR: 83 (04-01-24 @ 14:23) (83 - 83)  BP: 138/71 (04-01-24 @ 14:23) (138/71 - 138/71)  BP(mean): --  RR: 18 (04-01-24 @ 14:23) (17 - 18)  SpO2: 99% (04-01-24 @ 14:23) (99% - 100%)    Orthostatic VS  04-01-24 @ 07:13  Lying BP: --/-- HR: --  Sitting BP: 143/84 HR: 80  Standing BP: 133/76 HR: 91  Site: --  Mode: --  Orthostatic VS  03-31-24 @ 07:08  Lying BP: --/-- HR: --  Sitting BP: 110/65 HR: 96  Standing BP: 107/65 HR: 100  Site: --  Mode: --    Lipid Panel:

## 2024-04-01 NOTE — BH INPATIENT PSYCHIATRY PROGRESS NOTE - NSBHFUPINTERVALHXFT_PSY_A_CORE
Patient seen for follow up for depression and anxiety. Chart reviewed and case discussed with treatment team. Patient reports that she is nervous about going to ECT today. Discussed the process of ECT with the patient and provided reassurance. She describes her mood as "sad" and notes that she is not anxious today. She has been going to groups but does not stay until the end, notes that she prefers to be in bed. She tries to get out of bed and walk around the halls when possible. She feels that she "can't walk right." Further explained that her legs feel weaker than when she arrived to the hospital, which is making it difficult to ambulate. Sleep has improved. Says she does not want to shower today, showered last night. Denies medication side effects. Denies A/V hallucinations. Denies SI/I/P.     Labs over weekend notable for GFR 46 and BUN 25, Covid swab negative . Patient seen for follow up for depression and anxiety. Chart reviewed and case discussed with treatment team. Patient reports that she is nervous about going to ECT today. Discussed the process of ECT with the patient and provided reassurance. She describes her mood as "sad" and notes that she is not anxious today. She has been going to groups but does not stay until the end, notes that she prefers to be in bed. She tries to get out of bed and walk around the halls when possible. She feels that she "can't walk right." Further explained that her legs feel weaker than when she arrived to the hospital, which is making it difficult to ambulate. Sleep has improved. Says she does not want to shower today, showered last night. Denies medication side effects. Denies A/V hallucinations. Denies SI/I/P.     Labs over weekend notable for GFR 46 and BUN 25, Covid swab negative .    ECT treatment was canceled due to history of Retinal surgery, needs opthalmology clearance.  provided Ophthalmologist information. Called but closed for the day.

## 2024-04-02 PROCEDURE — 99232 SBSQ HOSP IP/OBS MODERATE 35: CPT | Mod: FS

## 2024-04-02 RX ADMIN — SENNA PLUS 2 TABLET(S): 8.6 TABLET ORAL at 21:36

## 2024-04-02 RX ADMIN — Medication 1 MILLIGRAM(S): at 12:19

## 2024-04-02 RX ADMIN — Medication 20 MILLIGRAM(S): at 09:05

## 2024-04-02 RX ADMIN — ATORVASTATIN CALCIUM 20 MILLIGRAM(S): 80 TABLET, FILM COATED ORAL at 21:36

## 2024-04-02 RX ADMIN — Medication 1 MILLIGRAM(S): at 09:06

## 2024-04-02 RX ADMIN — Medication 100 MILLIGRAM(S): at 21:36

## 2024-04-02 RX ADMIN — LOSARTAN POTASSIUM 100 MILLIGRAM(S): 100 TABLET, FILM COATED ORAL at 09:06

## 2024-04-02 RX ADMIN — BREXPIPRAZOLE 1.5 MILLIGRAM(S): 0.25 TABLET ORAL at 09:06

## 2024-04-02 RX ADMIN — Medication 30 MILLIGRAM(S): at 09:06

## 2024-04-02 RX ADMIN — Medication 1 MILLIGRAM(S): at 21:36

## 2024-04-02 RX ADMIN — Medication 25 MILLIGRAM(S): at 21:36

## 2024-04-02 RX ADMIN — AMLODIPINE BESYLATE 5 MILLIGRAM(S): 2.5 TABLET ORAL at 09:06

## 2024-04-02 RX ADMIN — Medication 81 MILLIGRAM(S): at 09:05

## 2024-04-02 RX ADMIN — Medication 10 MILLIGRAM(S): at 12:19

## 2024-04-02 RX ADMIN — Medication 25 MILLIGRAM(S): at 12:19

## 2024-04-02 RX ADMIN — Medication 25 MILLIGRAM(S): at 09:06

## 2024-04-02 NOTE — BH INPATIENT PSYCHIATRY PROGRESS NOTE - CURRENT MEDICATION
MEDICATIONS  (STANDING):  amLODIPine   Tablet 5 milliGRAM(s) Oral daily  aspirin  chewable 81 milliGRAM(s) Oral daily  atorvastatin 20 milliGRAM(s) Oral at bedtime  brexpiprazole 1.5 milliGRAM(s) Oral daily  busPIRone 10 milliGRAM(s) Oral <User Schedule>  busPIRone 20 milliGRAM(s) Oral <User Schedule>  clonazePAM  Tablet 1 milliGRAM(s) Oral three times a day  FLUoxetine 30 milliGRAM(s) Oral daily  hydrochlorothiazide 25 milliGRAM(s) Oral daily  hydrOXYzine hydrochloride 25 milliGRAM(s) Oral three times a day  losartan 100 milliGRAM(s) Oral daily  senna 2 Tablet(s) Oral at bedtime  traZODone 100 milliGRAM(s) Oral at bedtime    MEDICATIONS  (PRN):  acetaminophen     Tablet .. 650 milliGRAM(s) Oral every 6 hours PRN Mild Pain (1 - 3), Moderate Pain (4 - 6), Severe Pain (7 - 10)  aluminum hydroxide/magnesium hydroxide/simethicone Suspension 30 milliLiter(s) Oral every 6 hours PRN Dyspepsia  LORazepam     Tablet 0.5 milliGRAM(s) Oral every 6 hours PRN Anxiety  LORazepam     Tablet 1 milliGRAM(s) Oral every 6 hours PRN Severe Anxiety/Agitation  LORazepam   Injectable 2 milliGRAM(s) IntraMuscular Once PRN severe agitation  magnesium hydroxide Suspension 30 milliLiter(s) Oral daily PRN Constipation

## 2024-04-02 NOTE — BH INPATIENT PSYCHIATRY PROGRESS NOTE - PRN MEDS
MEDICATIONS  (PRN):  acetaminophen     Tablet .. 650 milliGRAM(s) Oral every 6 hours PRN Mild Pain (1 - 3), Moderate Pain (4 - 6), Severe Pain (7 - 10)  aluminum hydroxide/magnesium hydroxide/simethicone Suspension 30 milliLiter(s) Oral every 6 hours PRN Dyspepsia  LORazepam     Tablet 0.5 milliGRAM(s) Oral every 6 hours PRN Anxiety  LORazepam     Tablet 1 milliGRAM(s) Oral every 6 hours PRN Severe Anxiety/Agitation  LORazepam   Injectable 2 milliGRAM(s) IntraMuscular Once PRN severe agitation  magnesium hydroxide Suspension 30 milliLiter(s) Oral daily PRN Constipation

## 2024-04-02 NOTE — BH INPATIENT PSYCHIATRY PROGRESS NOTE - NSBHMETABOLIC_PSY_ALL_CORE_FT
BMI: BMI (kg/m2): 33.6 (03-22-24 @ 12:45)  HbA1c:   Glucose:   BP: 139/78 (04-01-24 @ 20:58) (138/71 - 144/72)Vital Signs Last 24 Hrs  T(C): 36.4 (04-02-24 @ 07:15), Max: 36.4 (04-02-24 @ 07:15)  T(F): 97.6 (04-02-24 @ 07:15), Max: 97.6 (04-02-24 @ 07:15)  HR: --  BP: 139/78 (04-01-24 @ 20:58) (139/78 - 139/78)  BP(mean): 82 (04-01-24 @ 20:58) (82 - 82)  RR: 18 (04-02-24 @ 07:15) (18 - 18)  SpO2: 98% (04-02-24 @ 04:09) (98% - 98%)    Orthostatic VS  04-02-24 @ 07:15  Lying BP: --/-- HR: --  Sitting BP: 104/70 HR: 91  Standing BP: 100/78 HR: 98  Site: --  Mode: --  Orthostatic VS  04-01-24 @ 07:13  Lying BP: --/-- HR: --  Sitting BP: 143/84 HR: 80  Standing BP: 133/76 HR: 91  Site: --  Mode: --    Lipid Panel:

## 2024-04-02 NOTE — BH INPATIENT PSYCHIATRY PROGRESS NOTE - NSBHFUPINTERVALHXFT_PSY_A_CORE
Patient seen for follow up for depression and anxiety. Chart reviewed and case discussed with treatment team. Patient reported "I can't function", expressed concern about unsteady gait. Discussed with patient PT evaluated her recently and did not have concerns about her needing an assistive device. Patient expressed concern unsteady gait was related to medication, reviewed medication and how we have not made drastic changes to medication because we are focusing on ECT. PT ordered to re-evaluate patient. Patient continued to spend most of the day in bed, educated about becoming deconditioned due to the lack of physical movement. Patient endorsed she had trouble at home ambulating due to deconditioning. Patient was informed clearance was obtained from ophthalmologist and ECT is scheduled for tomorrow, discussed maintaining NPO after midnight.

## 2024-04-02 NOTE — BH INPATIENT PSYCHIATRY PROGRESS NOTE - NSBHASSESSSUMMFT_PSY_ALL_CORE
69-year-old female, , no children, retired 3 years ago (was ), domiciled with  in a private residence with . PPHx of Depression and LEO, engaged in outpatient tx with Dr Schmid x20+ years and therapist Cole Romero. No previous psychiatric admissions, denies previous suicide attempts, denies NSSIB. Pt denies all substance use. Pt denies legal issues and denies hx of violence/aggression. Denies access to firearms. Pertinent PMH HLD, HTN, IBS, BONIFACIO has CPAP (has not used for months). Referred by [facility] for poor functioning and inability to care for oneself due to worsening anxiety and depression.     Patient reports symptoms of depression including dysphoric mood, diminished interest in activities, and fatigue/little energy that prevents her from completing ADLs. She reports recent history of 20 lb weight loss "months ago" and states that she gained back 10 lbs. Additionally reports severe anxiety that prevents her from leaving the house, states that she last left her home ~1 month ago. Denies AVH. Denies SI/I/P. Displays insight into need for pharmacologic treatment for depression/anxiety.    On assessment, patient appears to have flat affect, psychomotor retardation, and soft speech. Focused medications affecting her gait. Patient NPO in anticipation of first ECT tomorrow. Denies A/V hallucinations. Denies SI/I/P.     Plan:  1. Admit to [facility], 9.13 legal status.    2. Routine observation with q 15 minute checks. Patient denies suicidality, remains in behavioral control.    3. Psych: continue home medication regimen pending collateral.  Prozac 30 mg daily  Klonopin 1 mg TID  Rexulti 1.5 mg daily  Buspirone 20 mg daily  Buspirone 10 mg afternoon  Hydroxyzine 25 mg TID  Trazodone 125 mg QHS  --PRN Ativan 2 mg IM for severe agitation    4. Medical: continue home medication regimen. Labs reviewed.  Amlodipine 5 mg daily   Aspirin 81 mg daily  Losartan 100 mg daily  Hydrochlorothiazide 25 mg po daily  Pravastatin 20 mg po daily  B complex/vitamin C/multivitamin    5. ECT  ECT #1 today (4/1/24)  ECT consult completed 3/26  Patient and  have reviewed ECT documents   Labs over weekend  Negative COVID swab Sunday      6. Encourage individual, group, and milieu therapy.    7. Collaboration with outpatient psychiatrist Dr. Chester Schmid, [phone].    8. Pending collateral from , Kevin, [phone].    9. Dispo collaboration with social work.

## 2024-04-02 NOTE — BH INPATIENT PSYCHIATRY PROGRESS NOTE - NSBHCHARTREVIEWVS_PSY_A_CORE FT
Vital Signs Last 24 Hrs  T(C): 36.4 (04-02-24 @ 07:15), Max: 36.4 (04-02-24 @ 07:15)  T(F): 97.6 (04-02-24 @ 07:15), Max: 97.6 (04-02-24 @ 07:15)  HR: --  BP: 139/78 (04-01-24 @ 20:58) (139/78 - 139/78)  BP(mean): 82 (04-01-24 @ 20:58) (82 - 82)  RR: 18 (04-02-24 @ 07:15) (18 - 18)  SpO2: 98% (04-02-24 @ 04:09) (98% - 98%)    Orthostatic VS  04-02-24 @ 07:15  Lying BP: --/-- HR: --  Sitting BP: 104/70 HR: 91  Standing BP: 100/78 HR: 98  Site: --  Mode: --  Orthostatic VS  04-01-24 @ 07:13  Lying BP: --/-- HR: --  Sitting BP: 143/84 HR: 80  Standing BP: 133/76 HR: 91  Site: --  Mode: --

## 2024-04-03 PROCEDURE — 90870 ELECTROCONVULSIVE THERAPY: CPT

## 2024-04-03 PROCEDURE — 99232 SBSQ HOSP IP/OBS MODERATE 35: CPT | Mod: FS,25

## 2024-04-03 RX ORDER — FLUMAZENIL 0.1 MG/ML
0.4 VIAL (ML) INTRAVENOUS ONCE
Refills: 0 | Status: COMPLETED | OUTPATIENT
Start: 2024-04-03 | End: 2024-04-03

## 2024-04-03 RX ORDER — AMLODIPINE BESYLATE 2.5 MG/1
2.5 TABLET ORAL DAILY
Refills: 0 | Status: DISCONTINUED | OUTPATIENT
Start: 2024-04-03 | End: 2024-04-05

## 2024-04-03 RX ORDER — POLYETHYLENE GLYCOL 3350 17 G/17G
17 POWDER, FOR SOLUTION ORAL DAILY
Refills: 0 | Status: DISCONTINUED | OUTPATIENT
Start: 2024-04-03 | End: 2024-04-26

## 2024-04-03 RX ORDER — LOSARTAN POTASSIUM 100 MG/1
50 TABLET, FILM COATED ORAL DAILY
Refills: 0 | Status: DISCONTINUED | OUTPATIENT
Start: 2024-04-03 | End: 2024-04-05

## 2024-04-03 RX ADMIN — Medication 20 MILLIGRAM(S): at 09:37

## 2024-04-03 RX ADMIN — Medication 1 MILLIGRAM(S): at 09:38

## 2024-04-03 RX ADMIN — ATORVASTATIN CALCIUM 20 MILLIGRAM(S): 80 TABLET, FILM COATED ORAL at 20:38

## 2024-04-03 RX ADMIN — BREXPIPRAZOLE 1.5 MILLIGRAM(S): 0.25 TABLET ORAL at 09:37

## 2024-04-03 RX ADMIN — Medication 81 MILLIGRAM(S): at 09:38

## 2024-04-03 RX ADMIN — Medication 100 MILLIGRAM(S): at 21:52

## 2024-04-03 RX ADMIN — Medication 0.4 MILLIGRAM(S): at 13:15

## 2024-04-03 RX ADMIN — Medication 25 MILLIGRAM(S): at 20:39

## 2024-04-03 RX ADMIN — Medication 25 MILLIGRAM(S): at 09:37

## 2024-04-03 RX ADMIN — Medication 1 MILLIGRAM(S): at 20:39

## 2024-04-03 RX ADMIN — SENNA PLUS 2 TABLET(S): 8.6 TABLET ORAL at 20:38

## 2024-04-03 RX ADMIN — Medication 30 MILLIGRAM(S): at 09:37

## 2024-04-03 NOTE — BH INPATIENT PSYCHIATRY PROGRESS NOTE - NSBHFUPINTERVALHXFT_PSY_A_CORE
Patient seen for follow up for depression and anxiety. Chart reviewed and case discussed with treatment team. Patient had ECT #1, maintained NPO. Patient returned from ECT tired and was in bed the rest of the day. Patient reported mood as "same", not a lot of anxiety today. Patient refused to engage in interview. Patient reported continued constipation since admission, has been taking Senna, added Miralax. Patient denied SIIP. Sleep and appetite are good.

## 2024-04-03 NOTE — BH INPATIENT PSYCHIATRY PROGRESS NOTE - CURRENT MEDICATION
MEDICATIONS  (STANDING):  amLODIPine   Tablet 2.5 milliGRAM(s) Oral daily  aspirin  chewable 81 milliGRAM(s) Oral daily  atorvastatin 20 milliGRAM(s) Oral at bedtime  brexpiprazole 1.5 milliGRAM(s) Oral daily  busPIRone 20 milliGRAM(s) Oral <User Schedule>  busPIRone 10 milliGRAM(s) Oral <User Schedule>  clonazePAM  Tablet 1 milliGRAM(s) Oral three times a day  FLUoxetine 30 milliGRAM(s) Oral daily  hydrochlorothiazide 25 milliGRAM(s) Oral daily  hydrOXYzine hydrochloride 25 milliGRAM(s) Oral three times a day  losartan 50 milliGRAM(s) Oral daily  polyethylene glycol 3350 17 Gram(s) Oral daily  senna 2 Tablet(s) Oral at bedtime  traZODone 100 milliGRAM(s) Oral at bedtime    MEDICATIONS  (PRN):  acetaminophen     Tablet .. 650 milliGRAM(s) Oral every 6 hours PRN Mild Pain (1 - 3), Moderate Pain (4 - 6), Severe Pain (7 - 10)  aluminum hydroxide/magnesium hydroxide/simethicone Suspension 30 milliLiter(s) Oral every 6 hours PRN Dyspepsia  LORazepam     Tablet 0.5 milliGRAM(s) Oral every 6 hours PRN Anxiety  LORazepam     Tablet 1 milliGRAM(s) Oral every 6 hours PRN Severe Anxiety/Agitation  LORazepam   Injectable 2 milliGRAM(s) IntraMuscular Once PRN severe agitation  magnesium hydroxide Suspension 30 milliLiter(s) Oral daily PRN Constipation   MEDICATIONS  (STANDING):  amLODIPine   Tablet 2.5 milliGRAM(s) Oral daily  aspirin  chewable 81 milliGRAM(s) Oral daily  atorvastatin 20 milliGRAM(s) Oral at bedtime  brexpiprazole 1.5 milliGRAM(s) Oral daily  busPIRone 20 milliGRAM(s) Oral <User Schedule>  busPIRone 10 milliGRAM(s) Oral <User Schedule>  clonazePAM  Tablet 1 milliGRAM(s) Oral three times a day  FLUoxetine 30 milliGRAM(s) Oral daily  hydrochlorothiazide 25 milliGRAM(s) Oral daily  hydrOXYzine hydrochloride 25 milliGRAM(s) Oral three times a day  losartan 50 milliGRAM(s) Oral daily  polyethylene glycol 3350 17 Gram(s) Oral daily  senna 2 Tablet(s) Oral at bedtime  traZODone 100 milliGRAM(s) Oral at bedtime    MEDICATIONS  (PRN):  acetaminophen     Tablet .. 650 milliGRAM(s) Oral every 6 hours PRN Mild Pain (1 - 3), Moderate Pain (4 - 6), Severe Pain (7 - 10)  aluminum hydroxide/magnesium hydroxide/simethicone Suspension 30 milliLiter(s) Oral every 6 hours PRN Dyspepsia  LORazepam     Tablet 1 milliGRAM(s) Oral every 6 hours PRN Severe Anxiety/Agitation  LORazepam     Tablet 0.5 milliGRAM(s) Oral every 6 hours PRN Anxiety  LORazepam   Injectable 2 milliGRAM(s) IntraMuscular Once PRN severe agitation  magnesium hydroxide Suspension 30 milliLiter(s) Oral daily PRN Constipation

## 2024-04-03 NOTE — BH INPATIENT PSYCHIATRY PROGRESS NOTE - NSBHASSESSSUMMFT_PSY_ALL_CORE
69-year-old female, , no children, retired 3 years ago (was ), domiciled with  in a private residence with . PPHx of Depression and LEO, engaged in outpatient tx with Dr Schmid x20+ years and therapist Cole Romero. No previous psychiatric admissions, denies previous suicide attempts, denies NSSIB. Pt denies all substance use. Pt denies legal issues and denies hx of violence/aggression. Denies access to firearms. Pertinent PMH HLD, HTN, IBS, BONIFACIO has CPAP (has not used for months). Referred by [facility] for poor functioning and inability to care for oneself due to worsening anxiety and depression.     Patient reports symptoms of depression including dysphoric mood, diminished interest in activities, and fatigue/little energy that prevents her from completing ADLs. She reports recent history of 20 lb weight loss "months ago" and states that she gained back 10 lbs. Additionally reports severe anxiety that prevents her from leaving the house, states that she last left her home ~1 month ago. Denies AVH. Denies SI/I/P. Displays insight into need for pharmacologic treatment for depression/anxiety.    On assessment, patient appears to have flat affect, psychomotor retardation, and soft speech. Patient NPO in anticipation of first ECT tomorrow. Denies A/V hallucinations. Denies SI/I/P.     Plan:  1. Admit to [facility] [place], 9.13 legal status.    2. Routine observation with q 15 minute checks. Patient denies suicidality, remains in behavioral control.    3. Psych: continue home medication regimen pending collateral.  Prozac 30 mg daily  Klonopin 1 mg TID  Rexulti 1.5 mg daily  Buspirone 20 mg daily  Buspirone 10 mg afternoon  Hydroxyzine 25 mg TID  Trazodone 125 mg QHS  --PRN Ativan 2 mg IM for severe agitation    4. Medical: continue home medication regimen. Labs reviewed.  Amlodipine 5 mg daily   Aspirin 81 mg daily  Losartan 100 mg daily  Hydrochlorothiazide 25 mg po daily  Pravastatin 20 mg po daily  B complex/vitamin C/multivitamin    5. ECT  ECT #1 today (4/1/24)  ECT consult completed 3/26  Patient and  have reviewed ECT documents   Labs over weekend  Negative COVID swab Sunday      6. Encourage individual, group, and milieu therapy.    7. Collaboration with outpatient psychiatrist Dr. Chester Schmid, [phone].    8. Pending collateral from , Kevin, [phone].    9. Dispo collaboration with social work.

## 2024-04-03 NOTE — BH INPATIENT PSYCHIATRY PROGRESS NOTE - NSBHMETABOLIC_PSY_ALL_CORE_FT
BMI: BMI (kg/m2): 33.6 (03-22-24 @ 12:45)  HbA1c:   Glucose:   BP: 134/57 (04-03-24 @ 14:05) (102/79 - 144/72)Vital Signs Last 24 Hrs  T(C): 36.2 (04-03-24 @ 14:38), Max: 36.9 (04-02-24 @ 20:26)  T(F): 97.1 (04-03-24 @ 14:38), Max: 98.5 (04-02-24 @ 20:26)  HR: 88 (04-03-24 @ 14:05) (86 - 102)  BP: 134/57 (04-03-24 @ 14:05) (102/79 - 141/74)  BP(mean): 93 (04-02-24 @ 20:26) (93 - 93)  RR: 18 (04-03-24 @ 14:38) (15 - 20)  SpO2: 98% (04-03-24 @ 14:05) (96% - 100%)    Orthostatic VS  04-03-24 @ 14:38  Lying BP: --/-- HR: --  Sitting BP: 153/78 HR: 98  Standing BP: 162/71 HR: 90  Site: --  Mode: --  Orthostatic VS  04-03-24 @ 07:21  Lying BP: --/-- HR: --  Sitting BP: 118/73 HR: 84  Standing BP: 105/71 HR: 96  Site: --  Mode: --  Orthostatic VS  04-02-24 @ 07:15  Lying BP: --/-- HR: --  Sitting BP: 104/70 HR: 91  Standing BP: 100/78 HR: 98  Site: --  Mode: --    Lipid Panel:

## 2024-04-03 NOTE — BH INPATIENT PSYCHIATRY PROGRESS NOTE - NSBHCHARTREVIEWVS_PSY_A_CORE FT
Vital Signs Last 24 Hrs  T(C): 36.2 (04-03-24 @ 14:38), Max: 36.9 (04-02-24 @ 20:26)  T(F): 97.1 (04-03-24 @ 14:38), Max: 98.5 (04-02-24 @ 20:26)  HR: 88 (04-03-24 @ 14:05) (86 - 102)  BP: 134/57 (04-03-24 @ 14:05) (102/79 - 141/74)  BP(mean): 93 (04-02-24 @ 20:26) (93 - 93)  RR: 18 (04-03-24 @ 14:38) (15 - 20)  SpO2: 98% (04-03-24 @ 14:05) (96% - 100%)    Orthostatic VS  04-03-24 @ 14:38  Lying BP: --/-- HR: --  Sitting BP: 153/78 HR: 98  Standing BP: 162/71 HR: 90  Site: --  Mode: --  Orthostatic VS  04-03-24 @ 07:21  Lying BP: --/-- HR: --  Sitting BP: 118/73 HR: 84  Standing BP: 105/71 HR: 96  Site: --  Mode: --  Orthostatic VS  04-02-24 @ 07:15  Lying BP: --/-- HR: --  Sitting BP: 104/70 HR: 91  Standing BP: 100/78 HR: 98  Site: --  Mode: --

## 2024-04-04 PROCEDURE — 99232 SBSQ HOSP IP/OBS MODERATE 35: CPT

## 2024-04-04 RX ORDER — FLUOXETINE HCL 10 MG
40 CAPSULE ORAL DAILY
Refills: 0 | Status: DISCONTINUED | OUTPATIENT
Start: 2024-04-04 | End: 2024-04-26

## 2024-04-04 RX ADMIN — Medication 25 MILLIGRAM(S): at 09:45

## 2024-04-04 RX ADMIN — Medication 10 MILLIGRAM(S): at 21:21

## 2024-04-04 RX ADMIN — Medication 81 MILLIGRAM(S): at 09:45

## 2024-04-04 RX ADMIN — Medication 25 MILLIGRAM(S): at 13:05

## 2024-04-04 RX ADMIN — ATORVASTATIN CALCIUM 20 MILLIGRAM(S): 80 TABLET, FILM COATED ORAL at 21:20

## 2024-04-04 RX ADMIN — Medication 10 MILLIGRAM(S): at 13:06

## 2024-04-04 RX ADMIN — Medication 5 MILLIGRAM(S): at 21:51

## 2024-04-04 RX ADMIN — POLYETHYLENE GLYCOL 3350 17 GRAM(S): 17 POWDER, FOR SOLUTION ORAL at 09:43

## 2024-04-04 RX ADMIN — Medication 1 MILLIGRAM(S): at 13:05

## 2024-04-04 RX ADMIN — SENNA PLUS 2 TABLET(S): 8.6 TABLET ORAL at 21:20

## 2024-04-04 RX ADMIN — Medication 100 MILLIGRAM(S): at 21:20

## 2024-04-04 RX ADMIN — BREXPIPRAZOLE 1.5 MILLIGRAM(S): 0.25 TABLET ORAL at 09:44

## 2024-04-04 RX ADMIN — Medication 1 MILLIGRAM(S): at 09:45

## 2024-04-04 RX ADMIN — Medication 30 MILLIGRAM(S): at 09:43

## 2024-04-04 RX ADMIN — Medication 20 MILLIGRAM(S): at 09:44

## 2024-04-04 NOTE — PHYSICAL THERAPY INITIAL EVALUATION ADULT - IMPAIRMENTS FOUND, PT EVAL
aerobic capacity/endurance/gait, locomotion, and balance/muscle strength/poor safety awareness
gait, locomotion, and balance/muscle strength/poor safety awareness

## 2024-04-04 NOTE — BH INPATIENT PSYCHIATRY PROGRESS NOTE - CURRENT MEDICATION
MEDICATIONS  (STANDING):  amLODIPine   Tablet 2.5 milliGRAM(s) Oral daily  aspirin  chewable 81 milliGRAM(s) Oral daily  atorvastatin 20 milliGRAM(s) Oral at bedtime  brexpiprazole 1.5 milliGRAM(s) Oral daily  busPIRone 10 milliGRAM(s) Oral <User Schedule>  busPIRone 20 milliGRAM(s) Oral <User Schedule>  clonazePAM  Tablet 1 milliGRAM(s) Oral three times a day  FLUoxetine 30 milliGRAM(s) Oral daily  hydrochlorothiazide 25 milliGRAM(s) Oral daily  hydrOXYzine hydrochloride 25 milliGRAM(s) Oral three times a day  losartan 50 milliGRAM(s) Oral daily  polyethylene glycol 3350 17 Gram(s) Oral daily  senna 2 Tablet(s) Oral at bedtime  traZODone 100 milliGRAM(s) Oral at bedtime    MEDICATIONS  (PRN):  acetaminophen     Tablet .. 650 milliGRAM(s) Oral every 6 hours PRN Mild Pain (1 - 3), Moderate Pain (4 - 6), Severe Pain (7 - 10)  aluminum hydroxide/magnesium hydroxide/simethicone Suspension 30 milliLiter(s) Oral every 6 hours PRN Dyspepsia  LORazepam     Tablet 0.5 milliGRAM(s) Oral every 6 hours PRN Anxiety  LORazepam     Tablet 1 milliGRAM(s) Oral every 6 hours PRN Severe Anxiety/Agitation  LORazepam   Injectable 2 milliGRAM(s) IntraMuscular Once PRN severe agitation  magnesium hydroxide Suspension 30 milliLiter(s) Oral daily PRN Constipation   MEDICATIONS  (STANDING):  amLODIPine   Tablet 2.5 milliGRAM(s) Oral daily  aspirin  chewable 81 milliGRAM(s) Oral daily  atorvastatin 20 milliGRAM(s) Oral at bedtime  brexpiprazole 1.5 milliGRAM(s) Oral daily  busPIRone 10 milliGRAM(s) Oral two times a day  FLUoxetine 40 milliGRAM(s) Oral daily  hydrochlorothiazide 25 milliGRAM(s) Oral daily  losartan 50 milliGRAM(s) Oral daily  polyethylene glycol 3350 17 Gram(s) Oral daily  senna 2 Tablet(s) Oral at bedtime  traZODone 100 milliGRAM(s) Oral at bedtime    MEDICATIONS  (PRN):  acetaminophen     Tablet .. 650 milliGRAM(s) Oral every 6 hours PRN Mild Pain (1 - 3), Moderate Pain (4 - 6), Severe Pain (7 - 10)  aluminum hydroxide/magnesium hydroxide/simethicone Suspension 30 milliLiter(s) Oral every 6 hours PRN Dyspepsia  LORazepam     Tablet 0.5 milliGRAM(s) Oral every 6 hours PRN Anxiety  LORazepam     Tablet 1 milliGRAM(s) Oral every 6 hours PRN Severe Anxiety/Agitation  LORazepam   Injectable 2 milliGRAM(s) IntraMuscular Once PRN severe agitation  magnesium hydroxide Suspension 30 milliLiter(s) Oral daily PRN Constipation

## 2024-04-04 NOTE — BH INPATIENT PSYCHIATRY PROGRESS NOTE - NSBHASSESSSUMMFT_PSY_ALL_CORE
69-year-old female, , no children, retired 3 years ago (was ), domiciled with  in a private residence with . PPHx of Depression and LEO, engaged in outpatient tx with Dr Schmid x20+ years and therapist Cole Romero. No previous psychiatric admissions, denies previous suicide attempts, denies NSSIB. Pt denies all substance use. Pt denies legal issues and denies hx of violence/aggression. Denies access to firearms. Pertinent PMH HLD, HTN, IBS, BONIFACIO has CPAP (has not used for months). Referred by [facility] for poor functioning and inability to care for oneself due to worsening anxiety and depression.     Patient reports symptoms of depression including dysphoric mood, diminished interest in activities, and fatigue/little energy that prevents her from completing ADLs. She reports recent history of 20 lb weight loss "months ago" and states that she gained back 10 lbs. Additionally reports severe anxiety that prevents her from leaving the house, states that she last left her home ~1 month ago. Denies AVH. Denies SI/I/P. Displays insight into need for pharmacologic treatment for depression/anxiety.    On assessment, patient appears slightly brighter. Received ECT #1 yesterday, plan for 2nd session on Friday. Denies A/V hallucinations. Denies SI/I/P.     Plan:  1. Admit to [facility], 9.13 legal status.    2. Routine observation with q 15 minute checks. Patient denies suicidality, remains in behavioral control.    3. Psych: continue home medication regimen pending collateral.  Prozac 30 mg daily  Klonopin 1 mg TID  Rexulti 1.5 mg daily  Buspirone 20 mg daily  Buspirone 10 mg afternoon  Hydroxyzine 25 mg TID  Trazodone 125 mg QHS  --PRN Ativan 2 mg IM for severe agitation    4. Medical: continue home medication regimen. Labs reviewed.  Amlodipine 5 mg daily   Aspirin 81 mg daily  Losartan 100 mg daily  Hydrochlorothiazide 25 mg po daily  Pravastatin 20 mg po daily  B complex/vitamin C/multivitamin    5. ECT  ECT #2 tomorrow (4/5/24)  ECT consult completed 3/26  Patient and  have reviewed ECT documents   Labs over weekend  Negative COVID swab Sunday      6. Encourage individual, group, and milieu therapy.    7. Collaboration with outpatient psychiatrist Dr. Chester Schmid, [phone].    8. Pending collateral from , Kevin, [phone].    9. Dispo collaboration with social work. 69-year-old female, , no children, retired 3 years ago (was ), domiciled with  in a private residence with . PPHx of Depression and LEO, engaged in outpatient tx with Dr Schmid x20+ years and therapist Cole Romero. No previous psychiatric admissions, denies previous suicide attempts, denies NSSIB. Pt denies all substance use. Pt denies legal issues and denies hx of violence/aggression. Denies access to firearms. Pertinent PMH HLD, HTN, IBS, BONIFACIO has CPAP (has not used for months). Referred by [facility] for poor functioning and inability to care for oneself due to worsening anxiety and depression.     Patient reports symptoms of depression including dysphoric mood, diminished interest in activities, and fatigue/little energy that prevents her from completing ADLs. She reports recent history of 20 lb weight loss "months ago" and states that she gained back 10 lbs. Additionally reports severe anxiety that prevents her from leaving the house, states that she last left her home ~1 month ago. Denies AVH. Denies SI/I/P. Displays insight into need for pharmacologic treatment for depression/anxiety.    On assessment, patient appears slightly brighter. Received ECT #1 yesterday, plan for 2nd session on Friday. Denies A/V hallucinations. Denies SI/I/P.     Plan:  1. Admit to [facility], 9.13 legal status.    2. Routine observation with q 15 minute checks. Patient denies suicidality, remains in behavioral control.    3. Psych: continue home medication regimen pending collateral.  Prozac 40 mg daily  Rexulti 1.5 mg daily  Buspirone 10 mg BID  Trazodone 100 mg QHS  D/C Klonopin  D/C Hydroxyzine  --PRN Ativan 2 mg IM for severe agitation    4. Medical: continue home medication regimen. Labs reviewed.  Amlodipine 2.5 mg daily   Aspirin 81 mg daily  Losartan 50 mg daily  Hydrochlorothiazide 25 mg po daily  Pravastatin 20 mg po daily  B complex/vitamin C/multivitamin    5. ECT  ECT #2 tomorrow (4/5/24)  ECT consult completed 3/26  Patient and  have reviewed ECT documents   Labs over weekend  Negative COVID swab Sunday      6. Encourage individual, group, and milieu therapy.    7. Collaboration with outpatient psychiatrist Dr. Chester Schmid, [phone].    8. Pending collateral from , Kevin, [phone].    9. Dispo collaboration with social work.

## 2024-04-04 NOTE — PHYSICAL THERAPY INITIAL EVALUATION ADULT - GENERAL OBSERVATIONS, REHAB EVAL
Patient received in her room in no apparent distress.
Patient received in her room in no apparent distress.

## 2024-04-04 NOTE — BH INPATIENT PSYCHIATRY PROGRESS NOTE - NSBHFUPINTERVALHXFT_PSY_A_CORE
Patient seen for follow up for depression and anxiety. Chart reviewed and case discussed with treatment team. Patient had ECT #1 yesterday. Reports mood is "not that good" as she thought ECT would help more. However, her affect seemed brighter. She does endorse an improvement in her mood today as compared to yesterday. She is still spending most of the day in bed and not attending group sessions do to desire to be in bed. She is amenable to reducing anxiolytic dosages in order to mitigate these symptoms. Continues to endorse constipation despite miralax and senna, last BM Tuesday. Sleep and appetite good. Denies A/V H, denies SI/I/P.  Patient seen for follow up for depression and anxiety. Chart reviewed and case discussed with treatment team. Patient had ECT #1 yesterday. Reports mood is "not that good" as she thought ECT would help more. However, her affect seemed brighter. She does endorse an improvement in her mood today as compared to yesterday. She is still spending most of the day in bed and not attending group sessions due to desire to be in bed. She is amenable to reducing anxiolytic medications in order to mitigate these symptoms. Continues to endorse constipation despite Miralax and senna, last BM Tuesday. Sleep and appetite good. Denies AVH, denies SI/I/P.

## 2024-04-04 NOTE — BH INPATIENT PSYCHIATRY PROGRESS NOTE - NSBHCHARTREVIEWVS_PSY_A_CORE FT
Vital Signs Last 24 Hrs  T(C): 36.9 (04-04-24 @ 07:32), Max: 36.9 (04-04-24 @ 07:32)  T(F): 98.4 (04-04-24 @ 07:32), Max: 98.4 (04-04-24 @ 07:32)  HR: 99 (04-03-24 @ 21:08) (99 - 99)  BP: 124/76 (04-03-24 @ 21:08) (124/76 - 124/76)  BP(mean): --  RR: 16 (04-04-24 @ 07:32) (16 - 17)  SpO2: 98% (04-04-24 @ 06:17) (98% - 98%)    Orthostatic VS  04-04-24 @ 07:32  Lying BP: --/-- HR: --  Sitting BP: 111/68 HR: 92  Standing BP: 109/69 HR: 106  Site: --  Mode: --  Orthostatic VS  04-03-24 @ 14:38  Lying BP: --/-- HR: --  Sitting BP: 153/78 HR: 98  Standing BP: 162/71 HR: 90  Site: --  Mode: --  Orthostatic VS  04-03-24 @ 07:21  Lying BP: --/-- HR: --  Sitting BP: 118/73 HR: 84  Standing BP: 105/71 HR: 96  Site: --  Mode: --   Vital Signs Last 24 Hrs  T(C): 36.8 (04-04-24 @ 19:46), Max: 36.9 (04-04-24 @ 07:32)  T(F): 98.3 (04-04-24 @ 19:46), Max: 98.4 (04-04-24 @ 07:32)  HR: 99 (04-03-24 @ 21:08) (99 - 99)  BP: 133/69 (04-04-24 @ 19:46) (124/76 - 133/69)  BP(mean): 86 (04-04-24 @ 19:46) (86 - 86)  RR: 16 (04-04-24 @ 07:32) (16 - 17)  SpO2: 98% (04-04-24 @ 06:17) (98% - 98%)    Orthostatic VS  04-04-24 @ 07:32  Lying BP: --/-- HR: --  Sitting BP: 111/68 HR: 92  Standing BP: 109/69 HR: 106  Site: --  Mode: --  Orthostatic VS  04-03-24 @ 14:38  Lying BP: --/-- HR: --  Sitting BP: 153/78 HR: 98  Standing BP: 162/71 HR: 90  Site: --  Mode: --  Orthostatic VS  04-03-24 @ 07:21  Lying BP: --/-- HR: --  Sitting BP: 118/73 HR: 84  Standing BP: 105/71 HR: 96  Site: --  Mode: --

## 2024-04-04 NOTE — PHYSICAL THERAPY INITIAL EVALUATION ADULT - RANGE OF MOTION EXAMINATION, REHAB EVAL
bilateral lower extremity ROM was WFL (within functional limits)
bilateral lower extremity ROM was WFL (within functional limits)

## 2024-04-04 NOTE — PHYSICAL THERAPY INITIAL EVALUATION ADULT - PERTINENT HX OF CURRENT PROBLEM, REHAB EVAL
Patient is a 69 year old female, , no children, retired 3 years ago (was ) and domiciled with  in a private residence. PPH: Hx of Depression and LEO, engaged in outpatient tx with Dr Schmid x20+ years. No previous psychiatric admissions, denies previous suicide attempts, denies NSSIB. PMH: HLD, HTN, IBS, BONIFACIO has CPAP, Pt denies substance/ETOH use. Pt denies legal issues and denies hx of violence/aggression. Denies access to firearms, referred by [facility] for poor functioning due to increased anxiety and depression. Patient referred to Physical therapy secondary to unsteady gait
Patient is a 69 year old female, , no children, retired 3 years ago (was ) and domiciled with  in a private residence. PPH: Hx of Depression and LEO, engaged in outpatient tx with Dr Schmid x20+ years. No previous psychiatric admissions, denies previous suicide attempts, denies NSSIB. PMH: HLD, HTN, IBS, BONIFACIO has CPAP, Pt denies substance/ETOH use. Pt denies legal issues and denies hx of violence/aggression. Denies access to firearms, referred by [facility] for poor functioning due to increased anxiety and depression. Patient referred to Physical therapy secondary to unsteady gait.

## 2024-04-04 NOTE — PHYSICAL THERAPY INITIAL EVALUATION ADULT - GAIT DEVIATIONS NOTED, PT EVAL
increased time in double stance/decreased step length/decreased weight-shifting ability
decreased step length/decreased stride length

## 2024-04-04 NOTE — BH INPATIENT PSYCHIATRY PROGRESS NOTE - OTHER
psychomotor retardation  patient appears depressed but slightly brighter from yesterday, smiling and laughing at times "not that great", endorses improvement in mood Patient walks slowly, no falls, has a walker

## 2024-04-04 NOTE — PHYSICAL THERAPY INITIAL EVALUATION ADULT - PLANNED THERAPY INTERVENTIONS, PT EVAL
balance training/gait training/neuromuscular re-education/postural re-education/strengthening
balance training/gait training/neuromuscular re-education/postural re-education/transfer training

## 2024-04-04 NOTE — BH INPATIENT PSYCHIATRY PROGRESS NOTE - NSBHMETABOLIC_PSY_ALL_CORE_FT
BMI: BMI (kg/m2): 33.6 (03-22-24 @ 12:45)  HbA1c:   Glucose:   BP: 124/76 (04-03-24 @ 21:08) (102/79 - 141/74)Vital Signs Last 24 Hrs  T(C): 36.9 (04-04-24 @ 07:32), Max: 36.9 (04-04-24 @ 07:32)  T(F): 98.4 (04-04-24 @ 07:32), Max: 98.4 (04-04-24 @ 07:32)  HR: 99 (04-03-24 @ 21:08) (99 - 99)  BP: 124/76 (04-03-24 @ 21:08) (124/76 - 124/76)  BP(mean): --  RR: 16 (04-04-24 @ 07:32) (16 - 17)  SpO2: 98% (04-04-24 @ 06:17) (98% - 98%)    Orthostatic VS  04-04-24 @ 07:32  Lying BP: --/-- HR: --  Sitting BP: 111/68 HR: 92  Standing BP: 109/69 HR: 106  Site: --  Mode: --  Orthostatic VS  04-03-24 @ 14:38  Lying BP: --/-- HR: --  Sitting BP: 153/78 HR: 98  Standing BP: 162/71 HR: 90  Site: --  Mode: --  Orthostatic VS  04-03-24 @ 07:21  Lying BP: --/-- HR: --  Sitting BP: 118/73 HR: 84  Standing BP: 105/71 HR: 96  Site: --  Mode: --    Lipid Panel:  BMI: BMI (kg/m2): 33.6 (03-22-24 @ 12:45)  HbA1c:   Glucose:   BP: 133/69 (04-04-24 @ 19:46) (102/79 - 141/74)Vital Signs Last 24 Hrs  T(C): 36.8 (04-04-24 @ 19:46), Max: 36.9 (04-04-24 @ 07:32)  T(F): 98.3 (04-04-24 @ 19:46), Max: 98.4 (04-04-24 @ 07:32)  HR: 99 (04-03-24 @ 21:08) (99 - 99)  BP: 133/69 (04-04-24 @ 19:46) (124/76 - 133/69)  BP(mean): 86 (04-04-24 @ 19:46) (86 - 86)  RR: 16 (04-04-24 @ 07:32) (16 - 17)  SpO2: 98% (04-04-24 @ 06:17) (98% - 98%)    Orthostatic VS  04-04-24 @ 07:32  Lying BP: --/-- HR: --  Sitting BP: 111/68 HR: 92  Standing BP: 109/69 HR: 106  Site: --  Mode: --  Orthostatic VS  04-03-24 @ 14:38  Lying BP: --/-- HR: --  Sitting BP: 153/78 HR: 98  Standing BP: 162/71 HR: 90  Site: --  Mode: --  Orthostatic VS  04-03-24 @ 07:21  Lying BP: --/-- HR: --  Sitting BP: 118/73 HR: 84  Standing BP: 105/71 HR: 96  Site: --  Mode: --    Lipid Panel:

## 2024-04-04 NOTE — PHYSICAL THERAPY INITIAL EVALUATION ADULT - CRITERIA FOR SKILLED THERAPEUTIC INTERVENTIONS
impairments found/risk reduction/prevention/anticipated discharge recommendation
risk reduction/prevention

## 2024-04-05 PROCEDURE — 99232 SBSQ HOSP IP/OBS MODERATE 35: CPT | Mod: 25

## 2024-04-05 PROCEDURE — 90870 ELECTROCONVULSIVE THERAPY: CPT

## 2024-04-05 RX ORDER — LOSARTAN POTASSIUM 100 MG/1
50 TABLET, FILM COATED ORAL DAILY
Refills: 0 | Status: DISCONTINUED | OUTPATIENT
Start: 2024-04-05 | End: 2024-04-26

## 2024-04-05 RX ORDER — FLUMAZENIL 0.1 MG/ML
0.4 VIAL (ML) INTRAVENOUS ONCE
Refills: 0 | Status: COMPLETED | OUTPATIENT
Start: 2024-04-05 | End: 2024-04-05

## 2024-04-05 RX ORDER — AMLODIPINE BESYLATE 2.5 MG/1
2.5 TABLET ORAL DAILY
Refills: 0 | Status: DISCONTINUED | OUTPATIENT
Start: 2024-04-05 | End: 2024-04-26

## 2024-04-05 RX ADMIN — SENNA PLUS 2 TABLET(S): 8.6 TABLET ORAL at 21:59

## 2024-04-05 RX ADMIN — Medication 100 MILLIGRAM(S): at 22:00

## 2024-04-05 RX ADMIN — Medication 10 MILLIGRAM(S): at 22:00

## 2024-04-05 RX ADMIN — Medication 650 MILLIGRAM(S): at 17:33

## 2024-04-05 RX ADMIN — Medication 10 MILLIGRAM(S): at 09:45

## 2024-04-05 RX ADMIN — BREXPIPRAZOLE 1.5 MILLIGRAM(S): 0.25 TABLET ORAL at 09:44

## 2024-04-05 RX ADMIN — Medication 0.4 MILLIGRAM(S): at 15:05

## 2024-04-05 RX ADMIN — AMLODIPINE BESYLATE 2.5 MILLIGRAM(S): 2.5 TABLET ORAL at 09:45

## 2024-04-05 RX ADMIN — LOSARTAN POTASSIUM 50 MILLIGRAM(S): 100 TABLET, FILM COATED ORAL at 09:45

## 2024-04-05 RX ADMIN — Medication 40 MILLIGRAM(S): at 09:45

## 2024-04-05 RX ADMIN — Medication 5 MILLIGRAM(S): at 22:00

## 2024-04-05 RX ADMIN — ATORVASTATIN CALCIUM 20 MILLIGRAM(S): 80 TABLET, FILM COATED ORAL at 21:59

## 2024-04-05 RX ADMIN — Medication 81 MILLIGRAM(S): at 09:45

## 2024-04-05 NOTE — BH INPATIENT PSYCHIATRY PROGRESS NOTE - CURRENT MEDICATION
MEDICATIONS  (STANDING):  amLODIPine   Tablet 2.5 milliGRAM(s) Oral daily  aspirin  chewable 81 milliGRAM(s) Oral daily  atorvastatin 20 milliGRAM(s) Oral at bedtime  brexpiprazole 1.5 milliGRAM(s) Oral daily  busPIRone 10 milliGRAM(s) Oral two times a day  FLUoxetine 40 milliGRAM(s) Oral daily  hydrochlorothiazide 25 milliGRAM(s) Oral daily  losartan 50 milliGRAM(s) Oral daily  polyethylene glycol 3350 17 Gram(s) Oral daily  senna 2 Tablet(s) Oral at bedtime  traZODone 100 milliGRAM(s) Oral at bedtime    MEDICATIONS  (PRN):  acetaminophen     Tablet .. 650 milliGRAM(s) Oral every 6 hours PRN Mild Pain (1 - 3), Moderate Pain (4 - 6), Severe Pain (7 - 10)  aluminum hydroxide/magnesium hydroxide/simethicone Suspension 30 milliLiter(s) Oral every 6 hours PRN Dyspepsia  bisacodyl 5 milliGRAM(s) Oral at bedtime PRN Constipation  LORazepam     Tablet 0.5 milliGRAM(s) Oral every 6 hours PRN Anxiety  LORazepam     Tablet 1 milliGRAM(s) Oral every 6 hours PRN Severe Anxiety/Agitation  LORazepam   Injectable 2 milliGRAM(s) IntraMuscular Once PRN severe agitation  magnesium hydroxide Suspension 30 milliLiter(s) Oral daily PRN Constipation   MEDICATIONS  (STANDING):  amLODIPine   Tablet 2.5 milliGRAM(s) Oral daily  aspirin  chewable 81 milliGRAM(s) Oral daily  atorvastatin 20 milliGRAM(s) Oral at bedtime  brexpiprazole 1.5 milliGRAM(s) Oral daily  busPIRone 10 milliGRAM(s) Oral two times a day  FLUoxetine 40 milliGRAM(s) Oral daily  hydrochlorothiazide 25 milliGRAM(s) Oral daily  losartan 50 milliGRAM(s) Oral daily  polyethylene glycol 3350 17 Gram(s) Oral daily  senna 2 Tablet(s) Oral at bedtime  traZODone 100 milliGRAM(s) Oral at bedtime    MEDICATIONS  (PRN):  acetaminophen     Tablet .. 650 milliGRAM(s) Oral every 6 hours PRN Mild Pain (1 - 3), Moderate Pain (4 - 6), Severe Pain (7 - 10)  aluminum hydroxide/magnesium hydroxide/simethicone Suspension 30 milliLiter(s) Oral every 6 hours PRN Dyspepsia  bisacodyl 5 milliGRAM(s) Oral at bedtime PRN Constipation  LORazepam     Tablet 1 milliGRAM(s) Oral every 6 hours PRN Severe Anxiety/Agitation  LORazepam     Tablet 0.5 milliGRAM(s) Oral every 6 hours PRN Anxiety  LORazepam   Injectable 2 milliGRAM(s) IntraMuscular Once PRN severe agitation  magnesium hydroxide Suspension 30 milliLiter(s) Oral daily PRN Constipation

## 2024-04-05 NOTE — BH INPATIENT PSYCHIATRY PROGRESS NOTE - NSBHFUPINTERVALHXFT_PSY_A_CORE
Patient seen for management of MDD, anxiety in group room by NP/pharmacist/NP. Case discussed with care team. No acute events overnight. When asked about mood patient responds "I don't have a mood, I feel tired". When asked if she still feels depressed, initially says yes. Denies anxiety. Clarified that she feels frustrated about her difficulty walking rather than depressed. Notably, patient appears brighter. She smiles, makes eye contact, and made a joke during the interview. When informed of this, patient endorses feeling improved over this week. She also endorses feeling as though her walking is improving. Still has not had a BM since Tuesday, senna held today in anticipation of ECT this afternoon. Encouraged to walk and hydrate to help with constipation. Sleep and appetite good. Denies A/V H. Denies SI/I/P.  Patient seen for management of MDD, anxiety in group room by NP/pharmacist/NP. Case discussed with care team. No acute events overnight. When asked about mood patient responds "I don't have a mood, I feel tired". When asked if she still feels depressed, initially says yes. Denies anxiety. Clarified that she feels frustrated about her difficulty walking rather than depressed. Notably, patient appears brighter. She smiles, makes eye contact, and made a joke during the interview. When informed of this, patient endorses feeling improvement over this week. She also endorses feeling as though her walking is improving. Still has not had a BM since Tuesday, Miralax held today in anticipation of ECT this afternoon. Encouraged to walk and hydrate to help with constipation. Sleep and appetite good. Denies A/V H. Denies SI/I/P.

## 2024-04-05 NOTE — BH INPATIENT PSYCHIATRY PROGRESS NOTE - NSBHCHARTREVIEWVS_PSY_A_CORE FT
Vital Signs Last 24 Hrs  T(C): 36.6 (04-05-24 @ 14:58), Max: 36.8 (04-04-24 @ 19:46)  T(F): 97.9 (04-05-24 @ 14:58), Max: 98.3 (04-04-24 @ 19:46)  HR: 106 (04-05-24 @ 15:25) (88 - 110)  BP: 129/89 (04-05-24 @ 15:25) (120/68 - 154/86)  BP(mean): 86 (04-04-24 @ 19:46) (86 - 86)  RR: 16 (04-05-24 @ 15:25) (16 - 18)  SpO2: 98% (04-05-24 @ 15:25) (98% - 99%)    Orthostatic VS  04-05-24 @ 07:27  Lying BP: --/-- HR: --  Sitting BP: 124/84 HR: 92  Standing BP: 120/75 HR: 90  Site: --  Mode: --  Orthostatic VS  04-04-24 @ 07:32  Lying BP: --/-- HR: --  Sitting BP: 111/68 HR: 92  Standing BP: 109/69 HR: 106  Site: --  Mode: --   Vital Signs Last 24 Hrs  T(C): 36.7 (04-05-24 @ 16:30), Max: 36.8 (04-04-24 @ 19:46)  T(F): 98 (04-05-24 @ 16:30), Max: 98.3 (04-04-24 @ 19:46)  HR: 91 (04-05-24 @ 16:30) (88 - 110)  BP: 138/77 (04-05-24 @ 16:30) (120/68 - 154/86)  BP(mean): 86 (04-04-24 @ 19:46) (86 - 86)  RR: 18 (04-05-24 @ 16:30) (16 - 18)  SpO2: 99% (04-05-24 @ 16:30) (98% - 99%)    Orthostatic VS  04-05-24 @ 07:27  Lying BP: --/-- HR: --  Sitting BP: 124/84 HR: 92  Standing BP: 120/75 HR: 90  Site: --  Mode: --  Orthostatic VS  04-04-24 @ 07:32  Lying BP: --/-- HR: --  Sitting BP: 111/68 HR: 92  Standing BP: 109/69 HR: 106  Site: --  Mode: --   Vital Signs Last 24 Hrs  T(C): 36.7 (04-06-24 @ 07:39), Max: 36.7 (04-05-24 @ 14:42)  T(F): 98 (04-06-24 @ 07:39), Max: 98.1 (04-05-24 @ 14:42)  HR: 95 (04-05-24 @ 20:35) (88 - 110)  BP: 129/86 (04-05-24 @ 20:35) (120/68 - 154/86)  BP(mean): --  RR: 19 (04-06-24 @ 07:39) (16 - 19)  SpO2: 99% (04-05-24 @ 16:30) (98% - 99%)    Orthostatic VS  04-06-24 @ 07:39  Lying BP: --/-- HR: --  Sitting BP: 108/79 HR: 96  Standing BP: 102/64 HR: 98  Site: --  Mode: --  Orthostatic VS  04-05-24 @ 07:27  Lying BP: --/-- HR: --  Sitting BP: 124/84 HR: 92  Standing BP: 120/75 HR: 90  Site: --  Mode: --

## 2024-04-05 NOTE — BH INPATIENT PSYCHIATRY PROGRESS NOTE - OTHER
"frustrated" Patient walks slowly, no falls, has a walker psychomotor retardation  patient appears depressed but slightly brighter from yesterday, smiling and laughing at times

## 2024-04-05 NOTE — BH INPATIENT PSYCHIATRY PROGRESS NOTE - PRN MEDS
MEDICATIONS  (PRN):  acetaminophen     Tablet .. 650 milliGRAM(s) Oral every 6 hours PRN Mild Pain (1 - 3), Moderate Pain (4 - 6), Severe Pain (7 - 10)  aluminum hydroxide/magnesium hydroxide/simethicone Suspension 30 milliLiter(s) Oral every 6 hours PRN Dyspepsia  bisacodyl 5 milliGRAM(s) Oral at bedtime PRN Constipation  LORazepam     Tablet 0.5 milliGRAM(s) Oral every 6 hours PRN Anxiety  LORazepam     Tablet 1 milliGRAM(s) Oral every 6 hours PRN Severe Anxiety/Agitation  LORazepam   Injectable 2 milliGRAM(s) IntraMuscular Once PRN severe agitation  magnesium hydroxide Suspension 30 milliLiter(s) Oral daily PRN Constipation   MEDICATIONS  (PRN):  acetaminophen     Tablet .. 650 milliGRAM(s) Oral every 6 hours PRN Mild Pain (1 - 3), Moderate Pain (4 - 6), Severe Pain (7 - 10)  aluminum hydroxide/magnesium hydroxide/simethicone Suspension 30 milliLiter(s) Oral every 6 hours PRN Dyspepsia  bisacodyl 5 milliGRAM(s) Oral at bedtime PRN Constipation  LORazepam     Tablet 1 milliGRAM(s) Oral every 6 hours PRN Severe Anxiety/Agitation  LORazepam     Tablet 0.5 milliGRAM(s) Oral every 6 hours PRN Anxiety  LORazepam   Injectable 2 milliGRAM(s) IntraMuscular Once PRN severe agitation  magnesium hydroxide Suspension 30 milliLiter(s) Oral daily PRN Constipation

## 2024-04-05 NOTE — BH INPATIENT PSYCHIATRY PROGRESS NOTE - NSBHASSESSSUMMFT_PSY_ALL_CORE
69-year-old female, , no children, retired 3 years ago (was ), domiciled with  in a private residence with . PPHx of Depression and LEO, engaged in outpatient tx with Dr Schmid x20+ years and therapist Cole Romero. No previous psychiatric admissions, denies previous suicide attempts, denies NSSIB. Pt denies all substance use. Pt denies legal issues and denies hx of violence/aggression. Denies access to firearms. Pertinent PMH HLD, HTN, IBS, BONIFACIO has CPAP (has not used for months). Referred by [facility] for poor functioning and inability to care for oneself due to worsening anxiety and depression.     Patient reports symptoms of depression including dysphoric mood, diminished interest in activities, and fatigue/little energy that prevents her from completing ADLs. She reports recent history of 20 lb weight loss "months ago" and states that she gained back 10 lbs. Additionally reports severe anxiety that prevents her from leaving the house, states that she last left her home ~1 month ago. Denies AVH. Denies SI/I/P. Displays insight into need for pharmacologic treatment for depression/anxiety.    On assessment, patient appears slightly brighter from yesterday, smiling. NPO for ECT #2. Denies A/V hallucinations. Denies SI/I/P.     Plan:  1. Ad[facility] to [facility], 9.13 legal status.    2. Routine observation with q 15 minute checks. Patient denies suicidality, remains in behavioral control.    3. Psych: continue home medication regimen pending collateral.  Prozac 40 mg daily  Rexulti 1.5 mg daily  Buspirone 10 mg BID  Trazodone 100 mg QHS  D/C Klonopin  D/C Hydroxyzine  --PRN Ativan 2 mg IM for severe agitation    4. Medical: continue home medication regimen. Labs reviewed.  Amlodipine 2.5 mg daily   Aspirin 81 mg daily  Losartan 50 mg daily  Hydrochlorothiazide 25 mg po daily  Pravastatin 20 mg po daily  B complex/vitamin C/multivitamin    5. ECT  ECT #2 today   ECT consult completed 3/26  Patient and  have reviewed ECT documents     6. Encourage individual, group, and milieu therapy.    7. Collaboration with outpatient psychiatrist Dr. Chester Schmid, [phone].    8. Pending collateral from , Kevin, [phone].    9. Dispo collaboration with social work.

## 2024-04-05 NOTE — BH INPATIENT PSYCHIATRY PROGRESS NOTE - NSBHMETABOLIC_PSY_ALL_CORE_FT
BMI: BMI (kg/m2): 33.6 (03-22-24 @ 12:45)  HbA1c:   Glucose:   BP: 129/89 (04-05-24 @ 15:25) (102/79 - 154/86)Vital Signs Last 24 Hrs  T(C): 36.6 (04-05-24 @ 14:58), Max: 36.8 (04-04-24 @ 19:46)  T(F): 97.9 (04-05-24 @ 14:58), Max: 98.3 (04-04-24 @ 19:46)  HR: 106 (04-05-24 @ 15:25) (88 - 110)  BP: 129/89 (04-05-24 @ 15:25) (120/68 - 154/86)  BP(mean): 86 (04-04-24 @ 19:46) (86 - 86)  RR: 16 (04-05-24 @ 15:25) (16 - 18)  SpO2: 98% (04-05-24 @ 15:25) (98% - 99%)    Orthostatic VS  04-05-24 @ 07:27  Lying BP: --/-- HR: --  Sitting BP: 124/84 HR: 92  Standing BP: 120/75 HR: 90  Site: --  Mode: --  Orthostatic VS  04-04-24 @ 07:32  Lying BP: --/-- HR: --  Sitting BP: 111/68 HR: 92  Standing BP: 109/69 HR: 106  Site: --  Mode: --    Lipid Panel:  BMI: BMI (kg/m2): 33.6 (03-22-24 @ 12:45)  HbA1c:   Glucose:   BP: 138/77 (04-05-24 @ 16:30) (102/79 - 154/86)Vital Signs Last 24 Hrs  T(C): 36.7 (04-05-24 @ 16:30), Max: 36.8 (04-04-24 @ 19:46)  T(F): 98 (04-05-24 @ 16:30), Max: 98.3 (04-04-24 @ 19:46)  HR: 91 (04-05-24 @ 16:30) (88 - 110)  BP: 138/77 (04-05-24 @ 16:30) (120/68 - 154/86)  BP(mean): 86 (04-04-24 @ 19:46) (86 - 86)  RR: 18 (04-05-24 @ 16:30) (16 - 18)  SpO2: 99% (04-05-24 @ 16:30) (98% - 99%)    Orthostatic VS  04-05-24 @ 07:27  Lying BP: --/-- HR: --  Sitting BP: 124/84 HR: 92  Standing BP: 120/75 HR: 90  Site: --  Mode: --  Orthostatic VS  04-04-24 @ 07:32  Lying BP: --/-- HR: --  Sitting BP: 111/68 HR: 92  Standing BP: 109/69 HR: 106  Site: --  Mode: --    Lipid Panel:  BMI: BMI (kg/m2): 33.6 (03-22-24 @ 12:45)  HbA1c:   Glucose:   BP: 129/86 (04-05-24 @ 20:35) (102/79 - 154/86)Vital Signs Last 24 Hrs  T(C): 36.7 (04-06-24 @ 07:39), Max: 36.7 (04-05-24 @ 14:42)  T(F): 98 (04-06-24 @ 07:39), Max: 98.1 (04-05-24 @ 14:42)  HR: 95 (04-05-24 @ 20:35) (88 - 110)  BP: 129/86 (04-05-24 @ 20:35) (120/68 - 154/86)  BP(mean): --  RR: 19 (04-06-24 @ 07:39) (16 - 19)  SpO2: 99% (04-05-24 @ 16:30) (98% - 99%)    Orthostatic VS  04-06-24 @ 07:39  Lying BP: --/-- HR: --  Sitting BP: 108/79 HR: 96  Standing BP: 102/64 HR: 98  Site: --  Mode: --  Orthostatic VS  04-05-24 @ 07:27  Lying BP: --/-- HR: --  Sitting BP: 124/84 HR: 92  Standing BP: 120/75 HR: 90  Site: --  Mode: --    Lipid Panel:

## 2024-04-06 RX ADMIN — BREXPIPRAZOLE 1.5 MILLIGRAM(S): 0.25 TABLET ORAL at 10:55

## 2024-04-06 RX ADMIN — POLYETHYLENE GLYCOL 3350 17 GRAM(S): 17 POWDER, FOR SOLUTION ORAL at 10:54

## 2024-04-06 RX ADMIN — SENNA PLUS 2 TABLET(S): 8.6 TABLET ORAL at 20:34

## 2024-04-06 RX ADMIN — Medication 10 MILLIGRAM(S): at 20:34

## 2024-04-06 RX ADMIN — Medication 100 MILLIGRAM(S): at 20:34

## 2024-04-06 RX ADMIN — ATORVASTATIN CALCIUM 20 MILLIGRAM(S): 80 TABLET, FILM COATED ORAL at 20:35

## 2024-04-06 RX ADMIN — Medication 40 MILLIGRAM(S): at 10:55

## 2024-04-06 RX ADMIN — Medication 10 MILLIGRAM(S): at 10:55

## 2024-04-06 RX ADMIN — AMLODIPINE BESYLATE 2.5 MILLIGRAM(S): 2.5 TABLET ORAL at 10:55

## 2024-04-06 RX ADMIN — Medication 81 MILLIGRAM(S): at 10:55

## 2024-04-06 RX ADMIN — LOSARTAN POTASSIUM 50 MILLIGRAM(S): 100 TABLET, FILM COATED ORAL at 10:55

## 2024-04-07 RX ADMIN — BREXPIPRAZOLE 1.5 MILLIGRAM(S): 0.25 TABLET ORAL at 09:42

## 2024-04-07 RX ADMIN — ATORVASTATIN CALCIUM 20 MILLIGRAM(S): 80 TABLET, FILM COATED ORAL at 21:15

## 2024-04-07 RX ADMIN — Medication 40 MILLIGRAM(S): at 09:41

## 2024-04-07 RX ADMIN — Medication 10 MILLIGRAM(S): at 09:42

## 2024-04-07 RX ADMIN — Medication 10 MILLIGRAM(S): at 21:15

## 2024-04-07 RX ADMIN — Medication 650 MILLIGRAM(S): at 14:23

## 2024-04-07 RX ADMIN — POLYETHYLENE GLYCOL 3350 17 GRAM(S): 17 POWDER, FOR SOLUTION ORAL at 09:41

## 2024-04-07 RX ADMIN — SENNA PLUS 2 TABLET(S): 8.6 TABLET ORAL at 21:15

## 2024-04-07 RX ADMIN — Medication 81 MILLIGRAM(S): at 09:42

## 2024-04-07 RX ADMIN — Medication 100 MILLIGRAM(S): at 21:15

## 2024-04-08 PROCEDURE — 99232 SBSQ HOSP IP/OBS MODERATE 35: CPT | Mod: 25

## 2024-04-08 PROCEDURE — 90870 ELECTROCONVULSIVE THERAPY: CPT

## 2024-04-08 RX ORDER — FLUMAZENIL 0.1 MG/ML
0.4 VIAL (ML) INTRAVENOUS ONCE
Refills: 0 | Status: COMPLETED | OUTPATIENT
Start: 2024-04-08 | End: 2024-04-08

## 2024-04-08 RX ADMIN — Medication 0.4 MILLIGRAM(S): at 17:10

## 2024-04-08 RX ADMIN — Medication 40 MILLIGRAM(S): at 08:20

## 2024-04-08 RX ADMIN — ATORVASTATIN CALCIUM 20 MILLIGRAM(S): 80 TABLET, FILM COATED ORAL at 21:16

## 2024-04-08 RX ADMIN — AMLODIPINE BESYLATE 2.5 MILLIGRAM(S): 2.5 TABLET ORAL at 08:19

## 2024-04-08 RX ADMIN — LOSARTAN POTASSIUM 50 MILLIGRAM(S): 100 TABLET, FILM COATED ORAL at 08:20

## 2024-04-08 RX ADMIN — SENNA PLUS 2 TABLET(S): 8.6 TABLET ORAL at 21:16

## 2024-04-08 RX ADMIN — Medication 10 MILLIGRAM(S): at 08:19

## 2024-04-08 RX ADMIN — Medication 0.5 MILLIGRAM(S): at 11:21

## 2024-04-08 RX ADMIN — Medication 81 MILLIGRAM(S): at 08:20

## 2024-04-08 RX ADMIN — Medication 10 MILLIGRAM(S): at 21:15

## 2024-04-08 RX ADMIN — Medication 100 MILLIGRAM(S): at 21:16

## 2024-04-08 RX ADMIN — BREXPIPRAZOLE 1.5 MILLIGRAM(S): 0.25 TABLET ORAL at 08:20

## 2024-04-08 NOTE — BH INPATIENT PSYCHIATRY PROGRESS NOTE - NSBHASSESSSUMMFT_PSY_ALL_CORE
69-year-old female, , no children, retired 3 years ago (was ), domiciled with  in a private residence with . PPHx of Depression and LEO, engaged in outpatient tx with Dr Schimd x20+ years and therapist Cole Romero. No previous psychiatric admissions, denies previous suicide attempts, denies NSSIB. Pt denies all substance use. Pt denies legal issues and denies hx of violence/aggression. Denies access to firearms. Pertinent PMH HLD, HTN, IBS, BONIFACIO has CPAP (has not used for months). Referred by [facility] for poor functioning and inability to care for oneself due to worsening anxiety and depression.     Patient reports symptoms of depression including dysphoric mood, diminished interest in activities, and fatigue/little energy that prevents her from completing ADLs. She reports recent history of 20 lb weight loss "months ago" and states that she gained back 10 lbs. Additionally reports severe anxiety that prevents her from leaving the house, states that she last left her home ~1 month ago. Denies AVH. Denies SI/I/P. Displays insight into need for pharmacologic treatment for depression/anxiety.    Patient NPO for ECT #3, feeling hungry and "pissed off" due to the late ECT slot. Upon assessment, continues to appear depressed but smiling at times. MOCA deferred today, plan to perform tomorrow. VSS. Denies A/V hallucinations. Denies SI/I/P.     Plan:  1. Admit to [facility], 9.13 legal status.    2. Routine observation with q 15 minute checks. Patient denies suicidality, remains in behavioral control.    3. Psych: continue home medication regimen pending collateral.  Prozac 40 mg daily  Rexulti 1.5 mg daily  Buspirone 10 mg BID  Trazodone 100 mg QHS  D/C Klonopin  D/C Hydroxyzine  --PRN Ativan 2 mg IM for severe agitation    4. Medical: continue home medication regimen. Labs reviewed.  Amlodipine 2.5 mg daily   Aspirin 81 mg daily  Losartan 50 mg daily  Hydrochlorothiazide 25 mg po daily  Pravastatin 20 mg po daily  B complex/vitamin C/multivitamin    5. ECT  Patient NPO for ECT #3 today (~4:45)  ECT consult completed 3/26  Patient and  have reviewed ECT documents     6. Encourage individual, group, and milieu therapy.    7. Collaboration with outpatient psychiatrist Dr. Chester Schmid, [phone].    8. Pending collateral from , Kevin, [phone].    9. Dispo collaboration with social work.

## 2024-04-08 NOTE — BH INPATIENT PSYCHIATRY PROGRESS NOTE - OTHER
"horrible", "pissed-off" Sitting in bed Patient walks slowly, no falls, has a walker psychomotor retardation  patient appears depressed but still brighter than at initial presentation, smiling at times

## 2024-04-08 NOTE — BH INPATIENT PSYCHIATRY PROGRESS NOTE - NSBHFUPINTERVALHXFT_PSY_A_CORE
Patient seen for management of MDD and anxiety by NP/pharmacist/NP student/MS3. VSS. Patient NPO for ECT #3 today. Patient interviewed in her room because she was not feeling well enough to meet in the group room today due to hunger and anxiety from having her ECT time slot at 4:45pm. As a result of the late ECT session, she feels "off today." Her mood is "horrible". When asked to elaborate, she says she is "pissed off" due to the late ECT session and her hunger. Notably, the patient continues to appear slightly brighter than when she initially presented, smiling at times and thanking the staff at the conclusion of the interview. Denies depressive symptoms, says "I want to go home". Patient feels like her walking is slightly improved. Denies constipation today. Sleep and appetite good. Denies A/V H, denies SI/I/P. MOCA deferred today due to patient "not feeling up to it."  Patient seen for management of MDD and anxiety by NP/pharmacist/NP student/MS3. Case discussed with care team. VSS. No acute events overnight. Patient NPO for ECT #3 today. Patient interviewed in her room because she was not feeling well enough to meet in the group room today due to hunger and anxiety from having her ECT time slot at 4:45pm. As a result of the late ECT session, she feels "off today." Her mood is "horrible". When asked to elaborate, she says she is "pissed off" due to the late ECT session and her hunger. Notably, the patient continues to appear slightly brighter than when she initially presented, smiling at times and thanking the staff at the conclusion of the interview. Denies depressive symptoms, says "I want to go home". Patient feels like her walking is slightly improved. Denies constipation today. Sleep and appetite good. Denies A/V H, denies SI/I/P. MOCA deferred today due to patient "not feeling up to it."

## 2024-04-08 NOTE — BH INPATIENT PSYCHIATRY PROGRESS NOTE - NSBHMETABOLIC_PSY_ALL_CORE_FT
BMI: BMI (kg/m2): 33.6 (03-22-24 @ 12:45)  HbA1c:   Glucose:   BP: 118/78 (04-06-24 @ 10:45) (118/78 - 154/86)Vital Signs Last 24 Hrs  T(C): 36.7 (04-08-24 @ 07:35), Max: 36.7 (04-08-24 @ 07:35)  T(F): 98 (04-08-24 @ 07:35), Max: 98 (04-08-24 @ 07:35)  HR: --  BP: --  BP(mean): --  RR: 18 (04-08-24 @ 07:35) (18 - 18)  SpO2: 99% (04-08-24 @ 07:35) (98% - 99%)    Orthostatic VS  04-08-24 @ 07:35  Lying BP: --/-- HR: --  Sitting BP: 131/81 HR: 89  Standing BP: 126/84 HR: 97  Site: --  Mode: --  Orthostatic VS  04-07-24 @ 20:09  Lying BP: --/-- HR: --  Sitting BP: 147/89 HR: 84  Standing BP: 134/75 HR: 95  Site: --  Mode: --  Orthostatic VS  04-07-24 @ 08:30  Lying BP: --/-- HR: --  Sitting BP: 133/75 HR: 80  Standing BP: 123/65 HR: 95  Site: --  Mode: --  Orthostatic VS  04-06-24 @ 20:41  Lying BP: --/-- HR: --  Sitting BP: 135/82 HR: 87  Standing BP: 129/70 HR: 97  Site: --  Mode: --    Lipid Panel:  BMI: BMI (kg/m2): 33.6 (03-22-24 @ 12:45)  HbA1c:   Glucose:   BP: 139/78 (04-08-24 @ 18:15) (118/78 - 164/84)Vital Signs Last 24 Hrs  T(C): 36.5 (04-08-24 @ 18:15), Max: 37.2 (04-08-24 @ 17:09)  T(F): 97.7 (04-08-24 @ 18:15), Max: 98.9 (04-08-24 @ 17:09)  HR: 92 (04-08-24 @ 18:15) (84 - 113)  BP: 139/78 (04-08-24 @ 18:15) (139/78 - 164/84)  BP(mean): --  RR: 18 (04-08-24 @ 18:00) (16 - 18)  SpO2: 96% (04-08-24 @ 18:00) (96% - 99%)    Orthostatic VS  04-08-24 @ 07:35  Lying BP: --/-- HR: --  Sitting BP: 131/81 HR: 89  Standing BP: 126/84 HR: 97  Site: --  Mode: --  Orthostatic VS  04-07-24 @ 20:09  Lying BP: --/-- HR: --  Sitting BP: 147/89 HR: 84  Standing BP: 134/75 HR: 95  Site: --  Mode: --  Orthostatic VS  04-07-24 @ 08:30  Lying BP: --/-- HR: --  Sitting BP: 133/75 HR: 80  Standing BP: 123/65 HR: 95  Site: --  Mode: --  Orthostatic VS  04-06-24 @ 20:41  Lying BP: --/-- HR: --  Sitting BP: 135/82 HR: 87  Standing BP: 129/70 HR: 97  Site: --  Mode: --    Lipid Panel:

## 2024-04-08 NOTE — BH INPATIENT PSYCHIATRY PROGRESS NOTE - NSBHCHARTREVIEWVS_PSY_A_CORE FT
Vital Signs Last 24 Hrs  T(C): 36.7 (04-08-24 @ 07:35), Max: 36.7 (04-08-24 @ 07:35)  T(F): 98 (04-08-24 @ 07:35), Max: 98 (04-08-24 @ 07:35)  HR: --  BP: --  BP(mean): --  RR: 18 (04-08-24 @ 07:35) (18 - 18)  SpO2: 99% (04-08-24 @ 07:35) (98% - 99%)    Orthostatic VS  04-08-24 @ 07:35  Lying BP: --/-- HR: --  Sitting BP: 131/81 HR: 89  Standing BP: 126/84 HR: 97  Site: --  Mode: --  Orthostatic VS  04-07-24 @ 20:09  Lying BP: --/-- HR: --  Sitting BP: 147/89 HR: 84  Standing BP: 134/75 HR: 95  Site: --  Mode: --  Orthostatic VS  04-07-24 @ 08:30  Lying BP: --/-- HR: --  Sitting BP: 133/75 HR: 80  Standing BP: 123/65 HR: 95  Site: --  Mode: --  Orthostatic VS  04-06-24 @ 20:41  Lying BP: --/-- HR: --  Sitting BP: 135/82 HR: 87  Standing BP: 129/70 HR: 97  Site: --  Mode: --   Vital Signs Last 24 Hrs  T(C): 36.5 (04-08-24 @ 18:15), Max: 37.2 (04-08-24 @ 17:09)  T(F): 97.7 (04-08-24 @ 18:15), Max: 98.9 (04-08-24 @ 17:09)  HR: 92 (04-08-24 @ 18:15) (84 - 113)  BP: 139/78 (04-08-24 @ 18:15) (139/78 - 164/84)  BP(mean): --  RR: 18 (04-08-24 @ 18:00) (16 - 18)  SpO2: 96% (04-08-24 @ 18:00) (96% - 99%)    Orthostatic VS  04-08-24 @ 07:35  Lying BP: --/-- HR: --  Sitting BP: 131/81 HR: 89  Standing BP: 126/84 HR: 97  Site: --  Mode: --  Orthostatic VS  04-07-24 @ 20:09  Lying BP: --/-- HR: --  Sitting BP: 147/89 HR: 84  Standing BP: 134/75 HR: 95  Site: --  Mode: --  Orthostatic VS  04-07-24 @ 08:30  Lying BP: --/-- HR: --  Sitting BP: 133/75 HR: 80  Standing BP: 123/65 HR: 95  Site: --  Mode: --  Orthostatic VS  04-06-24 @ 20:41  Lying BP: --/-- HR: --  Sitting BP: 135/82 HR: 87  Standing BP: 129/70 HR: 97  Site: --  Mode: --

## 2024-04-09 PROCEDURE — 99232 SBSQ HOSP IP/OBS MODERATE 35: CPT | Mod: FS

## 2024-04-09 RX ADMIN — LOSARTAN POTASSIUM 50 MILLIGRAM(S): 100 TABLET, FILM COATED ORAL at 09:46

## 2024-04-09 RX ADMIN — BREXPIPRAZOLE 1.5 MILLIGRAM(S): 0.25 TABLET ORAL at 09:45

## 2024-04-09 RX ADMIN — Medication 81 MILLIGRAM(S): at 09:45

## 2024-04-09 RX ADMIN — Medication 40 MILLIGRAM(S): at 09:45

## 2024-04-09 RX ADMIN — AMLODIPINE BESYLATE 2.5 MILLIGRAM(S): 2.5 TABLET ORAL at 09:43

## 2024-04-09 RX ADMIN — ATORVASTATIN CALCIUM 20 MILLIGRAM(S): 80 TABLET, FILM COATED ORAL at 21:00

## 2024-04-09 RX ADMIN — POLYETHYLENE GLYCOL 3350 17 GRAM(S): 17 POWDER, FOR SOLUTION ORAL at 09:43

## 2024-04-09 RX ADMIN — SENNA PLUS 2 TABLET(S): 8.6 TABLET ORAL at 21:00

## 2024-04-09 RX ADMIN — Medication 100 MILLIGRAM(S): at 21:00

## 2024-04-09 RX ADMIN — Medication 10 MILLIGRAM(S): at 09:46

## 2024-04-09 RX ADMIN — Medication 10 MILLIGRAM(S): at 21:00

## 2024-04-09 NOTE — BH INPATIENT PSYCHIATRY PROGRESS NOTE - OTHER
psychomotor retardation  patient appears depressed but still brighter than at initial presentation, smiling at times  Patient walks slowly, no falls, has a walker "horrible", "pissed-off" Sitting in bed

## 2024-04-09 NOTE — BH INPATIENT PSYCHIATRY PROGRESS NOTE - PRN MEDS
MEDICATIONS  (PRN):  acetaminophen     Tablet .. 650 milliGRAM(s) Oral every 6 hours PRN Mild Pain (1 - 3), Moderate Pain (4 - 6), Severe Pain (7 - 10)  aluminum hydroxide/magnesium hydroxide/simethicone Suspension 30 milliLiter(s) Oral every 6 hours PRN Dyspepsia  bisacodyl 5 milliGRAM(s) Oral at bedtime PRN Constipation  LORazepam     Tablet 0.5 milliGRAM(s) Oral every 6 hours PRN Anxiety  LORazepam     Tablet 1 milliGRAM(s) Oral every 6 hours PRN Severe Anxiety/Agitation  LORazepam   Injectable 2 milliGRAM(s) IntraMuscular Once PRN severe agitation  magnesium hydroxide Suspension 30 milliLiter(s) Oral daily PRN Constipation

## 2024-04-09 NOTE — BH INPATIENT PSYCHIATRY PROGRESS NOTE - NSBHASSESSSUMMFT_PSY_ALL_CORE
69-year-old female, , no children, retired 3 years ago (was ), domiciled with  in a private residence with . PPHx of Depression and LEO, engaged in outpatient tx with Dr Schmid x20+ years and therapist Cole Romero. No previous psychiatric admissions, denies previous suicide attempts, denies NSSIB. Pt denies all substance use. Pt denies legal issues and denies hx of violence/aggression. Denies access to firearms. Pertinent PMH HLD, HTN, IBS, BONIFACIO has CPAP (has not used for months). Referred by [facility] for poor functioning and inability to care for oneself due to worsening anxiety and depression.     Patient reports symptoms of depression including dysphoric mood, diminished interest in activities, and fatigue/little energy that prevents her from completing ADLs. She reports recent history of 20 lb weight loss "months ago" and states that she gained back 10 lbs. Additionally reports severe anxiety that prevents her from leaving the house, states that she last left her home ~1 month ago. Denies AVH. Denies SI/I/P. Displays insight into need for pharmacologic treatment for depression/anxiety.    Patient presented as brighter, less depressed, remains isolative. MOCA deferred today, plan to perform tomorrow. VSS. Denies A/V hallucinations. Denies SI/I/P.     Plan:  1. Admit to [facility], 9.13 legal status.    2. Routine observation with q 15 minute checks. Patient denies suicidality, remains in behavioral control.    3. Psych: continue home medication regimen pending collateral.  Prozac 40 mg daily  Rexulti 1.5 mg daily  Buspirone 10 mg BID  Trazodone 100 mg QHS  D/C Klonopin  D/C Hydroxyzine  --PRN Ativan 2 mg IM for severe agitation    4. Medical: continue home medication regimen. Labs reviewed.  Amlodipine 2.5 mg daily   Aspirin 81 mg daily  Losartan 50 mg daily  Hydrochlorothiazide 25 mg po daily  Pravastatin 20 mg po daily  B complex/vitamin C/multivitamin    5. ECT  Patient NPO for ECT #3 today (~4:45)  ECT consult completed 3/26  Patient and  have reviewed ECT documents     6. Encourage individual, group, and milieu therapy.    7. Collaboration with outpatient psychiatrist Dr. Chester Schmid, [phone].    8. Pending collateral from , Kevin, [phone].    9. Dispo collaboration with social work.

## 2024-04-09 NOTE — BH INPATIENT PSYCHIATRY PROGRESS NOTE - NSBHATTESTBILLONSITE_PSY_A_CORE
UPDATE:  Office visit pain clinic in [facility] with all required elements of H&P dated 11/03/2022  Patient seen preop, chart reviewed,   No changes in medical history and health assessment since last evaluation. PE:  AAO x 3, in NAD, VSS,. Resp: breathing on RA, no respiratory distress  Cardiac: rate normal    Risk / Benefits explained to patient, patient agree to proceed with plan.   ASA 3  MP 3 Attending to bill

## 2024-04-09 NOTE — BH INPATIENT PSYCHIATRY PROGRESS NOTE - NSBHMETABOLIC_PSY_ALL_CORE_FT
BMI: BMI (kg/m2): 33.6 (03-22-24 @ 12:45)  HbA1c:   Glucose:   BP: 139/78 (04-08-24 @ 18:15) (139/78 - 164/84)Vital Signs Last 24 Hrs  T(C): 36.4 (04-09-24 @ 07:42), Max: 36.5 (04-08-24 @ 18:15)  T(F): 97.5 (04-09-24 @ 07:42), Max: 97.7 (04-08-24 @ 18:15)  HR: 92 (04-08-24 @ 18:15) (92 - 100)  BP: 139/78 (04-08-24 @ 18:15) (139/78 - 139/78)  BP(mean): --  RR: 19 (04-09-24 @ 07:42) (18 - 19)  SpO2: 97% (04-09-24 @ 04:33) (96% - 98%)    Orthostatic VS  04-09-24 @ 07:42  Lying BP: --/-- HR: --  Sitting BP: 122/77 HR: 90  Standing BP: 110/65 HR: 108  Site: --  Mode: --  Orthostatic VS  04-08-24 @ 20:50  Lying BP: --/-- HR: --  Sitting BP: 145/64 HR: 96  Standing BP: 140/80 HR: 104  Site: --  Mode: --  Orthostatic VS  04-08-24 @ 07:35  Lying BP: --/-- HR: --  Sitting BP: 131/81 HR: 89  Standing BP: 126/84 HR: 97  Site: --  Mode: --  Orthostatic VS  04-07-24 @ 20:09  Lying BP: --/-- HR: --  Sitting BP: 147/89 HR: 84  Standing BP: 134/75 HR: 95  Site: --  Mode: --    Lipid Panel:  BMI: BMI (kg/m2): 33.6 (03-22-24 @ 12:45)  HbA1c:   Glucose:   BP: 139/78 (04-08-24 @ 18:15) (139/78 - 164/84)Vital Signs Last 24 Hrs  T(C): 36.6 (04-09-24 @ 20:17), Max: 36.6 (04-09-24 @ 20:17)  T(F): 97.8 (04-09-24 @ 20:17), Max: 97.8 (04-09-24 @ 20:17)  HR: --  BP: --  BP(mean): --  RR: 19 (04-09-24 @ 07:42) (19 - 19)  SpO2: 97% (04-09-24 @ 04:33) (97% - 98%)    Orthostatic VS  04-09-24 @ 20:17  Lying BP: --/-- HR: --  Sitting BP: 139/78 HR: 92  Standing BP: 132/81 HR: 95  Site: --  Mode: --  Orthostatic VS  04-09-24 @ 07:42  Lying BP: --/-- HR: --  Sitting BP: 122/77 HR: 90  Standing BP: 110/65 HR: 108  Site: --  Mode: --  Orthostatic VS  04-08-24 @ 20:50  Lying BP: --/-- HR: --  Sitting BP: 145/64 HR: 96  Standing BP: 140/80 HR: 104  Site: --  Mode: --  Orthostatic VS  04-08-24 @ 07:35  Lying BP: --/-- HR: --  Sitting BP: 131/81 HR: 89  Standing BP: 126/84 HR: 97  Site: --  Mode: --    Lipid Panel:

## 2024-04-09 NOTE — BH INPATIENT PSYCHIATRY PROGRESS NOTE - CURRENT MEDICATION
MEDICATIONS  (STANDING):  amLODIPine   Tablet 2.5 milliGRAM(s) Oral daily  aspirin  chewable 81 milliGRAM(s) Oral daily  atorvastatin 20 milliGRAM(s) Oral at bedtime  brexpiprazole 1.5 milliGRAM(s) Oral daily  busPIRone 10 milliGRAM(s) Oral two times a day  FLUoxetine 40 milliGRAM(s) Oral daily  hydrochlorothiazide 25 milliGRAM(s) Oral daily  losartan 50 milliGRAM(s) Oral daily  polyethylene glycol 3350 17 Gram(s) Oral daily  senna 2 Tablet(s) Oral at bedtime  traZODone 100 milliGRAM(s) Oral at bedtime    MEDICATIONS  (PRN):  acetaminophen     Tablet .. 650 milliGRAM(s) Oral every 6 hours PRN Mild Pain (1 - 3), Moderate Pain (4 - 6), Severe Pain (7 - 10)  aluminum hydroxide/magnesium hydroxide/simethicone Suspension 30 milliLiter(s) Oral every 6 hours PRN Dyspepsia  bisacodyl 5 milliGRAM(s) Oral at bedtime PRN Constipation  LORazepam     Tablet 0.5 milliGRAM(s) Oral every 6 hours PRN Anxiety  LORazepam     Tablet 1 milliGRAM(s) Oral every 6 hours PRN Severe Anxiety/Agitation  LORazepam   Injectable 2 milliGRAM(s) IntraMuscular Once PRN severe agitation  magnesium hydroxide Suspension 30 milliLiter(s) Oral daily PRN Constipation

## 2024-04-09 NOTE — BH INPATIENT PSYCHIATRY PROGRESS NOTE - NSBHCHARTREVIEWVS_PSY_A_CORE FT
Vital Signs Last 24 Hrs  T(C): 36.4 (04-09-24 @ 07:42), Max: 36.5 (04-08-24 @ 18:15)  T(F): 97.5 (04-09-24 @ 07:42), Max: 97.7 (04-08-24 @ 18:15)  HR: 92 (04-08-24 @ 18:15) (92 - 100)  BP: 139/78 (04-08-24 @ 18:15) (139/78 - 139/78)  BP(mean): --  RR: 19 (04-09-24 @ 07:42) (18 - 19)  SpO2: 97% (04-09-24 @ 04:33) (96% - 98%)    Orthostatic VS  04-09-24 @ 07:42  Lying BP: --/-- HR: --  Sitting BP: 122/77 HR: 90  Standing BP: 110/65 HR: 108  Site: --  Mode: --  Orthostatic VS  04-08-24 @ 20:50  Lying BP: --/-- HR: --  Sitting BP: 145/64 HR: 96  Standing BP: 140/80 HR: 104  Site: --  Mode: --  Orthostatic VS  04-08-24 @ 07:35  Lying BP: --/-- HR: --  Sitting BP: 131/81 HR: 89  Standing BP: 126/84 HR: 97  Site: --  Mode: --  Orthostatic VS  04-07-24 @ 20:09  Lying BP: --/-- HR: --  Sitting BP: 147/89 HR: 84  Standing BP: 134/75 HR: 95  Site: --  Mode: --   Vital Signs Last 24 Hrs  T(C): 36.6 (04-09-24 @ 20:17), Max: 36.6 (04-09-24 @ 20:17)  T(F): 97.8 (04-09-24 @ 20:17), Max: 97.8 (04-09-24 @ 20:17)  HR: --  BP: --  BP(mean): --  RR: 19 (04-09-24 @ 07:42) (19 - 19)  SpO2: 97% (04-09-24 @ 04:33) (97% - 98%)    Orthostatic VS  04-09-24 @ 20:17  Lying BP: --/-- HR: --  Sitting BP: 139/78 HR: 92  Standing BP: 132/81 HR: 95  Site: --  Mode: --  Orthostatic VS  04-09-24 @ 07:42  Lying BP: --/-- HR: --  Sitting BP: 122/77 HR: 90  Standing BP: 110/65 HR: 108  Site: --  Mode: --  Orthostatic VS  04-08-24 @ 20:50  Lying BP: --/-- HR: --  Sitting BP: 145/64 HR: 96  Standing BP: 140/80 HR: 104  Site: --  Mode: --  Orthostatic VS  04-08-24 @ 07:35  Lying BP: --/-- HR: --  Sitting BP: 131/81 HR: 89  Standing BP: 126/84 HR: 97  Site: --  Mode: --

## 2024-04-09 NOTE — BH INPATIENT PSYCHIATRY PROGRESS NOTE - NSBHFUPINTERVALHXFT_PSY_A_CORE
Patient seen for management of MDD and anxiety by NP/pharmacist/NP student/MS3. Case discussed with care team. VSS. No acute events overnight. Patient reported feeling "lousy" today since ECT, was unable to elaborate, denied feeling depressed or anxious. Patient was in bed most of the day, awake. Patient reported no desire to come out of room today. Patient reported feeling shaky, no tremors noted. Patient was brighter today, smiling and making jokes, mentioned to patient, she denied noticing a difference in mood. Denies A/V H, denies SI/I/P. MOCA deferred today will attempt tomorrow. Inquired why she is taking Prozac, medication education provided. Discussed maintaining NPO after midnight tonight for ECT#4 tomorrow.  Patient seen for management of MDD and anxiety by NP/MD/MS3. Case discussed with care team. VSS. No acute events overnight. Patient reported feeling "lousy" today since ECT, was unable to elaborate, denied feeling depressed or anxious. Patient was in bed most of the day, awake. Patient reported no desire to come out of room today. Patient reported feeling shaky, no tremors noted. Patient was brighter today, smiling and making jokes, mentioned to patient, she denied noticing a difference in mood. Denies A/V H, denies SI/I/P. MOCA deferred today will attempt tomorrow. Inquired why she is taking Prozac, medication education provided. Discussed maintaining NPO after midnight tonight for ECT#4 tomorrow.

## 2024-04-09 NOTE — [facility] INPATIENT PSYCHIATRY PROGRESS NOTE - NSBHATTESTTYPEVISIT_PSY_A_CORE
On-site Attending with Resident/Fellow/Student and ROS (99XXX codes) On-site Attending supervising ROS (99XXX codes)

## 2024-04-10 PROCEDURE — 90870 ELECTROCONVULSIVE THERAPY: CPT

## 2024-04-10 PROCEDURE — 99232 SBSQ HOSP IP/OBS MODERATE 35: CPT | Mod: FS,25

## 2024-04-10 RX ADMIN — Medication 81 MILLIGRAM(S): at 08:45

## 2024-04-10 RX ADMIN — Medication 40 MILLIGRAM(S): at 08:45

## 2024-04-10 RX ADMIN — SENNA PLUS 2 TABLET(S): 8.6 TABLET ORAL at 21:09

## 2024-04-10 RX ADMIN — Medication 100 MILLIGRAM(S): at 21:10

## 2024-04-10 RX ADMIN — LOSARTAN POTASSIUM 50 MILLIGRAM(S): 100 TABLET, FILM COATED ORAL at 08:44

## 2024-04-10 RX ADMIN — ATORVASTATIN CALCIUM 20 MILLIGRAM(S): 80 TABLET, FILM COATED ORAL at 21:08

## 2024-04-10 RX ADMIN — Medication 10 MILLIGRAM(S): at 21:10

## 2024-04-10 RX ADMIN — Medication 10 MILLIGRAM(S): at 08:44

## 2024-04-10 RX ADMIN — BREXPIPRAZOLE 1.5 MILLIGRAM(S): 0.25 TABLET ORAL at 08:43

## 2024-04-10 RX ADMIN — AMLODIPINE BESYLATE 2.5 MILLIGRAM(S): 2.5 TABLET ORAL at 08:43

## 2024-04-10 NOTE — [facility] INPATIENT PSYCHIATRY PROGRESS NOTE - OTHER
Sitting in bed Patient walks slowly, no falls, has a walker psychomotor retardation  patient appear brighter "better" and "nervous"

## 2024-04-10 NOTE — BH INPATIENT PSYCHIATRY PROGRESS NOTE - NSBHFUPINTERVALHXFT_PSY_A_CORE
Patient seen for management of MDD and anxiety by NP and MS3. Patient interviewed in patient's room due to her being NPO and "nervous" prior to ECT session #4 today. Case discussed with care team. No acute events overnight. Patient's mood is "better" today. Upon assessment, she appears brighter. She laughs and jokes during the conversation. She reports that "I still have a strange feeling. I have to get used to it" (referring to how she felt after ECT). Her walking has improved but she still does not feel like it is at her baseline. She denies SI/I/P.     VSS. Score of 21 on MOCA today (prior to ECT).

## 2024-04-10 NOTE — BH INPATIENT PSYCHIATRY PROGRESS NOTE - PRN MEDS
MEDICATIONS  (PRN):  acetaminophen     Tablet .. 650 milliGRAM(s) Oral every 6 hours PRN Mild Pain (1 - 3), Moderate Pain (4 - 6), Severe Pain (7 - 10)  aluminum hydroxide/magnesium hydroxide/simethicone Suspension 30 milliLiter(s) Oral every 6 hours PRN Dyspepsia  bisacodyl 5 milliGRAM(s) Oral at bedtime PRN Constipation  LORazepam     Tablet 1 milliGRAM(s) Oral every 6 hours PRN Severe Anxiety/Agitation  LORazepam     Tablet 0.5 milliGRAM(s) Oral every 6 hours PRN Anxiety  LORazepam   Injectable 2 milliGRAM(s) IntraMuscular Once PRN severe agitation  magnesium hydroxide Suspension 30 milliLiter(s) Oral daily PRN Constipation   MEDICATIONS  (PRN):  acetaminophen     Tablet .. 650 milliGRAM(s) Oral every 6 hours PRN Mild Pain (1 - 3), Moderate Pain (4 - 6), Severe Pain (7 - 10)  aluminum hydroxide/magnesium hydroxide/simethicone Suspension 30 milliLiter(s) Oral every 6 hours PRN Dyspepsia  bisacodyl 5 milliGRAM(s) Oral at bedtime PRN Constipation  LORazepam     Tablet 0.5 milliGRAM(s) Oral every 6 hours PRN Anxiety  LORazepam     Tablet 1 milliGRAM(s) Oral every 6 hours PRN Severe Anxiety/Agitation  LORazepam   Injectable 2 milliGRAM(s) IntraMuscular Once PRN severe agitation  magnesium hydroxide Suspension 30 milliLiter(s) Oral daily PRN Constipation

## 2024-04-10 NOTE — BH INPATIENT PSYCHIATRY PROGRESS NOTE - NSBHMETABOLIC_PSY_ALL_CORE_FT
BMI: BMI (kg/m2): 33.6 (03-22-24 @ 12:45)  HbA1c:   Glucose:   BP: 139/78 (04-08-24 @ 18:15) (139/78 - 164/84)Vital Signs Last 24 Hrs  T(C): 37.1 (04-10-24 @ 02:06), Max: 37.1 (04-10-24 @ 02:06)  T(F): 98.7 (04-10-24 @ 02:06), Max: 98.7 (04-10-24 @ 02:06)  HR: --  BP: --  BP(mean): --  RR: 17 (04-10-24 @ 02:06) (17 - 17)  SpO2: 98% (04-10-24 @ 00:41) (98% - 98%)    Orthostatic VS  04-10-24 @ 02:06  Lying BP: --/-- HR: --  Sitting BP: 144/81 HR: 81  Standing BP: 129/82 HR: 92  Site: --  Mode: --  Orthostatic VS  04-09-24 @ 20:17  Lying BP: --/-- HR: --  Sitting BP: 139/78 HR: 92  Standing BP: 132/81 HR: 95  Site: --  Mode: --  Orthostatic VS  04-09-24 @ 07:42  Lying BP: --/-- HR: --  Sitting BP: 122/77 HR: 90  Standing BP: 110/65 HR: 108  Site: --  Mode: --  Orthostatic VS  04-08-24 @ 20:50  Lying BP: --/-- HR: --  Sitting BP: 145/64 HR: 96  Standing BP: 140/80 HR: 104  Site: --  Mode: --    Lipid Panel:  BMI: BMI (kg/m2): 33.6 (03-22-24 @ 12:45)  HbA1c:   Glucose:   BP: 135/72 (04-10-24 @ 14:51) (135/72 - 164/84)Vital Signs Last 24 Hrs  T(C): 36.7 (04-10-24 @ 14:51), Max: 37.1 (04-10-24 @ 02:06)  T(F): 98.1 (04-10-24 @ 14:51), Max: 98.7 (04-10-24 @ 02:06)  HR: 82 (04-10-24 @ 14:51) (82 - 82)  BP: 135/72 (04-10-24 @ 14:51) (135/72 - 135/72)  BP(mean): --  RR: 17 (04-10-24 @ 14:51) (17 - 17)  SpO2: 98% (04-10-24 @ 14:51) (98% - 98%)    Orthostatic VS  04-10-24 @ 02:06  Lying BP: --/-- HR: --  Sitting BP: 144/81 HR: 81  Standing BP: 129/82 HR: 92  Site: --  Mode: --  Orthostatic VS  04-09-24 @ 20:17  Lying BP: --/-- HR: --  Sitting BP: 139/78 HR: 92  Standing BP: 132/81 HR: 95  Site: --  Mode: --  Orthostatic VS  04-09-24 @ 07:42  Lying BP: --/-- HR: --  Sitting BP: 122/77 HR: 90  Standing BP: 110/65 HR: 108  Site: --  Mode: --  Orthostatic VS  04-08-24 @ 20:50  Lying BP: --/-- HR: --  Sitting BP: 145/64 HR: 96  Standing BP: 140/80 HR: 104  Site: --  Mode: --    Lipid Panel:

## 2024-04-10 NOTE — BH INPATIENT PSYCHIATRY PROGRESS NOTE - CURRENT MEDICATION
MEDICATIONS  (STANDING):  amLODIPine   Tablet 2.5 milliGRAM(s) Oral daily  aspirin  chewable 81 milliGRAM(s) Oral daily  atorvastatin 20 milliGRAM(s) Oral at bedtime  brexpiprazole 1.5 milliGRAM(s) Oral daily  busPIRone 10 milliGRAM(s) Oral two times a day  FLUoxetine 40 milliGRAM(s) Oral daily  hydrochlorothiazide 25 milliGRAM(s) Oral daily  losartan 50 milliGRAM(s) Oral daily  polyethylene glycol 3350 17 Gram(s) Oral daily  senna 2 Tablet(s) Oral at bedtime  traZODone 100 milliGRAM(s) Oral at bedtime    MEDICATIONS  (PRN):  acetaminophen     Tablet .. 650 milliGRAM(s) Oral every 6 hours PRN Mild Pain (1 - 3), Moderate Pain (4 - 6), Severe Pain (7 - 10)  aluminum hydroxide/magnesium hydroxide/simethicone Suspension 30 milliLiter(s) Oral every 6 hours PRN Dyspepsia  bisacodyl 5 milliGRAM(s) Oral at bedtime PRN Constipation  LORazepam     Tablet 1 milliGRAM(s) Oral every 6 hours PRN Severe Anxiety/Agitation  LORazepam     Tablet 0.5 milliGRAM(s) Oral every 6 hours PRN Anxiety  LORazepam   Injectable 2 milliGRAM(s) IntraMuscular Once PRN severe agitation  magnesium hydroxide Suspension 30 milliLiter(s) Oral daily PRN Constipation   MEDICATIONS  (STANDING):  amLODIPine   Tablet 2.5 milliGRAM(s) Oral daily  aspirin  chewable 81 milliGRAM(s) Oral daily  atorvastatin 20 milliGRAM(s) Oral at bedtime  brexpiprazole 1.5 milliGRAM(s) Oral daily  busPIRone 10 milliGRAM(s) Oral two times a day  FLUoxetine 40 milliGRAM(s) Oral daily  hydrochlorothiazide 25 milliGRAM(s) Oral daily  losartan 50 milliGRAM(s) Oral daily  polyethylene glycol 3350 17 Gram(s) Oral daily  senna 2 Tablet(s) Oral at bedtime  traZODone 100 milliGRAM(s) Oral at bedtime    MEDICATIONS  (PRN):  acetaminophen     Tablet .. 650 milliGRAM(s) Oral every 6 hours PRN Mild Pain (1 - 3), Moderate Pain (4 - 6), Severe Pain (7 - 10)  aluminum hydroxide/magnesium hydroxide/simethicone Suspension 30 milliLiter(s) Oral every 6 hours PRN Dyspepsia  bisacodyl 5 milliGRAM(s) Oral at bedtime PRN Constipation  LORazepam     Tablet 0.5 milliGRAM(s) Oral every 6 hours PRN Anxiety  LORazepam     Tablet 1 milliGRAM(s) Oral every 6 hours PRN Severe Anxiety/Agitation  LORazepam   Injectable 2 milliGRAM(s) IntraMuscular Once PRN severe agitation  magnesium hydroxide Suspension 30 milliLiter(s) Oral daily PRN Constipation

## 2024-04-10 NOTE — BH INPATIENT PSYCHIATRY PROGRESS NOTE - NSBHASSESSSUMMFT_PSY_ALL_CORE
69-year-old female, , no children, retired 3 years ago (was ), domiciled with  in a private residence. PPHx of Depression and LEO, engaged in outpatient tx with Dr Schmid x20+ years and therapist Cole Romero. No previous psychiatric admissions, denies previous suicide attempts, denies NSSIB. Pt denies all substance use. Pt denies legal issues and denies hx of violence/aggression. Denies access to firearms. Pertinent PMH HLD, HTN, IBS, BONIFACIO has CPAP (has not used for months). Referred by [facility] for poor functioning and inability to care for oneself due to worsening anxiety and depression. Patient reports symptoms of depression including dysphoric mood, diminished interest in activities, and fatigue/little energy that prevents her from completing ADLs. She reports recent history of 20 lb weight loss "months ago" and states that she gained back 10 lbs. Additionally reports severe anxiety that prevents her from leaving the house, states that she last left her home ~1 month ago. Denies AVH. Denies SI/I/P. Displays insight into need for pharmacologic treatment for depression/anxiety.    Today, patient appears brighter but remains isolative. Patient NPO for ECT #4 this afternoon. VSS. MOCA performed today with score 21. Denies A/V hallucinations. Denies SI/I/P.     Plan:  1. Admit to [facility], 9.13 legal status.    2. Routine observation with q 15 minute checks. Patient denies suicidality, remains in behavioral control.    3. Psych: continue home medication regimen pending collateral.  ECT #4 today  MOCA 4/10 - 21  MOCA 3/26 - 15  Prozac 40 mg daily  Rexulti 1.5 mg daily  Buspirone 10 mg BID  Trazodone 100 mg QHS  D/C Klonopin  D/C Hydroxyzine  --PRN Ativan 2 mg IM for severe agitation    4. Medical: continue home medication regimen. Labs reviewed.  Amlodipine 2.5 mg daily   Aspirin 81 mg daily  Losartan 50 mg daily  Hydrochlorothiazide 25 mg po daily  Pravastatin 20 mg po daily  B complex/vitamin C/multivitamin    5. Encourage individual, group, and milieu therapy.    6. Collaboration with outpatient psychiatrist Dr. Chester Schmid, [phone].    7. Dispo collaboration with social work. 69-year-old female, , no children, retired 3 years ago (was ), domiciled with  in a private residence. PPHx of Depression and LEO, engaged in outpatient tx with Dr Schmid x20+ years and therapist Cole Romero. No previous psychiatric admissions, denies previous suicide attempts, denies NSSIB. Pt denies all substance use. Pt denies legal issues and denies hx of violence/aggression. Denies access to firearms. Pertinent PMH HLD, HTN, IBS, BONIFACIO has CPAP (has not used for months). Referred by [facility] for poor functioning and inability to care for oneself due to worsening anxiety and depression. Patient reports symptoms of depression including dysphoric mood, diminished interest in activities, and fatigue/little energy that prevents her from completing ADLs. She reports recent history of 20 lb weight loss "months ago" and states that she gained back 10 lbs. Additionally reports severe anxiety that prevents her from leaving the house, states that she last left her home ~1 month ago. Denies AVH. Denies SI/I/P. Displays insight into need for pharmacologic treatment for depression/anxiety.    Hospital course: Patient [facility] to [facility]. Started on home psychiatric regimen of klonopin 1mg TID, rexulti 1.5mg qD, buspirone 20mg qD, hydroxyzine 10mg TID, trazodone 100 mg QHS. Started on Prozac 10mg qD, gradually increased to 40mg. Klonopin and hydroxyzine d/jacinda, buspirone decreased. ECT started on 4/16 with improvement in symptoms.     Today, patient appears brighter but remains isolative. Patient NPO for ECT #4 this afternoon. Denies A/V hallucinations. Denies SI/I/P. VSS. MOCA performed today with score 21.    Plan:  1. Admit to [facility], 9.13 legal status.    2. Routine observation with q 15 minute checks. Patient denies suicidality, remains in behavioral control.    3. Psych: continue home medication regimen pending collateral.  ECT #4 today  MOCA 4/10 - 21  MOCA 3/26 - 15  Prozac 40 mg daily  Rexulti 1.5 mg daily  Buspirone 10 mg BID  Trazodone 100 mg QHS  --PRN Ativan 2 mg IM for severe agitation    4. Medical: continue home medication regimen. Labs reviewed.  Amlodipine 2.5 mg daily   Aspirin 81 mg daily  Losartan 50 mg daily  Hydrochlorothiazide 25 mg po daily  Pravastatin 20 mg po daily  B complex/vitamin C/multivitamin    5. Encourage individual, group, and milieu therapy.    6. Collaboration with outpatient psychiatrist Dr. Chester Schmid, [phone].    7. Dispo collaboration with social work.

## 2024-04-10 NOTE — BH INPATIENT PSYCHIATRY PROGRESS NOTE - NSBHCHARTREVIEWVS_PSY_A_CORE FT
Vital Signs Last 24 Hrs  T(C): 37.1 (04-10-24 @ 02:06), Max: 37.1 (04-10-24 @ 02:06)  T(F): 98.7 (04-10-24 @ 02:06), Max: 98.7 (04-10-24 @ 02:06)  HR: --  BP: --  BP(mean): --  RR: 17 (04-10-24 @ 02:06) (17 - 17)  SpO2: 98% (04-10-24 @ 00:41) (98% - 98%)    Orthostatic VS  04-10-24 @ 02:06  Lying BP: --/-- HR: --  Sitting BP: 144/81 HR: 81  Standing BP: 129/82 HR: 92  Site: --  Mode: --  Orthostatic VS  04-09-24 @ 20:17  Lying BP: --/-- HR: --  Sitting BP: 139/78 HR: 92  Standing BP: 132/81 HR: 95  Site: --  Mode: --  Orthostatic VS  04-09-24 @ 07:42  Lying BP: --/-- HR: --  Sitting BP: 122/77 HR: 90  Standing BP: 110/65 HR: 108  Site: --  Mode: --  Orthostatic VS  04-08-24 @ 20:50  Lying BP: --/-- HR: --  Sitting BP: 145/64 HR: 96  Standing BP: 140/80 HR: 104  Site: --  Mode: --   Vital Signs Last 24 Hrs  T(C): 36.7 (04-10-24 @ 14:51), Max: 37.1 (04-10-24 @ 02:06)  T(F): 98.1 (04-10-24 @ 14:51), Max: 98.7 (04-10-24 @ 02:06)  HR: 82 (04-10-24 @ 14:51) (82 - 82)  BP: 135/72 (04-10-24 @ 14:51) (135/72 - 135/72)  BP(mean): --  RR: 17 (04-10-24 @ 14:51) (17 - 17)  SpO2: 98% (04-10-24 @ 14:51) (98% - 98%)    Orthostatic VS  04-10-24 @ 02:06  Lying BP: --/-- HR: --  Sitting BP: 144/81 HR: 81  Standing BP: 129/82 HR: 92  Site: --  Mode: --  Orthostatic VS  04-09-24 @ 20:17  Lying BP: --/-- HR: --  Sitting BP: 139/78 HR: 92  Standing BP: 132/81 HR: 95  Site: --  Mode: --  Orthostatic VS  04-09-24 @ 07:42  Lying BP: --/-- HR: --  Sitting BP: 122/77 HR: 90  Standing BP: 110/65 HR: 108  Site: --  Mode: --  Orthostatic VS  04-08-24 @ 20:50  Lying BP: --/-- HR: --  Sitting BP: 145/64 HR: 96  Standing BP: 140/80 HR: 104  Site: --  Mode: --

## 2024-04-10 NOTE — BH TREATMENT PLAN - NSCMSPTSTRENGTHS_PSY_ALL_CORE
Assertive/Compliance to treatment/Expressive of emotions Assertive/Compliance to treatment/Expressive of emotions/Supportive family

## 2024-04-11 PROCEDURE — 99232 SBSQ HOSP IP/OBS MODERATE 35: CPT | Mod: FS,25

## 2024-04-11 RX ADMIN — ATORVASTATIN CALCIUM 20 MILLIGRAM(S): 80 TABLET, FILM COATED ORAL at 20:55

## 2024-04-11 RX ADMIN — Medication 10 MILLIGRAM(S): at 20:55

## 2024-04-11 RX ADMIN — Medication 10 MILLIGRAM(S): at 09:43

## 2024-04-11 RX ADMIN — SENNA PLUS 2 TABLET(S): 8.6 TABLET ORAL at 20:55

## 2024-04-11 RX ADMIN — Medication 81 MILLIGRAM(S): at 09:43

## 2024-04-11 RX ADMIN — Medication 40 MILLIGRAM(S): at 09:43

## 2024-04-11 RX ADMIN — BREXPIPRAZOLE 1.5 MILLIGRAM(S): 0.25 TABLET ORAL at 09:42

## 2024-04-11 RX ADMIN — Medication 100 MILLIGRAM(S): at 20:57

## 2024-04-11 NOTE — BH INPATIENT PSYCHIATRY PROGRESS NOTE - CURRENT MEDICATION
MEDICATIONS  (STANDING):  amLODIPine   Tablet 2.5 milliGRAM(s) Oral daily  aspirin  chewable 81 milliGRAM(s) Oral daily  atorvastatin 20 milliGRAM(s) Oral at bedtime  brexpiprazole 1.5 milliGRAM(s) Oral daily  busPIRone 10 milliGRAM(s) Oral two times a day  FLUoxetine 40 milliGRAM(s) Oral daily  hydrochlorothiazide 25 milliGRAM(s) Oral daily  losartan 50 milliGRAM(s) Oral daily  polyethylene glycol 3350 17 Gram(s) Oral daily  senna 2 Tablet(s) Oral at bedtime  traZODone 100 milliGRAM(s) Oral at bedtime    MEDICATIONS  (PRN):  acetaminophen     Tablet .. 650 milliGRAM(s) Oral every 6 hours PRN Mild Pain (1 - 3), Moderate Pain (4 - 6), Severe Pain (7 - 10)  aluminum hydroxide/magnesium hydroxide/simethicone Suspension 30 milliLiter(s) Oral every 6 hours PRN Dyspepsia  bisacodyl 5 milliGRAM(s) Oral at bedtime PRN Constipation  LORazepam     Tablet 1 milliGRAM(s) Oral every 6 hours PRN Severe Anxiety/Agitation  LORazepam     Tablet 0.5 milliGRAM(s) Oral every 6 hours PRN Anxiety  LORazepam   Injectable 2 milliGRAM(s) IntraMuscular Once PRN severe agitation  magnesium hydroxide Suspension 30 milliLiter(s) Oral daily PRN Constipation   MEDICATIONS  (STANDING):  amLODIPine   Tablet 2.5 milliGRAM(s) Oral daily  aspirin  chewable 81 milliGRAM(s) Oral daily  atorvastatin 20 milliGRAM(s) Oral at bedtime  busPIRone 10 milliGRAM(s) Oral two times a day  FLUoxetine 40 milliGRAM(s) Oral daily  hydrochlorothiazide 25 milliGRAM(s) Oral daily  losartan 50 milliGRAM(s) Oral daily  polyethylene glycol 3350 17 Gram(s) Oral daily  senna 2 Tablet(s) Oral at bedtime  traZODone 100 milliGRAM(s) Oral at bedtime    MEDICATIONS  (PRN):  acetaminophen     Tablet .. 650 milliGRAM(s) Oral every 6 hours PRN Mild Pain (1 - 3), Moderate Pain (4 - 6), Severe Pain (7 - 10)  aluminum hydroxide/magnesium hydroxide/simethicone Suspension 30 milliLiter(s) Oral every 6 hours PRN Dyspepsia  bisacodyl 5 milliGRAM(s) Oral at bedtime PRN Constipation  LORazepam     Tablet 0.5 milliGRAM(s) Oral every 6 hours PRN Anxiety  LORazepam     Tablet 1 milliGRAM(s) Oral every 6 hours PRN Severe Anxiety/Agitation  LORazepam   Injectable 2 milliGRAM(s) IntraMuscular Once PRN severe agitation  magnesium hydroxide Suspension 30 milliLiter(s) Oral daily PRN Constipation

## 2024-04-11 NOTE — BH INPATIENT PSYCHIATRY PROGRESS NOTE - NSBHCHARTREVIEWVS_PSY_A_CORE FT
Vital Signs Last 24 Hrs  T(C): 36.5 (04-11-24 @ 07:23), Max: 36.7 (04-10-24 @ 14:34)  T(F): 97.7 (04-11-24 @ 07:23), Max: 98.1 (04-10-24 @ 14:34)  HR: 90 (04-10-24 @ 15:35) (82 - 122)  BP: 149/83 (04-10-24 @ 20:51) (135/72 - 154/87)  BP(mean): 98 (04-10-24 @ 20:51) (87 - 98)  RR: 18 (04-11-24 @ 07:23) (17 - 18)  SpO2: 99% (04-11-24 @ 07:23) (97% - 99%)    Orthostatic VS  04-11-24 @ 07:23  Lying BP: --/-- HR: --  Sitting BP: 109/70 HR: 86  Standing BP: 106/68 HR: 87  Site: --  Mode: --  Orthostatic VS  04-10-24 @ 02:06  Lying BP: --/-- HR: --  Sitting BP: 144/81 HR: 81  Standing BP: 129/82 HR: 92  Site: --  Mode: --  Orthostatic VS  04-09-24 @ 20:17  Lying BP: --/-- HR: --  Sitting BP: 139/78 HR: 92  Standing BP: 132/81 HR: 95  Site: --  Mode: --   Vital Signs Last 24 Hrs  T(C): 36.5 (04-11-24 @ 07:23), Max: 36.5 (04-11-24 @ 07:23)  T(F): 97.7 (04-11-24 @ 07:23), Max: 97.7 (04-11-24 @ 07:23)  HR: --  BP: 149/83 (04-10-24 @ 20:51) (149/83 - 149/83)  BP(mean): 98 (04-10-24 @ 20:51) (98 - 98)  RR: 18 (04-11-24 @ 07:23) (18 - 18)  SpO2: 99% (04-11-24 @ 07:23) (97% - 99%)    Orthostatic VS  04-11-24 @ 07:23  Lying BP: --/-- HR: --  Sitting BP: 109/70 HR: 86  Standing BP: 106/68 HR: 87  Site: --  Mode: --  Orthostatic VS  04-10-24 @ 02:06  Lying BP: --/-- HR: --  Sitting BP: 144/81 HR: 81  Standing BP: 129/82 HR: 92  Site: --  Mode: --  Orthostatic VS  04-09-24 @ 20:17  Lying BP: --/-- HR: --  Sitting BP: 139/78 HR: 92  Standing BP: 132/81 HR: 95  Site: --  Mode: --   Vital Signs Last 24 Hrs  T(C): 36.7 (04-11-24 @ 19:41), Max: 36.7 (04-11-24 @ 19:41)  T(F): 98 (04-11-24 @ 19:41), Max: 98 (04-11-24 @ 19:41)  HR: --  BP: 149/83 (04-10-24 @ 20:51) (149/83 - 149/83)  BP(mean): 98 (04-10-24 @ 20:51) (98 - 98)  RR: 18 (04-11-24 @ 07:23) (18 - 18)  SpO2: 99% (04-11-24 @ 07:23) (97% - 99%)    Orthostatic VS  04-11-24 @ 19:41  Lying BP: --/-- HR: --  Sitting BP: 99/66 HR: 110  Standing BP: 101/67 HR: 114  Site: --  Mode: --  Orthostatic VS  04-11-24 @ 07:23  Lying BP: --/-- HR: --  Sitting BP: 109/70 HR: 86  Standing BP: 106/68 HR: 87  Site: --  Mode: --  Orthostatic VS  04-10-24 @ 02:06  Lying BP: --/-- HR: --  Sitting BP: 144/81 HR: 81  Standing BP: 129/82 HR: 92  Site: --  Mode: --

## 2024-04-11 NOTE — BH INPATIENT PSYCHIATRY PROGRESS NOTE - NSBHASSESSSUMMFT_PSY_ALL_CORE
69-year-old female, , no children, retired 3 years ago (was ), domiciled with  in a private residence. PPHx of Depression and LEO, engaged in outpatient tx with Dr Schmid x20+ years and therapist Cole Romero. No previous psychiatric admissions, denies previous suicide attempts, denies NSSIB. Pt denies all substance use. Pt denies legal issues and denies hx of violence/aggression. Denies access to firearms. Pertinent PMH HLD, HTN, IBS, BONIFACIO has CPAP (has not used for months). Referred by [facility] for poor functioning and inability to care for oneself due to worsening anxiety and depression. Patient reports symptoms of depression including dysphoric mood, diminished interest in activities, and fatigue/little energy that prevents her from completing ADLs. She reports recent history of 20 lb weight loss "months ago" and states that she gained back 10 lbs. Additionally reports severe anxiety that prevents her from leaving the house, states that she last left her home ~1 month ago. Denies AVH. Denies SI/I/P. Displays insight into need for pharmacologic treatment for depression/anxiety.    Hospital course: Patient admitted to [facility]. Started on home psychiatric regimen of klonopin 1mg TID, rexulti 1.5mg qD, buspirone 20mg qD, hydroxyzine 10mg TID, trazodone 100 mg QHS. Started on Prozac 10mg qD, gradually increased to 40mg. Klonopin and hydroxyzine d/jacinda, buspirone decreased. ECT started on 4/16 with improvement in symptoms. MOCA on 3/26 (prior to ECT #1) - 15, MOCA on 4/10 (prior to ECT #4) - 21.     Today, patient appears brighter. Her mood is "okay" and "anxious" but reports feeling improvement from admission. Endorses brain fog related to ECT and shakiness. ECT #5 tomorrow. Amlodipine held for low BP. Denies A/V hallucinations. Denies SI/I/P.     Plan:  1. Admit to [facility], 9.13 legal status.    2. Routine observation with q 15 minute checks. Patient denies suicidality, remains in behavioral control.    3. Psych: continue home medication regimen pending collateral.  ECT #4 today  MOCA 4/10 - 21  MOCA 3/26 - 15  Prozac 40 mg daily  Rexulti 1.5 mg daily  Buspirone 10 mg BID  Trazodone 100 mg QHS  --PRN Ativan 2 mg IM for severe agitation    4. Medical: continue home medication regimen. Labs reviewed.  Amlodipine 2.5 mg daily   Aspirin 81 mg daily  Losartan 50 mg daily  Hydrochlorothiazide 25 mg po daily  Pravastatin 20 mg po daily  B complex/vitamin C/multivitamin    5. Encourage individual, group, and milieu therapy.    6. Collaboration with outpatient psychiatrist Dr. Chester Schmid, [phone].    7. Dispo collaboration with social work.   69-year-old female, , no children, retired 3 years ago (was ), domiciled with  in a private residence. PPHx of Depression and LEO, engaged in outpatient tx with Dr Schmid x20+ years and therapist Cole Romero. No previous psychiatric admissions, denies previous suicide attempts, denies NSSIB. Pt denies all substance use. Pt denies legal issues and denies hx of violence/aggression. Denies access to firearms. Pertinent PMH HLD, HTN, IBS, BONIFACIO has CPAP (has not used for months). Referred by [facility] for poor functioning and inability to care for oneself due to worsening anxiety and depression. Patient reports symptoms of depression including dysphoric mood, diminished interest in activities, and fatigue/little energy that prevents her from completing ADLs. She reports recent history of 20 lb weight loss "months ago" and states that she gained back 10 lbs. Additionally reports severe anxiety that prevents her from leaving the house, states that she last left her home ~1 month ago. Denies AVH. Denies SI/I/P. Displays insight into need for pharmacologic treatment for depression/anxiety.    Hospital course: Patient admitted to [facility]. Started on home psychiatric regimen of klonopin 1mg TID, rexulti 1.5mg qD, buspirone 20mg qD, hydroxyzine 10mg TID, trazodone 100 mg QHS. Started on Prozac 10mg qD, gradually increased to 40mg. Klonopin and hydroxyzine d/jacinda, buspirone decreased. ECT started on 4/16 with improvement in symptoms. MOCA on 3/26 (prior to ECT #1) - 15, MOCA on 4/10 (prior to ECT #4) - 21.     Today, patient appears brighter. Her mood is "okay" and "anxious" but reports feeling improvement from admission. Endorses brain fog related to ECT and shakiness. ECT #5 tomorrow. Amlodipine held for low BP. Denies A/V hallucinations. Denies SI/I/P.     Plan:  1. Admit to [facility], 9.13 legal status.    2. Routine observation with q 15 minute checks. Patient denies suicidality, remains in behavioral control.    3. Psych: continue home medication regimen pending collateral.  ECT #4 today  MOCA 4/10 - 21  MOCA 3/26 - 15  Prozac 40 mg daily  D/C Rexulti  Buspirone 10 mg BID  Trazodone 100 mg QHS  --PRN Ativan 2 mg IM for severe agitation    4. Medical: continue home medication regimen. Labs reviewed.  Amlodipine 2.5 mg daily   Aspirin 81 mg daily  Losartan 50 mg daily  Hydrochlorothiazide 25 mg po daily  Pravastatin 20 mg po daily  B complex/vitamin C/multivitamin    5. Encourage individual, group, and milieu therapy.    6. Collaboration with outpatient psychiatrist Dr. Chester Schmid, [phone].    7. Dispo collaboration with social work.

## 2024-04-11 NOTE — BH INPATIENT PSYCHIATRY PROGRESS NOTE - NSBHMSESPABN_PSY_A_CORE
Soft volume/Decreased productivity
Soft volume
Soft volume/Decreased productivity
Soft volume/Decreased productivity
Soft volume/Slowed rate/Decreased productivity
Soft volume/Decreased productivity
Soft volume
Soft volume/Slowed rate/Decreased productivity

## 2024-04-11 NOTE — BH INPATIENT PSYCHIATRY PROGRESS NOTE - NSBHMETABOLIC_PSY_ALL_CORE_FT
BMI: BMI (kg/m2): 33.6 (03-22-24 @ 12:45)  HbA1c:   Glucose:   BP: 149/83 (04-10-24 @ 20:51) (135/72 - 164/84)Vital Signs Last 24 Hrs  T(C): 36.5 (04-11-24 @ 07:23), Max: 36.7 (04-10-24 @ 14:34)  T(F): 97.7 (04-11-24 @ 07:23), Max: 98.1 (04-10-24 @ 14:34)  HR: 90 (04-10-24 @ 15:35) (82 - 122)  BP: 149/83 (04-10-24 @ 20:51) (135/72 - 154/87)  BP(mean): 98 (04-10-24 @ 20:51) (87 - 98)  RR: 18 (04-11-24 @ 07:23) (17 - 18)  SpO2: 99% (04-11-24 @ 07:23) (97% - 99%)    Orthostatic VS  04-11-24 @ 07:23  Lying BP: --/-- HR: --  Sitting BP: 109/70 HR: 86  Standing BP: 106/68 HR: 87  Site: --  Mode: --  Orthostatic VS  04-10-24 @ 02:06  Lying BP: --/-- HR: --  Sitting BP: 144/81 HR: 81  Standing BP: 129/82 HR: 92  Site: --  Mode: --  Orthostatic VS  04-09-24 @ 20:17  Lying BP: --/-- HR: --  Sitting BP: 139/78 HR: 92  Standing BP: 132/81 HR: 95  Site: --  Mode: --    Lipid Panel:  BMI: BMI (kg/m2): 33.6 (03-22-24 @ 12:45)  HbA1c:   Glucose:   BP: 149/83 (04-10-24 @ 20:51) (135/72 - 164/84)Vital Signs Last 24 Hrs  T(C): 36.5 (04-11-24 @ 07:23), Max: 36.5 (04-11-24 @ 07:23)  T(F): 97.7 (04-11-24 @ 07:23), Max: 97.7 (04-11-24 @ 07:23)  HR: --  BP: 149/83 (04-10-24 @ 20:51) (149/83 - 149/83)  BP(mean): 98 (04-10-24 @ 20:51) (98 - 98)  RR: 18 (04-11-24 @ 07:23) (18 - 18)  SpO2: 99% (04-11-24 @ 07:23) (97% - 99%)    Orthostatic VS  04-11-24 @ 07:23  Lying BP: --/-- HR: --  Sitting BP: 109/70 HR: 86  Standing BP: 106/68 HR: 87  Site: --  Mode: --  Orthostatic VS  04-10-24 @ 02:06  Lying BP: --/-- HR: --  Sitting BP: 144/81 HR: 81  Standing BP: 129/82 HR: 92  Site: --  Mode: --  Orthostatic VS  04-09-24 @ 20:17  Lying BP: --/-- HR: --  Sitting BP: 139/78 HR: 92  Standing BP: 132/81 HR: 95  Site: --  Mode: --    Lipid Panel:  BMI: BMI (kg/m2): 33.6 (03-22-24 @ 12:45)  HbA1c:   Glucose:   BP: 149/83 (04-10-24 @ 20:51) (135/72 - 154/87)Vital Signs Last 24 Hrs  T(C): 36.7 (04-11-24 @ 19:41), Max: 36.7 (04-11-24 @ 19:41)  T(F): 98 (04-11-24 @ 19:41), Max: 98 (04-11-24 @ 19:41)  HR: --  BP: 149/83 (04-10-24 @ 20:51) (149/83 - 149/83)  BP(mean): 98 (04-10-24 @ 20:51) (98 - 98)  RR: 18 (04-11-24 @ 07:23) (18 - 18)  SpO2: 99% (04-11-24 @ 07:23) (97% - 99%)    Orthostatic VS  04-11-24 @ 19:41  Lying BP: --/-- HR: --  Sitting BP: 99/66 HR: 110  Standing BP: 101/67 HR: 114  Site: --  Mode: --  Orthostatic VS  04-11-24 @ 07:23  Lying BP: --/-- HR: --  Sitting BP: 109/70 HR: 86  Standing BP: 106/68 HR: 87  Site: --  Mode: --  Orthostatic VS  04-10-24 @ 02:06  Lying BP: --/-- HR: --  Sitting BP: 144/81 HR: 81  Standing BP: 129/82 HR: 92  Site: --  Mode: --    Lipid Panel:

## 2024-04-11 NOTE — BH INPATIENT PSYCHIATRY PROGRESS NOTE - NSBHFUPINTERVALHXFT_PSY_A_CORE
Patient seen for management of MDD and anxiety by NP/NP student/MS3 in day room. Case discussed with care team. No acute events overnight. Amlodipine held for DBP<70 this morning. Patient had ECT #4 yesterday, reports that it was "very good". Her mood is "okay", she says "I don't feel good, I don't want to do anything." She denies feeling depressed, endorses some anxiety. She feels that overall she is not as depressed and anxious as when she first arrived. She additionally feels like her walking has gotten better. However, she denies feeling improvement with ECT thus far. Her concern is primarily brain fog that she experiences after ECT, says "I don't feel like myself". She also notes feeling shaky and is unsure if it is due to the room being cold or her medications. Notably, patient continues to appear brighter with full range of affect and willing to have the interview outside of her room today. Denies AV hallucinations, denies SI/I/P.

## 2024-04-12 PROCEDURE — 99232 SBSQ HOSP IP/OBS MODERATE 35: CPT | Mod: FS,25

## 2024-04-12 PROCEDURE — 90870 ELECTROCONVULSIVE THERAPY: CPT

## 2024-04-12 RX ADMIN — Medication 10 MILLIGRAM(S): at 21:20

## 2024-04-12 RX ADMIN — Medication 40 MILLIGRAM(S): at 09:37

## 2024-04-12 RX ADMIN — ATORVASTATIN CALCIUM 20 MILLIGRAM(S): 80 TABLET, FILM COATED ORAL at 21:19

## 2024-04-12 RX ADMIN — Medication 81 MILLIGRAM(S): at 09:37

## 2024-04-12 RX ADMIN — AMLODIPINE BESYLATE 2.5 MILLIGRAM(S): 2.5 TABLET ORAL at 09:36

## 2024-04-12 RX ADMIN — Medication 10 MILLIGRAM(S): at 09:37

## 2024-04-12 RX ADMIN — LOSARTAN POTASSIUM 50 MILLIGRAM(S): 100 TABLET, FILM COATED ORAL at 09:36

## 2024-04-12 RX ADMIN — SENNA PLUS 2 TABLET(S): 8.6 TABLET ORAL at 21:19

## 2024-04-12 RX ADMIN — Medication 100 MILLIGRAM(S): at 21:19

## 2024-04-12 NOTE — BH INPATIENT PSYCHIATRY PROGRESS NOTE - NSBHCHARTREVIEWVS_PSY_A_CORE FT
Vital Signs Last 24 Hrs  T(C): 36.8 (04-12-24 @ 07:42), Max: 36.8 (04-12-24 @ 07:42)  T(F): 98.3 (04-12-24 @ 07:42), Max: 98.3 (04-12-24 @ 07:42)  HR: --  BP: --  BP(mean): --  RR: 18 (04-12-24 @ 07:42) (18 - 18)  SpO2: 100% (04-12-24 @ 02:14) (100% - 100%)    Orthostatic VS  04-12-24 @ 07:42  Lying BP: --/-- HR: --  Sitting BP: 140/73 HR: 85  Standing BP: 134/76 HR: 96  Site: --  Mode: --  Orthostatic VS  04-11-24 @ 20:26  Lying BP: --/-- HR: --  Sitting BP: 142/82 HR: 80  Standing BP: 126/78 HR: 77  Site: --  Mode: --  Orthostatic VS  04-11-24 @ 19:41  Lying BP: --/-- HR: --  Sitting BP: 99/66 HR: 110  Standing BP: 101/67 HR: 114  Site: --  Mode: --  Orthostatic VS  04-11-24 @ 07:23  Lying BP: --/-- HR: --  Sitting BP: 109/70 HR: 86  Standing BP: 106/68 HR: 87  Site: --  Mode: --   Vital Signs Last 24 Hrs  T(C): 36.8 (04-12-24 @ 14:05), Max: 36.8 (04-12-24 @ 07:42)  T(F): 98.2 (04-12-24 @ 14:05), Max: 98.3 (04-12-24 @ 07:42)  HR: 71 (04-12-24 @ 14:05) (71 - 110)  BP: 144/84 (04-12-24 @ 14:05) (113/60 - 152/82)  BP(mean): --  RR: 17 (04-12-24 @ 14:05) (16 - 21)  SpO2: 97% (04-12-24 @ 14:05) (95% - 100%)    Orthostatic VS  04-12-24 @ 07:42  Lying BP: --/-- HR: --  Sitting BP: 140/73 HR: 85  Standing BP: 134/76 HR: 96  Site: --  Mode: --  Orthostatic VS  04-11-24 @ 20:26  Lying BP: --/-- HR: --  Sitting BP: 142/82 HR: 80  Standing BP: 126/78 HR: 77  Site: --  Mode: --  Orthostatic VS  04-11-24 @ 19:41  Lying BP: --/-- HR: --  Sitting BP: 99/66 HR: 110  Standing BP: 101/67 HR: 114  Site: --  Mode: --  Orthostatic VS  04-11-24 @ 07:23  Lying BP: --/-- HR: --  Sitting BP: 109/70 HR: 86  Standing BP: 106/68 HR: 87  Site: --  Mode: --

## 2024-04-12 NOTE — BH INPATIENT PSYCHIATRY PROGRESS NOTE - NSBHASSESSSUMMFT_PSY_ALL_CORE
69-year-old female, , no children, retired 3 years ago (was ), domiciled with  in a private residence. PPHx of Depression and LEO, engaged in outpatient tx with Dr Schmid x20+ years and therapist Cole Romero. No previous psychiatric admissions, denies previous suicide attempts, denies NSSIB. Pt denies all substance use. Pt denies legal issues and denies hx of violence/aggression. Denies access to firearms. Pertinent PMH HLD, HTN, IBS, BONIFACIO has CPAP (has not used for months). Referred by [facility] for poor functioning and inability to care for oneself due to worsening anxiety and depression. Patient reports symptoms of depression including dysphoric mood, diminished interest in activities, and fatigue/little energy that prevents her from completing ADLs. She reports recent history of 20 lb weight loss "months ago" and states that she gained back 10 lbs. Additionally reports severe anxiety that prevents her from leaving the house, states that she last left her home ~1 month ago. Denies AVH. Denies SI/I/P. Displays insight into need for pharmacologic treatment for depression/anxiety.    Hospital course: Patient admitted to [facility]. Started on home psychiatric regimen of klonopin 1mg TID, rexulti 1.5mg qD, buspirone 20mg qD, hydroxyzine 10mg TID, trazodone 100 mg QHS. Started on Prozac 10mg qD, gradually increased to 40mg. Klonopin and hydroxyzine d/jacinda, buspirone decreased. ECT started on 4/16 with improvement in symptoms. Rexulti d/jacinda 4/11 due to side effects (shakiness). MOCA on 3/26 (prior to ECT #1) - 15, MOCA on 4/10 (prior to ECT #4) - 21.     Today, patient continues to appear brighter. Her mood is "semi-good" and she endorses feeling improvement from admission. VSS. ECT #5 today. Denies A/V hallucinations. Denies SI/I/P.     Plan:  1. Admit to [facility], 9.13 legal status.    2. Routine observation with q 15 minute checks. Patient denies suicidality, remains in behavioral control.    3. Psych: continue home medication regimen pending collateral.  ECT #5 today  MOCA 4/10 - 21  MOCA 3/26 - 15  Prozac 40 mg daily  Buspirone 10 mg BID  Trazodone 100 mg QHS  D/C Rexulti  --PRN Ativan 2 mg IM for severe agitation    4. Medical: continue home medication regimen. Labs reviewed.  Amlodipine 2.5 mg daily   Aspirin 81 mg daily  Losartan 50 mg daily  Hydrochlorothiazide 25 mg po daily  Pravastatin 20 mg po daily  B complex/vitamin C/multivitamin    5. Encourage individual, group, and milieu therapy.    6. Collaboration with outpatient psychiatrist Dr. Chester Schmid, [phone].    7. Dispo collaboration with social work.

## 2024-04-12 NOTE — BH INPATIENT PSYCHIATRY PROGRESS NOTE - NSBHFUPINTERVALHXFT_PSY_A_CORE
Patient seen for management of MDD and anxiety by NP/pharmacist/MS3 in group room. Case discussed with care team. VSS. Patient NPO for ECT #5 today. No acute events overnight. Her mood today is "semi-good" and is starting to notice some improvement in her mood. She continues to appear brighter. She does not feel depressed, denies anxiety today. She feels her walking continues to improve. Her  has been visiting her and was here last night. She reports that they discuss daily events when he visits. She has not been attending group sessions, says "I'm not into it". Sleep and appetite adequate. Patient has been intermittently constipated during her admission, last BM 3 days ago. Will address following ECT session this afternoon. Denies shakiness today after coming off Rexulti. Denies AV hallucinations, denies SI/I/P.  Patient seen for management of MDD and anxiety by NP/pharmacist/MS3 in group room. Case discussed with care team. VSS. Patient NPO for ECT #5 today. No acute events overnight. Her mood today is "semi-good" and is starting to notice some improvement in her mood. She continues to appear brighter. She does not feel depressed, denies anxiety today. She feels her walking continues to improve. Her  has been visiting her and was here last night. She reports that they discuss daily events when he visits. She has not been attending group sessions, says "I'm not into it". Sleep and appetite adequate. Patient has been intermittently constipated during her admission, last BM 3 days ago. denied abdominal pain or distress. Will address following ECT session this afternoon. Denies shakiness today after coming off Rexulti. Denies AV hallucinations, denies SI/I/P.

## 2024-04-12 NOTE — BH INPATIENT PSYCHIATRY PROGRESS NOTE - NSBHMETABOLIC_PSY_ALL_CORE_FT
BMI: BMI (kg/m2): 33.6 (03-22-24 @ 12:45)  HbA1c:   Glucose:   BP: 149/83 (04-10-24 @ 20:51) (135/72 - 154/87)Vital Signs Last 24 Hrs  T(C): 36.8 (04-12-24 @ 07:42), Max: 36.8 (04-12-24 @ 07:42)  T(F): 98.3 (04-12-24 @ 07:42), Max: 98.3 (04-12-24 @ 07:42)  HR: --  BP: --  BP(mean): --  RR: 18 (04-12-24 @ 07:42) (18 - 18)  SpO2: 100% (04-12-24 @ 02:14) (100% - 100%)    Orthostatic VS  04-12-24 @ 07:42  Lying BP: --/-- HR: --  Sitting BP: 140/73 HR: 85  Standing BP: 134/76 HR: 96  Site: --  Mode: --  Orthostatic VS  04-11-24 @ 20:26  Lying BP: --/-- HR: --  Sitting BP: 142/82 HR: 80  Standing BP: 126/78 HR: 77  Site: --  Mode: --  Orthostatic VS  04-11-24 @ 19:41  Lying BP: --/-- HR: --  Sitting BP: 99/66 HR: 110  Standing BP: 101/67 HR: 114  Site: --  Mode: --  Orthostatic VS  04-11-24 @ 07:23  Lying BP: --/-- HR: --  Sitting BP: 109/70 HR: 86  Standing BP: 106/68 HR: 87  Site: --  Mode: --    Lipid Panel:  BMI: BMI (kg/m2): 33.6 (03-22-24 @ 12:45)  HbA1c:   Glucose:   BP: 144/84 (04-12-24 @ 14:05) (113/60 - 154/87)Vital Signs Last 24 Hrs  T(C): 36.8 (04-12-24 @ 14:05), Max: 36.8 (04-12-24 @ 07:42)  T(F): 98.2 (04-12-24 @ 14:05), Max: 98.3 (04-12-24 @ 07:42)  HR: 71 (04-12-24 @ 14:05) (71 - 110)  BP: 144/84 (04-12-24 @ 14:05) (113/60 - 152/82)  BP(mean): --  RR: 17 (04-12-24 @ 14:05) (16 - 21)  SpO2: 97% (04-12-24 @ 14:05) (95% - 100%)    Orthostatic VS  04-12-24 @ 07:42  Lying BP: --/-- HR: --  Sitting BP: 140/73 HR: 85  Standing BP: 134/76 HR: 96  Site: --  Mode: --  Orthostatic VS  04-11-24 @ 20:26  Lying BP: --/-- HR: --  Sitting BP: 142/82 HR: 80  Standing BP: 126/78 HR: 77  Site: --  Mode: --  Orthostatic VS  04-11-24 @ 19:41  Lying BP: --/-- HR: --  Sitting BP: 99/66 HR: 110  Standing BP: 101/67 HR: 114  Site: --  Mode: --  Orthostatic VS  04-11-24 @ 07:23  Lying BP: --/-- HR: --  Sitting BP: 109/70 HR: 86  Standing BP: 106/68 HR: 87  Site: --  Mode: --    Lipid Panel:

## 2024-04-12 NOTE — BH INPATIENT PSYCHIATRY PROGRESS NOTE - CURRENT MEDICATION
MEDICATIONS  (STANDING):  amLODIPine   Tablet 2.5 milliGRAM(s) Oral daily  aspirin  chewable 81 milliGRAM(s) Oral daily  atorvastatin 20 milliGRAM(s) Oral at bedtime  busPIRone 10 milliGRAM(s) Oral two times a day  FLUoxetine 40 milliGRAM(s) Oral daily  hydrochlorothiazide 25 milliGRAM(s) Oral daily  losartan 50 milliGRAM(s) Oral daily  polyethylene glycol 3350 17 Gram(s) Oral daily  senna 2 Tablet(s) Oral at bedtime  traZODone 100 milliGRAM(s) Oral at bedtime    MEDICATIONS  (PRN):  acetaminophen     Tablet .. 650 milliGRAM(s) Oral every 6 hours PRN Mild Pain (1 - 3), Moderate Pain (4 - 6), Severe Pain (7 - 10)  aluminum hydroxide/magnesium hydroxide/simethicone Suspension 30 milliLiter(s) Oral every 6 hours PRN Dyspepsia  bisacodyl 5 milliGRAM(s) Oral at bedtime PRN Constipation  LORazepam     Tablet 0.5 milliGRAM(s) Oral every 6 hours PRN Anxiety  LORazepam     Tablet 1 milliGRAM(s) Oral every 6 hours PRN Severe Anxiety/Agitation  LORazepam   Injectable 2 milliGRAM(s) IntraMuscular Once PRN severe agitation  magnesium hydroxide Suspension 30 milliLiter(s) Oral daily PRN Constipation   MEDICATIONS  (STANDING):  amLODIPine   Tablet 2.5 milliGRAM(s) Oral daily  aspirin  chewable 81 milliGRAM(s) Oral daily  atorvastatin 20 milliGRAM(s) Oral at bedtime  busPIRone 10 milliGRAM(s) Oral two times a day  FLUoxetine 40 milliGRAM(s) Oral daily  hydrochlorothiazide 25 milliGRAM(s) Oral daily  losartan 50 milliGRAM(s) Oral daily  polyethylene glycol 3350 17 Gram(s) Oral daily  senna 2 Tablet(s) Oral at bedtime  traZODone 100 milliGRAM(s) Oral at bedtime    MEDICATIONS  (PRN):  acetaminophen     Tablet .. 650 milliGRAM(s) Oral every 6 hours PRN Mild Pain (1 - 3), Moderate Pain (4 - 6), Severe Pain (7 - 10)  aluminum hydroxide/magnesium hydroxide/simethicone Suspension 30 milliLiter(s) Oral every 6 hours PRN Dyspepsia  bisacodyl 5 milliGRAM(s) Oral at bedtime PRN Constipation  LORazepam     Tablet 1 milliGRAM(s) Oral every 6 hours PRN Severe Anxiety/Agitation  LORazepam     Tablet 0.5 milliGRAM(s) Oral every 6 hours PRN Anxiety  LORazepam   Injectable 2 milliGRAM(s) IntraMuscular Once PRN severe agitation  magnesium hydroxide Suspension 30 milliLiter(s) Oral daily PRN Constipation

## 2024-04-12 NOTE — ECT PRE-PROCEDURE CHECKLIST - NSPTSLEEPAPNEA_PSY_ALL_CORE
h/o sleep apnea, continue to refuse cpap machine at night ,pulse ox % room air ./yes (notify anesthesia)

## 2024-04-13 RX ADMIN — ATORVASTATIN CALCIUM 20 MILLIGRAM(S): 80 TABLET, FILM COATED ORAL at 21:33

## 2024-04-13 RX ADMIN — Medication 81 MILLIGRAM(S): at 08:52

## 2024-04-13 RX ADMIN — LOSARTAN POTASSIUM 50 MILLIGRAM(S): 100 TABLET, FILM COATED ORAL at 08:52

## 2024-04-13 RX ADMIN — AMLODIPINE BESYLATE 2.5 MILLIGRAM(S): 2.5 TABLET ORAL at 08:53

## 2024-04-13 RX ADMIN — Medication 40 MILLIGRAM(S): at 08:53

## 2024-04-13 RX ADMIN — Medication 10 MILLIGRAM(S): at 21:33

## 2024-04-13 RX ADMIN — Medication 10 MILLIGRAM(S): at 08:53

## 2024-04-13 RX ADMIN — Medication 100 MILLIGRAM(S): at 21:33

## 2024-04-13 RX ADMIN — SENNA PLUS 2 TABLET(S): 8.6 TABLET ORAL at 21:33

## 2024-04-14 RX ADMIN — SENNA PLUS 2 TABLET(S): 8.6 TABLET ORAL at 20:22

## 2024-04-14 RX ADMIN — Medication 10 MILLIGRAM(S): at 08:55

## 2024-04-14 RX ADMIN — AMLODIPINE BESYLATE 2.5 MILLIGRAM(S): 2.5 TABLET ORAL at 08:57

## 2024-04-14 RX ADMIN — Medication 81 MILLIGRAM(S): at 08:56

## 2024-04-14 RX ADMIN — Medication 10 MILLIGRAM(S): at 20:22

## 2024-04-14 RX ADMIN — LOSARTAN POTASSIUM 50 MILLIGRAM(S): 100 TABLET, FILM COATED ORAL at 08:57

## 2024-04-14 RX ADMIN — Medication 40 MILLIGRAM(S): at 08:56

## 2024-04-14 RX ADMIN — ATORVASTATIN CALCIUM 20 MILLIGRAM(S): 80 TABLET, FILM COATED ORAL at 20:22

## 2024-04-14 RX ADMIN — Medication 100 MILLIGRAM(S): at 20:22

## 2024-04-15 PROCEDURE — 90870 ELECTROCONVULSIVE THERAPY: CPT

## 2024-04-15 PROCEDURE — 99232 SBSQ HOSP IP/OBS MODERATE 35: CPT | Mod: FS,25

## 2024-04-15 RX ADMIN — Medication 81 MILLIGRAM(S): at 09:08

## 2024-04-15 RX ADMIN — LOSARTAN POTASSIUM 50 MILLIGRAM(S): 100 TABLET, FILM COATED ORAL at 09:08

## 2024-04-15 RX ADMIN — Medication 40 MILLIGRAM(S): at 09:07

## 2024-04-15 RX ADMIN — Medication 10 MILLIGRAM(S): at 21:34

## 2024-04-15 RX ADMIN — AMLODIPINE BESYLATE 2.5 MILLIGRAM(S): 2.5 TABLET ORAL at 09:07

## 2024-04-15 RX ADMIN — Medication 10 MILLIGRAM(S): at 09:08

## 2024-04-15 RX ADMIN — Medication 100 MILLIGRAM(S): at 21:35

## 2024-04-15 RX ADMIN — ATORVASTATIN CALCIUM 20 MILLIGRAM(S): 80 TABLET, FILM COATED ORAL at 21:35

## 2024-04-15 RX ADMIN — SENNA PLUS 2 TABLET(S): 8.6 TABLET ORAL at 21:35

## 2024-04-15 NOTE — BH SCALES AND SCREENS - NSMOCAMEMORY_PSY_ALL_CORE
Unable to perform any task in this section successfully
Trial #1: completed successfully/Trial #2: completed successfully
Trial #1: completed successfully/Trial #2: completed successfully

## 2024-04-15 NOTE — BH SCALES AND SCREENS - NSMOCAABSTRAC_PSY_ALL_CORE
Stated similarity between a Train and Bicycle/Stated similarity between a Watch and Ruler

## 2024-04-15 NOTE — BH SCALES AND SCREENS - NSMOCAEDUCYRS_PSY_ALL_CORE
0 = Greater than 12 years of education

## 2024-04-15 NOTE — BH SCALES AND SCREENS - NSMOCAATTEN_PSY_ALL_CORE
Repeated "742" in backward order (1 point)/Able to read a list of letters and tap on each "A": [id number]
Repeated "22377" forward order (1 point)/Repeated "742" in backward order (1 point)/Able to read a list of letters and tap on each "A": [id number]
Repeated "68618" forward order (1 point)/Repeated "742" in backward order (1 point)/Able to read a list of letters and tap on each "A": [id number]

## 2024-04-15 NOTE — ECT AMBULATORY DISCHARGE PLAN - NSPOSTECTSYMPTOMS_PSY_ALL_CORE
Patient:   MANUEL BERRY            MRN: [id number]            FIN: [id number]               Age:   29 years     Sex:  Female     :  1988   Associated Diagnoses:   None   Author:   Sugey Gonzalez NP      Basic Information   History source: Patient.   Arrival mode: Private vehicle.   History limitation: None.      History of Present Illness   The patient presents with \"feeling sick\".  The onset was 1  days ago.  The course/duration of symptoms is constant.  Location: generalized. The character of symptoms is pain.  The degree at onset was minimal.  The degree at present is minimal.  Risk factors consist of none.  Therapy today: Visited the [facility] clinic, was sent in for further evaluation.  Associated symptoms: nausea, chills and Muscle pain, nasal congestion.        Review of Systems   Constitutional symptoms:  Chills, No fever,    Skin symptoms:  No rash,    Eye symptoms:  No recent vision problems,    ENMT symptoms:  Nasal congestion, No sore throat,    Respiratory symptoms:  No shortness of breath,    Cardiovascular symptoms:  No chest pain,    Gastrointestinal symptoms:  Nausea, No abdominal pain,    Genitourinary symptoms:  No dysuria,    Musculoskeletal symptoms:  No Joint pain,    Neurologic symptoms:  No headache,       Health Status   Allergies: No known allergies.   Medications: Per nurse's notes.   Immunizations: Up to date.   Menstrual history: Last menstrual period: Date 2018.      Past Medical/ Family/ Social History      Medical history   Negative.   Surgical history: .   Family history: Not significant.   Social history: Alcohol use: Denies, Tobacco use: Denies.      Physical Examination               Vital Signs   Vital Signs   2018 16:47          Temperature Oral          100.1 F  HI                             Apical Heart Rate         106 bpm  HI                             Respiratory Rate          18 br/min                             Oxygen Saturation          99 %  .               Per nurse's notes.              Oxygen saturation:  99 %.   Vital Signs were reviewed.   General:  Alert, no acute distress.    Skin:  Warm, dry.    Head:  Normocephalic, atraumatic.    Neck:  Supple, trachea midline.    Eye:  Normal conjunctiva.   Ears, nose, mouth and throat:  Oral mucosa moist.   Cardiovascular:  Regular rate and rhythm, S1, S2.    Respiratory:  Lungs are clear to auscultation, respirations are non-labored, Symmetrical chest wall expansion.    Gastrointestinal:  Soft, Nontender, Non distended.    Musculoskeletal:  Normal ROM, no swelling, no deformity.    Neurological:  Alert and oriented to person, place, time, and situation, No focal neurological deficit observed.    Psychiatric:  Appropriate mood & affect.      Medical Decision Making   Differential Diagnosis:  Dehydration, weakness, influenza, urinary tract infection, urosepsis, viral syndrome, anxiety, allergic reaction.    Rationale:   patient is a 29-year-old female with complaint of feeling sick, states started with chills, nausea, muscle pain, states that the urine was obtained, and was told that maybe had urinary tract infection, kidney infection, was not clear, was sent in for further evaluation.  Patient states has been having low-grade fevers starting today as well, x-ray was obtained, resulted without acute process, urine resulted with blood 1+, but no signs of infection, patient denies any dysuria, or other urinary symptoms, at this time symptoms are consistent with viral infection, patient agrees, feels comfortable going home, will discharge, patient declines need for pain management at this time, states took Tylenol prior to arrival.  Will discharge.  Recommendation was made for patient to follow-up with PCP in the next 2-3 days continue to monitor, patient educated worsening signs and symptoms of when to come back, she voices understanding..   Results review:  Lab results : Lab View   5/18/2018 17:21           Influenz A                Presumptive Neg                             Influenz B                Presumptive Neg                             Influenz Commnt           See Note    5/18/2018 17:00          UA Appear                 Clear                             UA Color                  Yellow                             UA pH                     6.0                             UA Spec Grav              <=1.005                             UA Glucose                Negative                             UA Bili                   Negative                             UA Ketones                Negative                             UA Blood                  1+                             UA Protein                Negative                             UA Urobilinogen           0.2 mg/dL                             UA Nitrite                Negative                             UA Leuk Est               Negative                             UA Epithelial             1 - 5/HPF                             UA RBC                    Rare                             UA WBC                    None Seen                             UA Bacteria               Few    5/18/2018 16:58          Urine Preg POC            Negative  .   Radiology results:  X-ray, * Final Report *    Reason For Exam  thoracic area  pain    Report  XR Chest PA + Lateral:  5/18/2018 6:03 PM    TECHNIQUE: XR Chest PA + Lateral.  Digital chest radiography was  performed.    CLINICAL INDICATION: thoracic area  pain    COMPARISON: 02/28/2017.    FINDINGS:    Heart size: Normal.    Mediastinum: Not significantly widened.    Pleural effusion: None.      Pneumothorax: None.    IMPRESSION:    Lungs do not show significant abnormal increased density. There is no  evidence for acute cardiopulmonary abnormality.     Signature Line  *** Final ***    Dictated by:  Kush Hugo MD  Electronically Signed By:  Kush Hugo MD  on  05/18/2018 18:10  Technologist Initials:   TS          XR Chest PA + Lateral  This document has an image    .       Reexamination/ Reevaluation   Course: well controlled.   Pain status: decreased.   Assessment: exam improved.      Impression and Plan   Diagnosis   Viral infection   Plan   Condition: Stable.    Disposition: Discharged: to home.    Patient was given the following educational materials: Upper Respiratory Infection, Adult.    Follow up with: Follow up with primary care provider Call for follow up appointment in 2-3 days continue to monitor.  Please keep hydrated.  Take Tylenol or ibuprofen as needed.  Return if symptoms worsen  SEEK MEDICAL CARE IF:  ï¿½  After the first few days, you feel you are getting worse rather than better.  ï¿½  You need your caregiver's advice about medicines to control symptoms.  ï¿½  You develop chills, worsening shortness of breath, or brown or red sputum. These may be signs of pneumonia.  ï¿½  You develop yellow or brown nasal discharge or pain in the face, especially when you bend forward. These may be signs of sinusitis.  ï¿½  You develop a fever, swollen neck glands, pain with swallowing, or white areas in the back of your throat. These may be signs of strep throat.     SEEK IMMEDIATE MEDICAL CARE IF:  ï¿½  You have a fever.  ï¿½  You develop severe or persistent headache, ear pain, sinus pain, or chest pain.  ï¿½  You develop wheezing, a prolonged cough, cough up blood, or have a change in your usual mucus (if you have chronic lung disease).   ï¿½  You develop sore muscles or a stiff neck.  .    Counseled: Patient, Family, Regarding diagnosis, Regarding diagnostic results, Regarding treatment plan, Regarding prescription, Patient indicated understanding of instructions.         Excessive Diarrhea/Fever/Inability to tolerate liquids or foods/Increased irritability or sluggishness/Nausea and vomiting that does not stop/Pain not relieved by medications/Unable to urinate

## 2024-04-15 NOTE — BH SCALES AND SCREENS - NSMOCAVISUOEXEC_PSY_ALL_CORE
Able to draw a clock (part 1 - Contour)/Able to draw a clock (part 2 - Numbers)/Able to draw a clock (part 3 - Hands)
Able to draw a clock (part 1 - Contour)
Able to draw a clock (part 1 - Contour)/Able to draw a clock (part 2 - Numbers)/Able to draw a clock (part 3 - Hands)

## 2024-04-15 NOTE — BH SCALES AND SCREENS - NSBHSCALESCRN_PSY_ALL_CORE
[place] Cognitive Assessment (MOCA)
[place] Cognitive Assessment (MOCA)
[place] Cognitive Assessment (MOCA)

## 2024-04-15 NOTE — BH INPATIENT PSYCHIATRY PROGRESS NOTE - OTHER
psychomotor retardation  patient appears brighter "okay, not great" walking improving, using walker

## 2024-04-15 NOTE — BH INPATIENT PSYCHIATRY PROGRESS NOTE - NSBHFUPINTERVALHXFT_PSY_A_CORE
Patient seen for management of MDD and anxiety by NP and MS3 in patient's room. Case discussed with care team. VSS. Patient NPO for ECT #6 today. No acute events overnight. Her mood today is "okay, not great", however, she continues to appear brighter. She endorses feeling improvement in her mood from when she arrived but continues to feel "fuzziness" in her head. Discussed spacing ECT sessions to Monday/Friday schedule to address brain fog. She additionally endorses shakiness improved after reducing medications last week but still present. Denies constipation, diarrhea. She has not been attending group sessions. Denies SI/I/P. MOCA performed today prior to ECT with score 25.

## 2024-04-15 NOTE — ECT AMBULATORY DISCHARGE PLAN - NSDCPEFALRISK_GEN_ALL_CORE
For information on Fall & Injury Prevention, visit: [email or web address] OR  [email or web address] OR  https://www.cdc.gov/steadi/patient.html

## 2024-04-15 NOTE — BH INPATIENT PSYCHIATRY PROGRESS NOTE - NSBHCHARTREVIEWVS_PSY_A_CORE FT
Vital Signs Last 24 Hrs  T(C): 36.4 (04-15-24 @ 07:27), Max: 36.4 (04-15-24 @ 07:27)  T(F): 97.6 (04-15-24 @ 07:27), Max: 97.6 (04-15-24 @ 07:27)  HR: --  BP: --  BP(mean): --  RR: 18 (04-15-24 @ 07:27) (18 - 18)  SpO2: --    Orthostatic VS  04-15-24 @ 07:27  Lying BP: --/-- HR: --  Sitting BP: 131/78 HR: 80  Standing BP: 132/80 HR: 82  Site: --  Mode: --  Orthostatic VS  04-14-24 @ 20:44  Lying BP: --/-- HR: --  Sitting BP: 123/78 HR: 86  Standing BP: 113/82 HR: 95  Site: --  Mode: --  Orthostatic VS  04-14-24 @ 07:31  Lying BP: --/-- HR: --  Sitting BP: 129/72 HR: 85  Standing BP: 118/70 HR: 86  Site: --  Mode: --  Orthostatic VS  04-13-24 @ 20:08  Lying BP: --/-- HR: --  Sitting BP: 134/77 HR: 88  Standing BP: 122/70 HR: 95  Site: --  Mode: --   Vital Signs Last 24 Hrs  T(C): 36.6 (04-15-24 @ 14:51), Max: 36.6 (04-15-24 @ 14:51)  T(F): 97.8 (04-15-24 @ 14:51), Max: 97.8 (04-15-24 @ 14:51)  HR: 85 (04-15-24 @ 14:51) (76 - 115)  BP: 145/71 (04-15-24 @ 14:51) (141/96 - 171/91)  BP(mean): --  RR: 20 (04-15-24 @ 14:35) (15 - 22)  SpO2: 96% (04-15-24 @ 14:35) (95% - 100%)    Orthostatic VS  04-15-24 @ 07:27  Lying BP: --/-- HR: --  Sitting BP: 131/78 HR: 80  Standing BP: 132/80 HR: 82  Site: --  Mode: --  Orthostatic VS  04-14-24 @ 20:44  Lying BP: --/-- HR: --  Sitting BP: 123/78 HR: 86  Standing BP: 113/82 HR: 95  Site: --  Mode: --  Orthostatic VS  04-14-24 @ 07:31  Lying BP: --/-- HR: --  Sitting BP: 129/72 HR: 85  Standing BP: 118/70 HR: 86  Site: --  Mode: --  Orthostatic VS  04-13-24 @ 20:08  Lying BP: --/-- HR: --  Sitting BP: 134/77 HR: 88  Standing BP: 122/70 HR: 95  Site: --  Mode: --

## 2024-04-15 NOTE — ECT AMBULATORY DISCHARGE PLAN - PATIENT PORTAL LINK FT
You can access the FollowMyHealth Patient Portal offered by [facility] by registering at the following website: [email or web address]. By joining [employer]’s FollowMyHealth portal, you will also be able to view your health information using other applications (apps) compatible with our system.
No

## 2024-04-15 NOTE — BH INPATIENT PSYCHIATRY PROGRESS NOTE - NSBHMETABOLIC_PSY_ALL_CORE_FT
BMI: BMI (kg/m2): 33.6 (03-22-24 @ 12:45)  HbA1c:   Glucose:   BP: 144/84 (04-12-24 @ 14:05) (113/60 - 152/82)Vital Signs Last 24 Hrs  T(C): 36.4 (04-15-24 @ 07:27), Max: 36.4 (04-15-24 @ 07:27)  T(F): 97.6 (04-15-24 @ 07:27), Max: 97.6 (04-15-24 @ 07:27)  HR: --  BP: --  BP(mean): --  RR: 18 (04-15-24 @ 07:27) (18 - 18)  SpO2: --    Orthostatic VS  04-15-24 @ 07:27  Lying BP: --/-- HR: --  Sitting BP: 131/78 HR: 80  Standing BP: 132/80 HR: 82  Site: --  Mode: --  Orthostatic VS  04-14-24 @ 20:44  Lying BP: --/-- HR: --  Sitting BP: 123/78 HR: 86  Standing BP: 113/82 HR: 95  Site: --  Mode: --  Orthostatic VS  04-14-24 @ 07:31  Lying BP: --/-- HR: --  Sitting BP: 129/72 HR: 85  Standing BP: 118/70 HR: 86  Site: --  Mode: --  Orthostatic VS  04-13-24 @ 20:08  Lying BP: --/-- HR: --  Sitting BP: 134/77 HR: 88  Standing BP: 122/70 HR: 95  Site: --  Mode: --    Lipid Panel:  BMI: BMI (kg/m2): 33.6 (03-22-24 @ 12:45)  HbA1c:   Glucose:   BP: 145/71 (04-15-24 @ 14:51) (141/96 - 171/91)Vital Signs Last 24 Hrs  T(C): 36.6 (04-15-24 @ 14:51), Max: 36.6 (04-15-24 @ 14:51)  T(F): 97.8 (04-15-24 @ 14:51), Max: 97.8 (04-15-24 @ 14:51)  HR: 85 (04-15-24 @ 14:51) (76 - 115)  BP: 145/71 (04-15-24 @ 14:51) (141/96 - 171/91)  BP(mean): --  RR: 20 (04-15-24 @ 14:35) (15 - 22)  SpO2: 96% (04-15-24 @ 14:35) (95% - 100%)    Orthostatic VS  04-15-24 @ 07:27  Lying BP: --/-- HR: --  Sitting BP: 131/78 HR: 80  Standing BP: 132/80 HR: 82  Site: --  Mode: --  Orthostatic VS  04-14-24 @ 20:44  Lying BP: --/-- HR: --  Sitting BP: 123/78 HR: 86  Standing BP: 113/82 HR: 95  Site: --  Mode: --  Orthostatic VS  04-14-24 @ 07:31  Lying BP: --/-- HR: --  Sitting BP: 129/72 HR: 85  Standing BP: 118/70 HR: 86  Site: --  Mode: --  Orthostatic VS  04-13-24 @ 20:08  Lying BP: --/-- HR: --  Sitting BP: 134/77 HR: 88  Standing BP: 122/70 HR: 95  Site: --  Mode: --    Lipid Panel:

## 2024-04-15 NOTE — BH INPATIENT PSYCHIATRY PROGRESS NOTE - NSBHASSESSSUMMFT_PSY_ALL_CORE
69-year-old female, , no children, retired 3 years ago (was ), domiciled with  in a private residence. PPHx of Depression and LEO, engaged in outpatient tx with Dr Schmid x20+ years and therapist Cole Romero. No previous psychiatric admissions, denies previous suicide attempts, denies NSSIB. Pt denies all substance use. Pt denies legal issues and denies hx of violence/aggression. Denies access to firearms. Pertinent PMH HLD, HTN, IBS, BONIFACIO has CPAP (has not used for months). Referred by [facility] for poor functioning and inability to care for oneself due to worsening anxiety and depression. Patient reports symptoms of depression including dysphoric mood, diminished interest in activities, and fatigue/little energy that prevents her from completing ADLs. She reports recent history of 20 lb weight loss "months ago" and states that she gained back 10 lbs. Additionally reports severe anxiety that prevents her from leaving the house, states that she last left her home ~1 month ago. Denies AVH. Denies SI/I/P. Displays insight into need for pharmacologic treatment for depression/anxiety.    Hospital course: Patient admitted to . Started on home psychiatric regimen of klonopin 1mg TID, rexulti 1.5mg qD, buspirone 20mg qD, hydroxyzine 10mg TID, trazodone 100 mg QHS. Started on Prozac 10mg qD, gradually increased to 40mg. Klonopin and hydroxyzine d/jacinda, buspirone decreased. ECT started on 4/16 with improvement in symptoms. Rexulti d/jacinda 4/11 due to side effects (shakiness). MOCA on 3/26 (prior to ECT #1) - 15, MOCA on 4/10 (prior to ECT #4) - 21, MOCA on 4/15 (prior to ECT #6) - 25.     Today, patient continues to appear brighter. Her mood is "okay" and she endorses feeling improvement from admission. ECT #6 today. Denies A/V hallucinations. Denies SI/I/P. VSS.    Plan:  1. Admit to [facility], 9.13 legal status.    2. Routine observation with q 15 minute checks. Patient denies suicidality, remains in behavioral control.    3. Psych: continue home medication regimen pending collateral.  ECT #6 today  MOCA 4/15 - 25  MOCA 4/10 - 21  MOCA 3/26 - 15  Prozac 40 mg daily  Buspirone 10 mg BID  Trazodone 100 mg QHS  D/C Rexulti  --PRN Ativan 2 mg IM for severe agitation    4. Medical: continue home medication regimen. Labs reviewed.  Amlodipine 2.5 mg daily   Aspirin 81 mg daily  Losartan 50 mg daily  Hydrochlorothiazide 25 mg po daily  Pravastatin 20 mg po daily  B complex/vitamin C/multivitamin    5. Encourage individual, group, and milieu therapy.    6. Collaboration with outpatient psychiatrist Dr. Chester Schmid, [phone].    7. Dispo collaboration with social work.  Remission of symptoms: mood stability, improved anxiety, ability to complete ADLs.

## 2024-04-15 NOTE — BH SCALES AND SCREENS - NSMOCANAMING_PSY_ALL_CORE
Able to name Animal #1 (Lion)/Able to name Animal #2 (Rhino)/Able to name animal #3 (Camel)

## 2024-04-16 PROCEDURE — 99232 SBSQ HOSP IP/OBS MODERATE 35: CPT | Mod: FS,25

## 2024-04-16 RX ADMIN — Medication 10 MILLIGRAM(S): at 20:55

## 2024-04-16 RX ADMIN — Medication 81 MILLIGRAM(S): at 09:57

## 2024-04-16 RX ADMIN — SENNA PLUS 2 TABLET(S): 8.6 TABLET ORAL at 20:55

## 2024-04-16 RX ADMIN — AMLODIPINE BESYLATE 2.5 MILLIGRAM(S): 2.5 TABLET ORAL at 09:57

## 2024-04-16 RX ADMIN — Medication 40 MILLIGRAM(S): at 09:57

## 2024-04-16 RX ADMIN — LOSARTAN POTASSIUM 50 MILLIGRAM(S): 100 TABLET, FILM COATED ORAL at 09:57

## 2024-04-16 RX ADMIN — Medication 10 MILLIGRAM(S): at 09:57

## 2024-04-16 RX ADMIN — ATORVASTATIN CALCIUM 20 MILLIGRAM(S): 80 TABLET, FILM COATED ORAL at 20:55

## 2024-04-16 RX ADMIN — Medication 100 MILLIGRAM(S): at 20:55

## 2024-04-16 NOTE — BH INPATIENT PSYCHIATRY PROGRESS NOTE - NSBHASSESSSUMMFT_PSY_ALL_CORE
69-year-old female, , no children, retired 3 years ago (was ), domiciled with  in a private residence. PPHx of Depression and LEO, engaged in outpatient tx with Dr Schmid x20+ years and therapist Cole Romero. No previous psychiatric admissions, denies previous suicide attempts, denies NSSIB. Pt denies all substance use. Pt denies legal issues and denies hx of violence/aggression. Denies access to firearms. Pertinent PMH HLD, HTN, IBS, BONIFACIO has CPAP (has not used for months). Referred by [facility] for poor functioning and inability to care for oneself due to worsening anxiety and depression. Patient reports symptoms of depression including dysphoric mood, diminished interest in activities, and fatigue/little energy that prevents her from completing ADLs. She reports recent history of 20 lb weight loss "months ago" and states that she gained back 10 lbs. Additionally reports severe anxiety that prevents her from leaving the house, states that she last left her home ~1 month ago. Denies AVH. Denies SI/I/P. Displays insight into need for pharmacologic treatment for depression/anxiety.    Hospital course: Patient admitted to . Started on home psychiatric regimen of klonopin 1mg TID, rexulti 1.5mg qD, buspirone 20mg qD, hydroxyzine 10mg TID, trazodone 100 mg QHS. Started on Prozac 10mg qD, gradually increased to 40mg. Klonopin and hydroxyzine d/jacinda, buspirone decreased. ECT started on 4/16 with improvement in symptoms. Rexulti d/jacinda 4/11 due to side effects (shakiness). MOCA on 3/26 (prior to ECT #1) - 15, MOCA on 4/10 (prior to ECT #4) - 21, MOCA on 4/15 (prior to ECT #6) - 25.     Today, patient continues to appear brighter. Her mood is "okay" and she endorses feeling improvement from admission. ECT #6 yesterday. Denies SI/I/P. VSS.    Plan:  1. Admit to [address] [place] [address] 9. legal status.    2. Routine observation with q 15 minute checks. Patient denies suicidality, remains in behavioral control.    3. Psych: continue home medication regimen pending collateral.  ECT #7 on Friday  MOCA 4/15 - 25  MOCA 4/10 - 21  MOCA 3/26 - 15  Prozac 40 mg daily  Buspirone 10 mg BID  Trazodone 100 mg QHS  D/C Rexulti  --PRN Ativan 2 mg IM for severe agitation    4. Medical: continue home medication regimen. Labs reviewed.  Amlodipine 2.5 mg daily   Aspirin 81 mg daily  Losartan 50 mg daily  Hydrochlorothiazide 25 mg po daily  Pravastatin 20 mg po daily  B complex/vitamin C/multivitamin    5. Encourage individual, group, and milieu therapy.    6. Collaboration with outpatient psychiatrist Dr. Chester Schmid, [phone].    7. Dispo collaboration with social work.  Remission of symptoms: mood stability, improved anxiety, ability to complete ADLs.  Remission of symptoms: mood stability, improved anxiety, ability to complete ADLs.

## 2024-04-16 NOTE — BH INPATIENT PSYCHIATRY PROGRESS NOTE - NSBHFUPINTERVALHXFT_PSY_A_CORE
Patient seen for management of MDD and anxiety by NP and MS3 in patient's room. Case discussed with care team. VSS. Patient received ECT #6 yesterday. No acute events overnight. Her mood today is "okay", denies sadness and anxiety and continues to appear brighter. She continues to feel "shaky", brain fog. Plan to space ECT this week with next session on Friday. Her appetite is poor this morning and she skipped breakfast. She awakened multiple times last night but was able to return to sleep. She has not yet left her room this morning but plans to. She took a shower this morning. Denies SI/I/P.  Patient seen for management of MDD and anxiety by NP and MS3 in patient's room. Case discussed with care team. VSS. Patient received ECT #6 yesterday. No acute events overnight. Her mood today is "okay", denies sadness and anxiety and continues to appear brighter. She continues to feel "shaky", brain fog. Plan to space ECT this week with next session on Friday. Her appetite is poor this morning and she skipped breakfast. She awakened multiple times last night but was able to return to sleep. She has not yet left her room this morning but plans to. She took a shower this morning. Denies SI/I/P.     Spoke with , Kevin, this afternoon. He is able to take her to outpatient ECT sessions on Tues/Thurs if necessary.  Patient seen for management of MDD and anxiety by NP and MS3 in patient's room. Case discussed with care team. VSS. Patient received ECT #6 yesterday. No acute events overnight. Her mood today is "okay", denies sadness and anxiety and continues to appear brighter. She continues to feel "shaky", brain fog. Plan to space ECT this week with next session on Friday. Her appetite is poor this morning and she skipped breakfast. She awakened multiple times last night but was able to return to sleep. She has not yet left her room this morning but plans to. She took a shower this morning. Encouraged her to attend groups. Denies SI/I/P.     Spoke with , Kevin, this afternoon. He is able to take her to outpatient ECT sessions on Tues/Thurs if necessary.

## 2024-04-16 NOTE — BH INPATIENT PSYCHIATRY PROGRESS NOTE - NSBHCHARTREVIEWVS_PSY_A_CORE FT
Vital Signs Last 24 Hrs  T(C): 36.4 (04-16-24 @ 08:05), Max: 36.7 (04-15-24 @ 20:39)  T(F): 97.5 (04-16-24 @ 08:05), Max: 98.1 (04-15-24 @ 20:39)  HR: 85 (04-15-24 @ 14:51) (76 - 115)  BP: 145/71 (04-15-24 @ 14:51) (141/96 - 171/91)  BP(mean): --  RR: 18 (04-16-24 @ 08:05) (15 - 22)  SpO2: 98% (04-16-24 @ 06:36) (95% - 100%)    Orthostatic VS  04-16-24 @ 08:05  Lying BP: --/-- HR: --  Sitting BP: 122/63 HR: 84  Standing BP: 106/60 HR: 95  Site: --  Mode: --  Orthostatic VS  04-15-24 @ 20:39  Lying BP: 120/78 HR: 101  Sitting BP: 119/76 HR: 108  Standing BP: --/-- HR: --  Site: --  Mode: --  Orthostatic VS  04-15-24 @ 07:27  Lying BP: --/-- HR: --  Sitting BP: 131/78 HR: 80  Standing BP: 132/80 HR: 82  Site: --  Mode: --  Orthostatic VS  04-14-24 @ 20:44  Lying BP: --/-- HR: --  Sitting BP: 123/78 HR: 86  Standing BP: 113/82 HR: 95  Site: --  Mode: --   Vital Signs Last 24 Hrs  T(C): 36.4 (04-16-24 @ 08:05), Max: 36.7 (04-15-24 @ 20:39)  T(F): 97.5 (04-16-24 @ 08:05), Max: 98.1 (04-15-24 @ 20:39)  HR: --  BP: --  BP(mean): --  RR: 18 (04-16-24 @ 08:05) (18 - 18)  SpO2: 98% (04-16-24 @ 06:36) (98% - 98%)    Orthostatic VS  04-16-24 @ 08:05  Lying BP: --/-- HR: --  Sitting BP: 122/63 HR: 84  Standing BP: 106/60 HR: 95  Site: --  Mode: --  Orthostatic VS  04-15-24 @ 20:39  Lying BP: 120/78 HR: 101  Sitting BP: 119/76 HR: 108  Standing BP: --/-- HR: --  Site: --  Mode: --  Orthostatic VS  04-15-24 @ 07:27  Lying BP: --/-- HR: --  Sitting BP: 131/78 HR: 80  Standing BP: 132/80 HR: 82  Site: --  Mode: --  Orthostatic VS  04-14-24 @ 20:44  Lying BP: --/-- HR: --  Sitting BP: 123/78 HR: 86  Standing BP: 113/82 HR: 95  Site: --  Mode: --

## 2024-04-16 NOTE — CHART NOTE - NSCHARTNOTEFT_GEN_A_CORE
Called in regards to shaking. Patient states shaking started after several sessions( has had total of 6 sessions) of ECT. She denies any decreased her ability to function or ambulate. On exam, no dysmetria, gait instability or flapping tremors noted. Patient with faint resting tremor that resolved with movement. Vitals signs stable. Will order CMP to assess electrolytes for tomorrow AM.    IF tremors worsen, would consider neurology evaluation.

## 2024-04-16 NOTE — BH INPATIENT PSYCHIATRY PROGRESS NOTE - NSBHMETABOLIC_PSY_ALL_CORE_FT
BMI: BMI (kg/m2): 33.6 (03-22-24 @ 12:45)  HbA1c:   Glucose:   BP: 145/71 (04-15-24 @ 14:51) (141/96 - 171/91)Vital Signs Last 24 Hrs  T(C): 36.4 (04-16-24 @ 08:05), Max: 36.7 (04-15-24 @ 20:39)  T(F): 97.5 (04-16-24 @ 08:05), Max: 98.1 (04-15-24 @ 20:39)  HR: 85 (04-15-24 @ 14:51) (76 - 115)  BP: 145/71 (04-15-24 @ 14:51) (141/96 - 171/91)  BP(mean): --  RR: 18 (04-16-24 @ 08:05) (15 - 22)  SpO2: 98% (04-16-24 @ 06:36) (95% - 100%)    Orthostatic VS  04-16-24 @ 08:05  Lying BP: --/-- HR: --  Sitting BP: 122/63 HR: 84  Standing BP: 106/60 HR: 95  Site: --  Mode: --  Orthostatic VS  04-15-24 @ 20:39  Lying BP: 120/78 HR: 101  Sitting BP: 119/76 HR: 108  Standing BP: --/-- HR: --  Site: --  Mode: --  Orthostatic VS  04-15-24 @ 07:27  Lying BP: --/-- HR: --  Sitting BP: 131/78 HR: 80  Standing BP: 132/80 HR: 82  Site: --  Mode: --  Orthostatic VS  04-14-24 @ 20:44  Lying BP: --/-- HR: --  Sitting BP: 123/78 HR: 86  Standing BP: 113/82 HR: 95  Site: --  Mode: --    Lipid Panel:  BMI: BMI (kg/m2): 33.6 (03-22-24 @ 12:45)  HbA1c:   Glucose:   BP: 145/71 (04-15-24 @ 14:51) (141/96 - 171/91)Vital Signs Last 24 Hrs  T(C): 36.4 (04-16-24 @ 08:05), Max: 36.7 (04-15-24 @ 20:39)  T(F): 97.5 (04-16-24 @ 08:05), Max: 98.1 (04-15-24 @ 20:39)  HR: --  BP: --  BP(mean): --  RR: 18 (04-16-24 @ 08:05) (18 - 18)  SpO2: 98% (04-16-24 @ 06:36) (98% - 98%)    Orthostatic VS  04-16-24 @ 08:05  Lying BP: --/-- HR: --  Sitting BP: 122/63 HR: 84  Standing BP: 106/60 HR: 95  Site: --  Mode: --  Orthostatic VS  04-15-24 @ 20:39  Lying BP: 120/78 HR: 101  Sitting BP: 119/76 HR: 108  Standing BP: --/-- HR: --  Site: --  Mode: --  Orthostatic VS  04-15-24 @ 07:27  Lying BP: --/-- HR: --  Sitting BP: 131/78 HR: 80  Standing BP: 132/80 HR: 82  Site: --  Mode: --  Orthostatic VS  04-14-24 @ 20:44  Lying BP: --/-- HR: --  Sitting BP: 123/78 HR: 86  Standing BP: 113/82 HR: 95  Site: --  Mode: --    Lipid Panel:

## 2024-04-16 NOTE — BH INPATIENT PSYCHIATRY PROGRESS NOTE - OTHER
psychomotor retardation  patient appears brighter Smiling at times walking improving, using walker  "okay"

## 2024-04-17 LAB
A1C WITH ESTIMATED AVERAGE GLUCOSE RESULT: 5.1 % — SIGNIFICANT CHANGE UP (ref 4–5.6)
ALBUMIN SERPL ELPH-MCNC: 4.2 G/DL — SIGNIFICANT CHANGE UP (ref 3.3–5)
ALP SERPL-CCNC: 73 U/L — SIGNIFICANT CHANGE UP (ref 40–120)
ALT FLD-CCNC: 11 U/L — SIGNIFICANT CHANGE UP (ref 4–33)
ANION GAP SERPL CALC-SCNC: 13 MMOL/L — SIGNIFICANT CHANGE UP (ref 7–14)
AST SERPL-CCNC: 10 U/L — SIGNIFICANT CHANGE UP (ref 4–32)
BILIRUB SERPL-MCNC: 0.8 MG/DL — SIGNIFICANT CHANGE UP (ref 0.2–1.2)
BUN SERPL-MCNC: 22 MG/DL — SIGNIFICANT CHANGE UP (ref 7–23)
CALCIUM SERPL-MCNC: 9.4 MG/DL — SIGNIFICANT CHANGE UP (ref 8.4–10.5)
CHLORIDE SERPL-SCNC: 104 MMOL/L — SIGNIFICANT CHANGE UP (ref 98–107)
CHOLEST SERPL-MCNC: 129 MG/DL — SIGNIFICANT CHANGE UP
CO2 SERPL-SCNC: 26 MMOL/L — SIGNIFICANT CHANGE UP (ref 22–31)
CREAT SERPL-MCNC: 0.96 MG/DL — SIGNIFICANT CHANGE UP (ref 0.5–1.3)
EGFR: 64 ML/MIN/1.73M2 — SIGNIFICANT CHANGE UP
ESTIMATED AVERAGE GLUCOSE: 100 — SIGNIFICANT CHANGE UP
GLUCOSE SERPL-MCNC: 94 MG/DL — SIGNIFICANT CHANGE UP (ref 70–99)
HDLC SERPL-MCNC: 56 MG/DL — SIGNIFICANT CHANGE UP
LIPID PNL WITH DIRECT LDL SERPL: 59 MG/DL — SIGNIFICANT CHANGE UP
MAGNESIUM SERPL-MCNC: 1.8 MG/DL — SIGNIFICANT CHANGE UP (ref 1.6–2.6)
NON HDL CHOLESTEROL: 73 MG/DL — SIGNIFICANT CHANGE UP
PHOSPHATE SERPL-MCNC: 2.5 MG/DL — SIGNIFICANT CHANGE UP (ref 2.5–4.5)
POTASSIUM SERPL-MCNC: 3.3 MMOL/L — LOW (ref 3.5–5.3)
POTASSIUM SERPL-SCNC: 3.3 MMOL/L — LOW (ref 3.5–5.3)
PROT SERPL-MCNC: 6.4 G/DL — SIGNIFICANT CHANGE UP (ref 6–8.3)
SODIUM SERPL-SCNC: 143 MMOL/L — SIGNIFICANT CHANGE UP (ref 135–145)
TRIGL SERPL-MCNC: 70 MG/DL — SIGNIFICANT CHANGE UP

## 2024-04-17 PROCEDURE — 99232 SBSQ HOSP IP/OBS MODERATE 35: CPT | Mod: FS

## 2024-04-17 RX ORDER — POTASSIUM CHLORIDE 20 MEQ
20 PACKET (EA) ORAL ONCE
Refills: 0 | Status: COMPLETED | OUTPATIENT
Start: 2024-04-17 | End: 2024-04-17

## 2024-04-17 RX ADMIN — AMLODIPINE BESYLATE 2.5 MILLIGRAM(S): 2.5 TABLET ORAL at 09:08

## 2024-04-17 RX ADMIN — Medication 100 MILLIGRAM(S): at 20:41

## 2024-04-17 RX ADMIN — Medication 81 MILLIGRAM(S): at 09:08

## 2024-04-17 RX ADMIN — Medication 10 MILLIGRAM(S): at 09:08

## 2024-04-17 RX ADMIN — Medication 10 MILLIGRAM(S): at 20:43

## 2024-04-17 RX ADMIN — SENNA PLUS 2 TABLET(S): 8.6 TABLET ORAL at 20:41

## 2024-04-17 RX ADMIN — LOSARTAN POTASSIUM 50 MILLIGRAM(S): 100 TABLET, FILM COATED ORAL at 09:07

## 2024-04-17 RX ADMIN — ATORVASTATIN CALCIUM 20 MILLIGRAM(S): 80 TABLET, FILM COATED ORAL at 20:41

## 2024-04-17 RX ADMIN — Medication 40 MILLIGRAM(S): at 09:08

## 2024-04-17 RX ADMIN — Medication 20 MILLIEQUIVALENT(S): at 11:40

## 2024-04-17 NOTE — DIETITIAN INITIAL EVALUATION ADULT - ADD RECOMMEND
Encourage adequate po intake as tolerated and honor food preferences PRN.   Monitor PO intake/tolerance, weights, labs, BM's, and skin integrity.

## 2024-04-17 NOTE — BH INPATIENT PSYCHIATRY PROGRESS NOTE - NSBHCHARTREVIEWVS_PSY_A_CORE FT
Vital Signs Last 24 Hrs  T(C): 36.4 (04-17-24 @ 07:20), Max: 36.7 (04-16-24 @ 20:46)  T(F): 97.6 (04-17-24 @ 07:20), Max: 98 (04-16-24 @ 20:46)  HR: --  BP: --  BP(mean): --  RR: 17 (04-17-24 @ 07:20) (17 - 17)  SpO2: 98% (04-17-24 @ 06:09) (98% - 98%)    Orthostatic VS  04-17-24 @ 07:20  Lying BP: --/-- HR: --  Sitting BP: 117/71 HR: 81  Standing BP: 108/80 HR: 92  Site: --  Mode: --  Orthostatic VS  04-16-24 @ 20:46  Lying BP: --/-- HR: --  Sitting BP: 123/72 HR: 111  Standing BP: 109/69 HR: 111  Site: --  Mode: --  Orthostatic VS  04-16-24 @ 08:05  Lying BP: --/-- HR: --  Sitting BP: 122/63 HR: 84  Standing BP: 106/60 HR: 95  Site: --  Mode: --  Orthostatic VS  04-15-24 @ 20:39  Lying BP: 120/78 HR: 101  Sitting BP: 119/76 HR: 108  Standing BP: --/-- HR: --  Site: --  Mode: --

## 2024-04-17 NOTE — BH INPATIENT PSYCHIATRY PROGRESS NOTE - NSBHMETABOLIC_PSY_ALL_CORE_FT
BMI: BMI (kg/m2): 33.6 (03-22-24 @ 12:45)  HbA1c:   Glucose:   BP: 145/71 (04-15-24 @ 14:51) (141/96 - 171/91)Vital Signs Last 24 Hrs  T(C): 36.4 (04-17-24 @ 07:20), Max: 36.7 (04-16-24 @ 20:46)  T(F): 97.6 (04-17-24 @ 07:20), Max: 98 (04-16-24 @ 20:46)  HR: --  BP: --  BP(mean): --  RR: 17 (04-17-24 @ 07:20) (17 - 17)  SpO2: 98% (04-17-24 @ 06:09) (98% - 98%)    Orthostatic VS  04-17-24 @ 07:20  Lying BP: --/-- HR: --  Sitting BP: 117/71 HR: 81  Standing BP: 108/80 HR: 92  Site: --  Mode: --  Orthostatic VS  04-16-24 @ 20:46  Lying BP: --/-- HR: --  Sitting BP: 123/72 HR: 111  Standing BP: 109/69 HR: 111  Site: --  Mode: --  Orthostatic VS  04-16-24 @ 08:05  Lying BP: --/-- HR: --  Sitting BP: 122/63 HR: 84  Standing BP: 106/60 HR: 95  Site: --  Mode: --  Orthostatic VS  04-15-24 @ 20:39  Lying BP: 120/78 HR: 101  Sitting BP: 119/76 HR: 108  Standing BP: --/-- HR: --  Site: --  Mode: --    Lipid Panel:  BMI: BMI (kg/m2): 33.6 (03-22-24 @ 12:45)  HbA1c: A1C with Estimated Average Glucose Result: 5.1 % (04-17-24 @ 08:00)    Glucose:   BP: 145/71 (04-15-24 @ 14:51) (141/96 - 171/91)Vital Signs Last 24 Hrs  T(C): 36.4 (04-17-24 @ 07:20), Max: 36.7 (04-16-24 @ 20:46)  T(F): 97.6 (04-17-24 @ 07:20), Max: 98 (04-16-24 @ 20:46)  HR: --  BP: --  BP(mean): --  RR: 17 (04-17-24 @ 07:20) (17 - 17)  SpO2: 98% (04-17-24 @ 06:09) (98% - 98%)    Orthostatic VS  04-17-24 @ 07:20  Lying BP: --/-- HR: --  Sitting BP: 117/71 HR: 81  Standing BP: 108/80 HR: 92  Site: --  Mode: --  Orthostatic VS  04-16-24 @ 20:46  Lying BP: --/-- HR: --  Sitting BP: 123/72 HR: 111  Standing BP: 109/69 HR: 111  Site: --  Mode: --  Orthostatic VS  04-16-24 @ 08:05  Lying BP: --/-- HR: --  Sitting BP: 122/63 HR: 84  Standing BP: 106/60 HR: 95  Site: --  Mode: --  Orthostatic VS  04-15-24 @ 20:39  Lying BP: 120/78 HR: 101  Sitting BP: 119/76 HR: 108  Standing BP: --/-- HR: --  Site: --  Mode: --    Lipid Panel: Date/Time: 04-17-24 @ 08:00  Cholesterol, Serum: 129  LDL Cholesterol Calculated: 59  HDL Cholesterol, Serum: 56  Total Cholesterol/HDL Ration Measurement: --  Triglycerides, Serum: 70

## 2024-04-17 NOTE — BH INPATIENT PSYCHIATRY PROGRESS NOTE - CURRENT MEDICATION
MEDICATIONS  (STANDING):  amLODIPine   Tablet 2.5 milliGRAM(s) Oral daily  aspirin  chewable 81 milliGRAM(s) Oral daily  atorvastatin 20 milliGRAM(s) Oral at bedtime  busPIRone 10 milliGRAM(s) Oral two times a day  FLUoxetine 40 milliGRAM(s) Oral daily  hydrochlorothiazide 25 milliGRAM(s) Oral daily  losartan 50 milliGRAM(s) Oral daily  polyethylene glycol 3350 17 Gram(s) Oral daily  senna 2 Tablet(s) Oral at bedtime  traZODone 100 milliGRAM(s) Oral at bedtime    MEDICATIONS  (PRN):  acetaminophen     Tablet .. 650 milliGRAM(s) Oral every 6 hours PRN Mild Pain (1 - 3), Moderate Pain (4 - 6), Severe Pain (7 - 10)  aluminum hydroxide/magnesium hydroxide/simethicone Suspension 30 milliLiter(s) Oral every 6 hours PRN Dyspepsia  bisacodyl 5 milliGRAM(s) Oral at bedtime PRN Constipation  LORazepam     Tablet 1 milliGRAM(s) Oral every 6 hours PRN Severe Anxiety/Agitation  LORazepam     Tablet 0.5 milliGRAM(s) Oral every 6 hours PRN Anxiety  LORazepam   Injectable 2 milliGRAM(s) IntraMuscular Once PRN severe agitation  magnesium hydroxide Suspension 30 milliLiter(s) Oral daily PRN Constipation

## 2024-04-17 NOTE — DIETITIAN INITIAL EVALUATION ADULT - OTHER INFO
Patient is a 68 y/o female with PPHx of Depression and LEO, pertinent PMHx of HLD, HTN, IBS, BONIFACIO has CPAP (has not used for months). Referred by [facility] for poor functioning and inability to care for oneself due to worsening anxiety and depression. Currently receiving ECT.     Met with patient in her room today who was lying in the bed. Patient reports her appetite is overall good, she skipped BF yesterday morning as she did not like the food on her tray (waffle was too soft), states she had good PO at lunch and dinner yesterday and also at breakfast this AM. Menu options reviewed with patient today, new food preferences taken and honored on CBoard. NKFA reported. Denies chewing/swallowing difficulties on current diet. Writer encouraged po intake as tolerated, patient verbalized understanding.    Reports height = 5'5"; unable to recall her UBW; as per chart review pt reports recent history of 20 lb weight loss "months ago" and states that she gained back 10 lbs. Unable to confirm wt changes PTA today. Patient does not appear cachetic. Current adm wt 175lb (3/22/24). No new wt to reassess at this time. Will monitor wt trend.

## 2024-04-17 NOTE — BH INPATIENT PSYCHIATRY PROGRESS NOTE - PRN MEDS
MEDICATIONS  (PRN):  acetaminophen     Tablet .. 650 milliGRAM(s) Oral every 6 hours PRN Mild Pain (1 - 3), Moderate Pain (4 - 6), Severe Pain (7 - 10)  aluminum hydroxide/magnesium hydroxide/simethicone Suspension 30 milliLiter(s) Oral every 6 hours PRN Dyspepsia  bisacodyl 5 milliGRAM(s) Oral at bedtime PRN Constipation  LORazepam     Tablet 1 milliGRAM(s) Oral every 6 hours PRN Severe Anxiety/Agitation  LORazepam     Tablet 0.5 milliGRAM(s) Oral every 6 hours PRN Anxiety  LORazepam   Injectable 2 milliGRAM(s) IntraMuscular Once PRN severe agitation  magnesium hydroxide Suspension 30 milliLiter(s) Oral daily PRN Constipation

## 2024-04-17 NOTE — BH INPATIENT PSYCHIATRY PROGRESS NOTE - NSBHASSESSSUMMFT_PSY_ALL_CORE
69-year-old female, , no children, retired 3 years ago (was ), domiciled with  in a private residence. PPHx of Depression and LEO, engaged in outpatient tx with Dr Schmid x20+ years and therapist Cole Romero. No previous psychiatric admissions, denies previous suicide attempts, denies NSSIB. Pt denies all substance use. Pt denies legal issues and denies hx of violence/aggression. Denies access to firearms. Pertinent PMH HLD, HTN, IBS, BONIFACIO has CPAP (has not used for months). Referred by [facility] for poor functioning and inability to care for oneself due to worsening anxiety and depression. Patient reports symptoms of depression including dysphoric mood, diminished interest in activities, and fatigue/little energy that prevents her from completing ADLs. She reports recent history of 20 lb weight loss "months ago" and states that she gained back 10 lbs. Additionally reports severe anxiety that prevents her from leaving the house, states that she last left her home ~1 month ago. Denies AVH. Denies SI/I/P. Displays insight into need for pharmacologic treatment for depression/anxiety.    Hospital course: Patient admitted to 2W. Started on home psychiatric regimen of klonopin 1mg TID, rexulti 1.5mg qD, buspirone 20mg qD, hydroxyzine 10mg TID, trazodone 100 mg QHS. Started on Prozac 10mg qD, gradually increased to 40mg. Klonopin and hydroxyzine d/jacinda, buspirone decreased. ECT started on 4/16 with improvement in symptoms. Rexulti d/jacinda 4/11 due to side effects (shakiness). MOCA on 3/26 (prior to ECT #1) - 15, MOCA on 4/10 (prior to ECT #4) - 21, MOCA on 4/15 (prior to ECT #6) - 25.     Today, patient continues to appear brighter. Her mood is "a bit better" but she is tired due to poor sleep overnight. Internal medicine saw her for tremor and recommended CMP, found to have mildly decreased K+ at 3.3 but otherwise wnl. VSS. Denies AV hallucinations, denies SI/I/P.     Plan:  1. Admit to [facility], 9.13 legal status.    2. Routine observation with q 15 minute checks. Patient denies suicidality, remains in behavioral control.    3. Psych: continue home medication regimen pending collateral.  ECT #7 on Friday  MOCA 4/15 - 25  MOCA 4/10 - 21  MOCA 3/26 - 15  Prozac 40 mg daily  Buspirone 10 mg BID  Trazodone 100 mg QHS  D/C Rexulti  --PRN Ativan 2 mg IM for severe agitation    4. Medical: continue home medication regimen. Labs reviewed.  Amlodipine 2.5 mg daily   Aspirin 81 mg daily  Losartan 50 mg daily  Hydrochlorothiazide 25 mg po daily  Pravastatin 20 mg po daily  B complex/vitamin C/multivitamin    5. Encourage individual, group, and milieu therapy.    6. Collaboration with outpatient psychiatrist Dr. Chester Schmid, [phone].    7. Dispo collaboration with social work.

## 2024-04-17 NOTE — DIETITIAN INITIAL EVALUATION ADULT - PERTINENT LABORATORY DATA
04-17    143  |  104  |  22  ----------------------------<  94  3.3<L>   |  26  |  0.96    Ca    9.4      17 Apr 2024 08:00  Phos  2.5     04-17  Mg     1.80     04-17    TPro  6.4  /  Alb  4.2  /  TBili  0.8  /  DBili  x   /  AST  10  /  ALT  11  /  AlkPhos  73  04-17  A1C with Estimated Average Glucose Result: 5.1 % (04-17-24 @ 08:00)    Lipid Panel: Date/Time: 04-17-24 @ 08:00  Cholesterol, Serum: 129  LDL Cholesterol Calculated: 59  HDL Cholesterol, Serum: 56  Total Cholesterol/HDL Ration Measurement: --  Triglycerides, Serum: 70

## 2024-04-17 NOTE — DIETITIAN INITIAL EVALUATION ADULT - NSFNSGIASSESSMENTFT_GEN_A_CORE
Patient denies GI distress (nausea/vomiting/diarrhea/ constipation) at this time, states she has daily BMs now.

## 2024-04-17 NOTE — BH INPATIENT PSYCHIATRY PROGRESS NOTE - OTHER
psychomotor retardation  walking improving, using walker  Lying in bed patient appears brighter Smiling at times "In between good and bad", "a bit better"

## 2024-04-17 NOTE — BH INPATIENT PSYCHIATRY PROGRESS NOTE - NSBHFUPINTERVALHXFT_PSY_A_CORE
Patient seen for management of MDD and anxiety by NP and MS3 in patient's room. Case discussed with care team. VSS. Patient seen by internal medicine yesterday for evaluation of her shakiness, who noted resting tremor and recommended CMP which was notable only for mildly decreased K (3.3). Her tremor is improved today. Her brain fog is also improved. Her mood today is "in between good and bad" and "a bit better". She reports feeling tired this morning due to poor sleep overnight but is otherwise well. Appetite is adequate. She denies new medication side effects. Her , Kevin, visited last night. Her affect brightened and she smiled when discussing his visit. Denies AV hallucinations, denies SI/I/P.  Patient seen for management of MDD and anxiety by NP and MS3 in patient's room. Case discussed with care team. VSS. Patient seen by internal medicine yesterday for evaluation of her shakiness, who noted resting tremor and recommended CMP which was notable only for mildly decreased K (3.3). Hospitalist contacted about results. Her tremor is improved today. Her brain fog is also improved. Her mood today is "in between good and bad" and "a bit better". She reports feeling tired this morning due to poor sleep overnight but is otherwise well. Appetite is adequate. She denies new medication side effects. Her , Kevin, visited last night. Her affect brightened and she smiled when discussing his visit. Denies AV hallucinations, denies SI/I/P.

## 2024-04-18 PROCEDURE — 99232 SBSQ HOSP IP/OBS MODERATE 35: CPT | Mod: FS

## 2024-04-18 RX ADMIN — Medication 81 MILLIGRAM(S): at 10:08

## 2024-04-18 RX ADMIN — ATORVASTATIN CALCIUM 20 MILLIGRAM(S): 80 TABLET, FILM COATED ORAL at 20:44

## 2024-04-18 RX ADMIN — Medication 100 MILLIGRAM(S): at 20:44

## 2024-04-18 RX ADMIN — Medication 40 MILLIGRAM(S): at 10:07

## 2024-04-18 RX ADMIN — SENNA PLUS 2 TABLET(S): 8.6 TABLET ORAL at 20:44

## 2024-04-18 RX ADMIN — Medication 10 MILLIGRAM(S): at 10:07

## 2024-04-18 RX ADMIN — Medication 10 MILLIGRAM(S): at 20:44

## 2024-04-18 NOTE — BH INPATIENT PSYCHIATRY PROGRESS NOTE - NSBHFUPINTERVALHXFT_PSY_A_CORE
Patient seen for management of MDD and anxiety by MS3 in patient's room. Case discussed with care team. Patient orthostatic this morning (sitting 116/77, standing 91/71 with  -- repeat standing 96/78 with ). No events overnight. Patient reports mood is "half and half", denies depression and anxiety. Encouraged patient to drink water given orthostatic vitals. Sleep and appetite adequate. Denies brain fog and shakiness today, feels spacing of ECT sessions has helped with her side effects. Denies medication side effects. Denies AV hallucinations, denies SI/I/P.   Patient seen for management of MDD and anxiety by MS3 in patient's room. Case discussed with care team. ECT #7 tomorrow. Patient orthostatic this morning (sitting 116/77, standing 91/71 with  -- repeat standing 96/78 with ). No events overnight. Patient reports mood is "half and half", denies depression and anxiety. Encouraged patient to drink water given orthostatic vitals. Sleep and appetite adequate. Denies brain fog and shakiness today, feels spacing of ECT sessions has helped with her side effects. Denies medication side effects. Denies AV hallucinations, denies SI/I/P.   Patient seen for management of MDD and anxiety by MS3 in patient's room. Case discussed with care team. ECT #7 tomorrow, reminded to maintain NPO after midnight. Patient orthostatic this morning (sitting 116/77, standing 91/71 with  -- repeat standing 96/78 with ). No events overnight. Patient reports mood is "half and half", denies depression and anxiety. Encouraged patient to drink water given orthostatic vitals. Sleep and appetite adequate. Denies brain fog and shakiness today, feels spacing of ECT sessions has helped with her side effects. Denies medication side effects. Denies AV hallucinations, denies SI/I/P.

## 2024-04-18 NOTE — BH INPATIENT PSYCHIATRY PROGRESS NOTE - NSBHMETABOLIC_PSY_ALL_CORE_FT
BMI: BMI (kg/m2): 33.6 (03-22-24 @ 12:45)  HbA1c: A1C with Estimated Average Glucose Result: 5.1 % (04-17-24 @ 08:00)    Glucose:   BP: 145/71 (04-15-24 @ 14:51) (141/96 - 171/91)Vital Signs Last 24 Hrs  T(C): 36.6 (04-18-24 @ 08:30), Max: 36.6 (04-17-24 @ 20:43)  T(F): 97.8 (04-18-24 @ 08:30), Max: 97.8 (04-17-24 @ 20:43)  HR: --  BP: --  BP(mean): --  RR: --  SpO2: 98% (04-18-24 @ 06:49) (98% - 99%)    Orthostatic VS  04-18-24 @ 08:30  Lying BP: --/-- HR: --  Sitting BP: 116/61 HR: 82  Standing BP: 91/71 HR: 110  Site: --  Mode: --  Orthostatic VS  04-17-24 @ 20:43  Lying BP: --/-- HR: --  Sitting BP: 120/86 HR: 85  Standing BP: 111/81 HR: 96  Site: --  Mode: --  Orthostatic VS  04-17-24 @ 07:20  Lying BP: --/-- HR: --  Sitting BP: 117/71 HR: 81  Standing BP: 108/80 HR: 92  Site: --  Mode: --  Orthostatic VS  04-16-24 @ 20:46  Lying BP: --/-- HR: --  Sitting BP: 123/72 HR: 111  Standing BP: 109/69 HR: 111  Site: --  Mode: --    Lipid Panel: Date/Time: 04-17-24 @ 08:00  Cholesterol, Serum: 129  LDL Cholesterol Calculated: 59  HDL Cholesterol, Serum: 56  Total Cholesterol/HDL Ration Measurement: --  Triglycerides, Serum: 70

## 2024-04-18 NOTE — BH INPATIENT PSYCHIATRY PROGRESS NOTE - NSBHASSESSSUMMFT_PSY_ALL_CORE
69-year-old female, , no children, retired 3 years ago (was ), domiciled with  in a private residence. PPHx of Depression and LEO, engaged in outpatient tx with Dr Schmid x20+ years and therapist Cole Romero. No previous psychiatric admissions, denies previous suicide attempts, denies NSSIB. Pt denies all substance use. Pt denies legal issues and denies hx of violence/aggression. Denies access to firearms. Pertinent PMH HLD, HTN, IBS, BONIFACIO has CPAP (has not used for months). Referred by [facility] for poor functioning and inability to care for oneself due to worsening anxiety and depression. Patient reports symptoms of depression including dysphoric mood, diminished interest in activities, and fatigue/little energy that prevents her from completing ADLs. She reports recent history of 20 lb weight loss "months ago" and states that she gained back 10 lbs. Additionally reports severe anxiety that prevents her from leaving the house, states that she last left her home ~1 month ago. Denies AVH. Denies SI/I/P. Displays insight into need for pharmacologic treatment for depression/anxiety.    Hospital course: Patient admitted to 2W. Started on home psychiatric regimen of klonopin 1mg TID, rexulti 1.5mg qD, buspirone 20mg qD, hydroxyzine 10mg TID, trazodone 100 mg QHS. Started on Prozac 10mg qD, gradually increased to 40mg. Klonopin and hydroxyzine d/jacinda, buspirone decreased. ECT started on 4/16 with improvement in symptoms. Rexulti d/jacinda 4/11 due to side effects (shakiness). MOCA on 3/26 (prior to ECT #1) - 15, MOCA on 4/10 (prior to ECT #4) - 21, MOCA on 4/15 (prior to ECT #6) - 25.     Patient orthostatic today, encouraged hydration. Denies depression and anxiety, denies brain fog and shakiness with spacing of ECT sessions. Plan for ECT #7 tomorrow. Denies AV hallucinations, denies SI/I/P.     Plan:  1. Admit to [facility], 9.13 legal status.    2. Routine observation with q 15 minute checks. Patient denies suicidality, remains in behavioral control.    3. Psych: continue home medication regimen pending collateral.  ECT #7 on Friday  MOCA 4/15 - 25  MOCA 4/10 - 21  MOCA 3/26 - 15  Prozac 40 mg daily  Buspirone 10 mg BID  Trazodone 100 mg QHS  D/C Rexulti  --PRN Ativan 2 mg IM for severe agitation    4. Medical: continue home medication regimen. Labs reviewed.  Amlodipine 2.5 mg daily   Aspirin 81 mg daily  Losartan 50 mg daily  Hydrochlorothiazide 25 mg po daily  Pravastatin 20 mg po daily  B complex/vitamin C/multivitamin    5. Encourage individual, group, and milieu therapy.    6. Collaboration with outpatient psychiatrist Dr. Chester Schmid, [phone].    7. Dispo collaboration with social work.

## 2024-04-18 NOTE — BH INPATIENT PSYCHIATRY PROGRESS NOTE - NSBHCHARTREVIEWVS_PSY_A_CORE FT
Vital Signs Last 24 Hrs  T(C): 36.6 (04-18-24 @ 08:30), Max: 36.6 (04-17-24 @ 20:43)  T(F): 97.8 (04-18-24 @ 08:30), Max: 97.8 (04-17-24 @ 20:43)  HR: --  BP: --  BP(mean): --  RR: --  SpO2: 98% (04-18-24 @ 06:49) (98% - 99%)    Orthostatic VS  04-18-24 @ 08:30  Lying BP: --/-- HR: --  Sitting BP: 116/61 HR: 82  Standing BP: 91/71 HR: 110  Site: --  Mode: --  Orthostatic VS  04-17-24 @ 20:43  Lying BP: --/-- HR: --  Sitting BP: 120/86 HR: 85  Standing BP: 111/81 HR: 96  Site: --  Mode: --  Orthostatic VS  04-17-24 @ 07:20  Lying BP: --/-- HR: --  Sitting BP: 117/71 HR: 81  Standing BP: 108/80 HR: 92  Site: --  Mode: --  Orthostatic VS  04-16-24 @ 20:46  Lying BP: --/-- HR: --  Sitting BP: 123/72 HR: 111  Standing BP: 109/69 HR: 111  Site: --  Mode: --   Vital Signs Last 24 Hrs  T(C): 36.6 (04-18-24 @ 08:30), Max: 36.6 (04-17-24 @ 20:43)  T(F): 97.8 (04-18-24 @ 08:30), Max: 97.8 (04-17-24 @ 20:43)  HR: --  BP: --  BP(mean): --  RR: 18 (04-18-24 @ 10:00) (18 - 18)  SpO2: 98% (04-18-24 @ 06:49) (98% - 99%)    Orthostatic VS  04-18-24 @ 10:00  Lying BP: --/-- HR: --  Sitting BP: 114/77 HR: 90  Standing BP: 96/78 HR: 100  Site: upper left arm  Mode: --  Orthostatic VS  04-18-24 @ 08:30  Lying BP: --/-- HR: --  Sitting BP: 116/61 HR: 82  Standing BP: 91/71 HR: 110  Site: --  Mode: --  Orthostatic VS  04-17-24 @ 20:43  Lying BP: --/-- HR: --  Sitting BP: 120/86 HR: 85  Standing BP: 111/81 HR: 96  Site: --  Mode: --  Orthostatic VS  04-17-24 @ 07:20  Lying BP: --/-- HR: --  Sitting BP: 117/71 HR: 81  Standing BP: 108/80 HR: 92  Site: --  Mode: --  Orthostatic VS  04-16-24 @ 20:46  Lying BP: --/-- HR: --  Sitting BP: 123/72 HR: 111  Standing BP: 109/69 HR: 111  Site: --  Mode: --

## 2024-04-18 NOTE — BH INPATIENT PSYCHIATRY PROGRESS NOTE - OTHER
Lying in bed "Half and half" Smiling at times walking improving, using walker  psychomotor retardation  patient appears brighter

## 2024-04-18 NOTE — BH INPATIENT PSYCHIATRY PROGRESS NOTE - CURRENT MEDICATION
MEDICATIONS  (STANDING):  amLODIPine   Tablet 2.5 milliGRAM(s) Oral daily  aspirin  chewable 81 milliGRAM(s) Oral daily  atorvastatin 20 milliGRAM(s) Oral at bedtime  busPIRone 10 milliGRAM(s) Oral two times a day  FLUoxetine 40 milliGRAM(s) Oral daily  hydrochlorothiazide 25 milliGRAM(s) Oral daily  losartan 50 milliGRAM(s) Oral daily  polyethylene glycol 3350 17 Gram(s) Oral daily  senna 2 Tablet(s) Oral at bedtime  traZODone 100 milliGRAM(s) Oral at bedtime    MEDICATIONS  (PRN):  acetaminophen     Tablet .. 650 milliGRAM(s) Oral every 6 hours PRN Mild Pain (1 - 3), Moderate Pain (4 - 6), Severe Pain (7 - 10)  aluminum hydroxide/magnesium hydroxide/simethicone Suspension 30 milliLiter(s) Oral every 6 hours PRN Dyspepsia  bisacodyl 5 milliGRAM(s) Oral at bedtime PRN Constipation  LORazepam     Tablet 1 milliGRAM(s) Oral every 6 hours PRN Severe Anxiety/Agitation  LORazepam     Tablet 0.5 milliGRAM(s) Oral every 6 hours PRN Anxiety  LORazepam   Injectable 2 milliGRAM(s) IntraMuscular Once PRN severe agitation  magnesium hydroxide Suspension 30 milliLiter(s) Oral daily PRN Constipation   MEDICATIONS  (STANDING):  amLODIPine   Tablet 2.5 milliGRAM(s) Oral daily  aspirin  chewable 81 milliGRAM(s) Oral daily  atorvastatin 20 milliGRAM(s) Oral at bedtime  busPIRone 10 milliGRAM(s) Oral two times a day  FLUoxetine 40 milliGRAM(s) Oral daily  hydrochlorothiazide 25 milliGRAM(s) Oral daily  losartan 50 milliGRAM(s) Oral daily  polyethylene glycol 3350 17 Gram(s) Oral daily  senna 2 Tablet(s) Oral at bedtime  traZODone 100 milliGRAM(s) Oral at bedtime    MEDICATIONS  (PRN):  acetaminophen     Tablet .. 650 milliGRAM(s) Oral every 6 hours PRN Mild Pain (1 - 3), Moderate Pain (4 - 6), Severe Pain (7 - 10)  aluminum hydroxide/magnesium hydroxide/simethicone Suspension 30 milliLiter(s) Oral every 6 hours PRN Dyspepsia  bisacodyl 5 milliGRAM(s) Oral at bedtime PRN Constipation  LORazepam     Tablet 0.5 milliGRAM(s) Oral every 6 hours PRN Anxiety  LORazepam     Tablet 1 milliGRAM(s) Oral every 6 hours PRN Severe Anxiety/Agitation  LORazepam   Injectable 2 milliGRAM(s) IntraMuscular Once PRN severe agitation  magnesium hydroxide Suspension 30 milliLiter(s) Oral daily PRN Constipation   MEDICATIONS  (STANDING):  amLODIPine   Tablet 2.5 milliGRAM(s) Oral daily  aspirin  chewable 81 milliGRAM(s) Oral daily  atorvastatin 20 milliGRAM(s) Oral at bedtime  busPIRone 10 milliGRAM(s) Oral two times a day  FLUoxetine 40 milliGRAM(s) Oral daily  losartan 50 milliGRAM(s) Oral daily  polyethylene glycol 3350 17 Gram(s) Oral daily  senna 2 Tablet(s) Oral at bedtime  traZODone 100 milliGRAM(s) Oral at bedtime    MEDICATIONS  (PRN):  acetaminophen     Tablet .. 650 milliGRAM(s) Oral every 6 hours PRN Mild Pain (1 - 3), Moderate Pain (4 - 6), Severe Pain (7 - 10)  aluminum hydroxide/magnesium hydroxide/simethicone Suspension 30 milliLiter(s) Oral every 6 hours PRN Dyspepsia  bisacodyl 5 milliGRAM(s) Oral at bedtime PRN Constipation  LORazepam     Tablet 1 milliGRAM(s) Oral every 6 hours PRN Severe Anxiety/Agitation  LORazepam     Tablet 0.5 milliGRAM(s) Oral every 6 hours PRN Anxiety  LORazepam   Injectable 2 milliGRAM(s) IntraMuscular Once PRN severe agitation  magnesium hydroxide Suspension 30 milliLiter(s) Oral daily PRN Constipation

## 2024-04-19 LAB
ANION GAP SERPL CALC-SCNC: 12 MMOL/L — SIGNIFICANT CHANGE UP (ref 7–14)
BUN SERPL-MCNC: 18 MG/DL — SIGNIFICANT CHANGE UP (ref 7–23)
CALCIUM SERPL-MCNC: 9.7 MG/DL — SIGNIFICANT CHANGE UP (ref 8.4–10.5)
CHLORIDE SERPL-SCNC: 105 MMOL/L — SIGNIFICANT CHANGE UP (ref 98–107)
CO2 SERPL-SCNC: 27 MMOL/L — SIGNIFICANT CHANGE UP (ref 22–31)
CREAT SERPL-MCNC: 0.97 MG/DL — SIGNIFICANT CHANGE UP (ref 0.5–1.3)
EGFR: 64 ML/MIN/1.73M2 — SIGNIFICANT CHANGE UP
GLUCOSE SERPL-MCNC: 92 MG/DL — SIGNIFICANT CHANGE UP (ref 70–99)
POTASSIUM SERPL-MCNC: 3.7 MMOL/L — SIGNIFICANT CHANGE UP (ref 3.5–5.3)
POTASSIUM SERPL-SCNC: 3.7 MMOL/L — SIGNIFICANT CHANGE UP (ref 3.5–5.3)
SODIUM SERPL-SCNC: 144 MMOL/L — SIGNIFICANT CHANGE UP (ref 135–145)

## 2024-04-19 PROCEDURE — 99232 SBSQ HOSP IP/OBS MODERATE 35: CPT | Mod: FS

## 2024-04-19 PROCEDURE — 90870 ELECTROCONVULSIVE THERAPY: CPT

## 2024-04-19 RX ADMIN — Medication 40 MILLIGRAM(S): at 09:16

## 2024-04-19 RX ADMIN — Medication 100 MILLIGRAM(S): at 20:33

## 2024-04-19 RX ADMIN — Medication 10 MILLIGRAM(S): at 09:16

## 2024-04-19 RX ADMIN — Medication 81 MILLIGRAM(S): at 09:16

## 2024-04-19 RX ADMIN — SENNA PLUS 2 TABLET(S): 8.6 TABLET ORAL at 20:32

## 2024-04-19 RX ADMIN — ATORVASTATIN CALCIUM 20 MILLIGRAM(S): 80 TABLET, FILM COATED ORAL at 20:32

## 2024-04-19 RX ADMIN — Medication 10 MILLIGRAM(S): at 20:33

## 2024-04-19 NOTE — BH INPATIENT PSYCHIATRY PROGRESS NOTE - OTHER
patient appears brighter Smiling at times walking improving, using walker  Lying in bed psychomotor retardation  "Half and half" Lying in bed, sits up at times "Alright...the same"

## 2024-04-19 NOTE — BH INPATIENT PSYCHIATRY PROGRESS NOTE - NSBHATTESTCOMMENTATTENDFT_PSY_A_CORE
Care was discussed and reviewed in the interdisciplinary treatment team.  I, Nuria Lanza MD, have reviewed and verified the documentation.  I independently performed the documented medical decision making. 
done
Care was discussed and reviewed in the interdisciplinary treatment team.  I, Nuria Lanza MD, have reviewed and verified the documentation.  I independently performed the documented medical decision making.
Care was discussed and reviewed in the interdisciplinary treatment team.  I, Nuria Lanza MD, have reviewed and verified the documentation.  I independently performed the documented medical decision making.    Patient approach this writer and informed me that she has decided to do the ECT treatments. Patient also said that she wanted to discuss the ECT with her NP. NP has initiated the process of clearing the patient for ECT. We have discuss in detail the treatment plan, including medications.
Care was discussed and reviewed in the interdisciplinary treatment team.  I, Nuria Lanza MD, have reviewed and verified the documentation.  I independently performed the documented medical decision making.

## 2024-04-19 NOTE — BH INPATIENT PSYCHIATRY PROGRESS NOTE - CURRENT MEDICATION
MEDICATIONS  (STANDING):  amLODIPine   Tablet 2.5 milliGRAM(s) Oral daily  aspirin  chewable 81 milliGRAM(s) Oral daily  atorvastatin 20 milliGRAM(s) Oral at bedtime  busPIRone 10 milliGRAM(s) Oral two times a day  FLUoxetine 40 milliGRAM(s) Oral daily  losartan 50 milliGRAM(s) Oral daily  polyethylene glycol 3350 17 Gram(s) Oral daily  senna 2 Tablet(s) Oral at bedtime  traZODone 100 milliGRAM(s) Oral at bedtime    MEDICATIONS  (PRN):  acetaminophen     Tablet .. 650 milliGRAM(s) Oral every 6 hours PRN Mild Pain (1 - 3), Moderate Pain (4 - 6), Severe Pain (7 - 10)  aluminum hydroxide/magnesium hydroxide/simethicone Suspension 30 milliLiter(s) Oral every 6 hours PRN Dyspepsia  bisacodyl 5 milliGRAM(s) Oral at bedtime PRN Constipation  LORazepam     Tablet 1 milliGRAM(s) Oral every 6 hours PRN Severe Anxiety/Agitation  LORazepam     Tablet 0.5 milliGRAM(s) Oral every 6 hours PRN Anxiety  LORazepam   Injectable 2 milliGRAM(s) IntraMuscular Once PRN severe agitation  magnesium hydroxide Suspension 30 milliLiter(s) Oral daily PRN Constipation   MEDICATIONS  (STANDING):  amLODIPine   Tablet 2.5 milliGRAM(s) Oral daily  aspirin  chewable 81 milliGRAM(s) Oral daily  atorvastatin 20 milliGRAM(s) Oral at bedtime  busPIRone 10 milliGRAM(s) Oral two times a day  FLUoxetine 40 milliGRAM(s) Oral daily  losartan 50 milliGRAM(s) Oral daily  polyethylene glycol 3350 17 Gram(s) Oral daily  senna 2 Tablet(s) Oral at bedtime  traZODone 100 milliGRAM(s) Oral at bedtime    MEDICATIONS  (PRN):  acetaminophen     Tablet .. 650 milliGRAM(s) Oral every 6 hours PRN Mild Pain (1 - 3), Moderate Pain (4 - 6), Severe Pain (7 - 10)  aluminum hydroxide/magnesium hydroxide/simethicone Suspension 30 milliLiter(s) Oral every 6 hours PRN Dyspepsia  bisacodyl 5 milliGRAM(s) Oral at bedtime PRN Constipation  LORazepam     Tablet 0.5 milliGRAM(s) Oral every 6 hours PRN Anxiety  LORazepam     Tablet 1 milliGRAM(s) Oral every 6 hours PRN Severe Anxiety/Agitation  LORazepam   Injectable 2 milliGRAM(s) IntraMuscular Once PRN severe agitation  magnesium hydroxide Suspension 30 milliLiter(s) Oral daily PRN Constipation

## 2024-04-19 NOTE — BH INPATIENT PSYCHIATRY PROGRESS NOTE - NSBHCHARTREVIEWVS_PSY_A_CORE FT
Vital Signs Last 24 Hrs  T(C): 36.7 (04-19-24 @ 07:12), Max: 36.7 (04-19-24 @ 07:12)  T(F): 98 (04-19-24 @ 07:12), Max: 98 (04-19-24 @ 07:12)  HR: --  BP: --  BP(mean): --  RR: 17 (04-19-24 @ 07:12) (17 - 18)  SpO2: 98% (04-19-24 @ 06:04) (98% - 99%)    Orthostatic VS  04-19-24 @ 07:12  Lying BP: --/-- HR: --  Sitting BP: 111/70 HR: 75  Standing BP: 106/65 HR: 70  Site: --  Mode: --  Orthostatic VS  04-18-24 @ 20:01  Lying BP: --/-- HR: --  Sitting BP: 140/79 HR: 73  Standing BP: 128/84 HR: 96  Site: --  Mode: --  Orthostatic VS  04-18-24 @ 10:00  Lying BP: --/-- HR: --  Sitting BP: 114/77 HR: 90  Standing BP: 96/78 HR: 100  Site: upper left arm  Mode: --  Orthostatic VS  04-18-24 @ 08:30  Lying BP: --/-- HR: --  Sitting BP: 116/61 HR: 82  Standing BP: 91/71 HR: 110  Site: --  Mode: --  Orthostatic VS  04-17-24 @ 20:43  Lying BP: --/-- HR: --  Sitting BP: 120/86 HR: 85  Standing BP: 111/81 HR: 96  Site: --  Mode: --   Vital Signs Last 24 Hrs  T(C): 36.7 (04-19-24 @ 07:12), Max: 36.7 (04-19-24 @ 07:12)  T(F): 98 (04-19-24 @ 07:12), Max: 98 (04-19-24 @ 07:12)  HR: --  BP: --  BP(mean): --  RR: 17 (04-19-24 @ 07:12) (17 - 17)  SpO2: 98% (04-19-24 @ 06:04) (98% - 99%)    Orthostatic VS  04-19-24 @ 07:12  Lying BP: --/-- HR: --  Sitting BP: 111/70 HR: 75  Standing BP: 106/65 HR: 70  Site: --  Mode: --  Orthostatic VS  04-18-24 @ 20:01  Lying BP: --/-- HR: --  Sitting BP: 140/79 HR: 73  Standing BP: 128/84 HR: 96  Site: --  Mode: --  Orthostatic VS  04-18-24 @ 10:00  Lying BP: --/-- HR: --  Sitting BP: 114/77 HR: 90  Standing BP: 96/78 HR: 100  Site: upper left arm  Mode: --  Orthostatic VS  04-18-24 @ 08:30  Lying BP: --/-- HR: --  Sitting BP: 116/61 HR: 82  Standing BP: 91/71 HR: 110  Site: --  Mode: --  Orthostatic VS  04-17-24 @ 20:43  Lying BP: --/-- HR: --  Sitting BP: 120/86 HR: 85  Standing BP: 111/81 HR: 96  Site: --  Mode: --

## 2024-04-19 NOTE — BH INPATIENT PSYCHIATRY PROGRESS NOTE - NSBHATTESTTYPESTAFF_PSY_A_CORE
Student

## 2024-04-19 NOTE — BH INPATIENT PSYCHIATRY PROGRESS NOTE - NSBHFUPINTERVALHXFT_PSY_A_CORE
Patient seen for management of MDD and anxiety by MS3 in patient's room. Case discussed with care team. Patient NPO for ECT #7 today. VSS. No events overnight. Patient reports mood is "alright", denies  Patient seen for management of MDD and anxiety by MS3 in patient's room. Case discussed with care team. Patient NPO for ECT #7 today. VSS. No events overnight. Patient reports mood is "alright...the same", denies depression and anxiety. She continues to feel better than when she arrives but says, "I still don't feel right yet" when asked if she feels ready to return home. She also expresses concern that she will "fall into the same patterns" that brought her here, such as not being able to take care of herself and feeling increasingly depressed. She does note that she is looking forward to activities outside of the hospital such as getting her hair done. She feels the ECT is working. She endorses mild shakiness today, no brain fog. Denies medication side effects, including nausea, constipation, headache, dizziness. Denies SI/I/P.

## 2024-04-19 NOTE — BH INPATIENT PSYCHIATRY PROGRESS NOTE - NSBHASSESSSUMMFT_PSY_ALL_CORE
69-year-old female, , no children, retired 3 years ago (was ), domiciled with  in a private residence. PPHx of Depression and LEO, engaged in outpatient tx with Dr Schmid x20+ years and therapist Cole Romero. No previous psychiatric admissions, denies previous suicide attempts, denies NSSIB. Pt denies all substance use. Pt denies legal issues and denies hx of violence/aggression. Denies access to firearms. Pertinent PMH HLD, HTN, IBS, BONIFACIO has CPAP (has not used for months). Referred by [facility] for poor functioning and inability to care for oneself due to worsening anxiety and depression. Patient reports symptoms of depression including dysphoric mood, diminished interest in activities, and fatigue/little energy that prevents her from completing ADLs. She reports recent history of 20 lb weight loss "months ago" and states that she gained back 10 lbs. Additionally reports severe anxiety that prevents her from leaving the house, states that she last left her home ~1 month ago. Denies AVH. Denies SI/I/P. Displays insight into need for pharmacologic treatment for depression/anxiety.    Hospital course: Patient admitted to 2[facility]. Started on home psychiatric regimen of klonopin 1mg TID, rexulti 1.5mg qD, buspirone 20mg qD, hydroxyzine 10mg TID, trazodone 100 mg QHS. Started on Prozac 10mg qD, gradually increased to 40mg. Klonopin and hydroxyzine d/jacinda, buspirone decreased. ECT started on 4/16 with improvement in symptoms. Rexulti d/jacinda 4/11 due to side effects (shakiness). MOCA on 3/26 (prior to ECT #1) - 15, MOCA on 4/10 (prior to ECT #4) - 21, MOCA on 4/15 (prior to ECT #6) - 25.     Patient orthostatic today, encouraged hydration. Denies depression and anxiety, denies brain fog and shakiness with spacing of ECT sessions. Plan for ECT #7 tomorrow. Denies AV hallucinations, denies SI/I/P.     Plan:  1. Admit to [facility], 9.13 legal status.    2. Routine observation with q 15 minute checks. Patient denies suicidality, remains in behavioral control.    3. Psych: continue home medication regimen pending collateral.  ECT #7 on Friday  MOCA 4/15 - 25  MOCA 4/10 - 21  MOCA 3/26 - 15  Prozac 40 mg daily  Buspirone 10 mg BID  Trazodone 100 mg QHS  D/C Rexulti  --PRN Ativan 2 mg IM for severe agitation    4. Medical: continue home medication regimen. Labs reviewed.  Amlodipine 2.5 mg daily   Aspirin 81 mg daily  Losartan 50 mg daily  Hydrochlorothiazide 25 mg po daily  Pravastatin 20 mg po daily  B complex/vitamin C/multivitamin    5. Encourage individual, group, and milieu therapy.    6. Collaboration with outpatient psychiatrist Dr. Chester Schmid, [phone].    7. Dispo collaboration with social work. 69-year-old female, , no children, retired 3 years ago (was ), domiciled with  in a private residence. PPHx of Depression and LEO, engaged in outpatient tx with Dr Schmid x20+ years and therapist Cole Romero. No previous psychiatric admissions, denies previous suicide attempts, denies NSSIB. Pt denies all substance use. Pt denies legal issues and denies hx of violence/aggression. Denies access to firearms. Pertinent PMH HLD, HTN, IBS, BONIFACIO has CPAP (has not used for months). Referred by [facility] for poor functioning and inability to care for oneself due to worsening anxiety and depression. Patient reports symptoms of depression including dysphoric mood, diminished interest in activities, and fatigue/little energy that prevents her from completing ADLs. She reports recent history of 20 lb weight loss "months ago" and states that she gained back 10 lbs. Additionally reports severe anxiety that prevents her from leaving the house, states that she last left her home ~1 month ago. Denies AVH. Denies SI/I/P. Displays insight into need for pharmacologic treatment for depression/anxiety.    Hospital course: Patient admitted to 2[facility]. Started on home psychiatric regimen of klonopin 1mg TID, rexulti 1.5mg qD, buspirone 20mg qD, hydroxyzine 10mg TID, trazodone 100 mg QHS. Started on Prozac 10mg qD, gradually increased to 40mg. Klonopin and hydroxyzine d/jacinda, buspirone decreased. ECT started on 4/16 with improvement in symptoms. Rexulti d/jacinda 4/11 due to side effects (shakiness). MOCA on 3/26 (prior to ECT #1) - 15, MOCA on 4/10 (prior to ECT #4) - 21, MOCA on 4/15 (prior to ECT #6) - 25.     Patient NPO for ECT #7 today. VSS. Denies depression and anxiety. Denies medication side effects. Denies SI/I/P.     Plan:  1. Admit to [facility], 9.13 legal status.    2. Routine observation with q 15 minute checks. Patient denies suicidality, remains in behavioral control.    3. Psych: continue home medication regimen pending collateral.  ECT #7 today  MOCA 4/15 - 25  MOCA 4/10 - 21  MOCA 3/26 - 15  Prozac 40 mg daily  Buspirone 10 mg BID  Trazodone 100 mg QHS  D/C Rexulti  --PRN Ativan 2 mg IM for severe agitation    4. Medical: continue home medication regimen. Labs reviewed.  Amlodipine 2.5 mg daily   Aspirin 81 mg daily  Losartan 50 mg daily  Hydrochlorothiazide 25 mg po daily  Pravastatin 20 mg po daily  B complex/vitamin C/multivitamin    5. Encourage individual, group, and milieu therapy.    6. Collaboration with outpatient psychiatrist Dr. Chester Schmid, [phone].    7. Dispo collaboration with social work.

## 2024-04-20 RX ADMIN — Medication 40 MILLIGRAM(S): at 08:39

## 2024-04-20 RX ADMIN — Medication 100 MILLIGRAM(S): at 21:42

## 2024-04-20 RX ADMIN — ATORVASTATIN CALCIUM 20 MILLIGRAM(S): 80 TABLET, FILM COATED ORAL at 21:42

## 2024-04-20 RX ADMIN — AMLODIPINE BESYLATE 2.5 MILLIGRAM(S): 2.5 TABLET ORAL at 08:39

## 2024-04-20 RX ADMIN — Medication 10 MILLIGRAM(S): at 08:39

## 2024-04-20 RX ADMIN — SENNA PLUS 2 TABLET(S): 8.6 TABLET ORAL at 21:42

## 2024-04-20 RX ADMIN — Medication 81 MILLIGRAM(S): at 08:39

## 2024-04-20 RX ADMIN — LOSARTAN POTASSIUM 50 MILLIGRAM(S): 100 TABLET, FILM COATED ORAL at 08:39

## 2024-04-20 RX ADMIN — Medication 10 MILLIGRAM(S): at 21:44

## 2024-04-21 RX ADMIN — LOSARTAN POTASSIUM 50 MILLIGRAM(S): 100 TABLET, FILM COATED ORAL at 08:43

## 2024-04-21 RX ADMIN — Medication 10 MILLIGRAM(S): at 20:39

## 2024-04-21 RX ADMIN — ATORVASTATIN CALCIUM 20 MILLIGRAM(S): 80 TABLET, FILM COATED ORAL at 20:40

## 2024-04-21 RX ADMIN — Medication 100 MILLIGRAM(S): at 20:41

## 2024-04-21 RX ADMIN — Medication 81 MILLIGRAM(S): at 08:43

## 2024-04-21 RX ADMIN — Medication 10 MILLIGRAM(S): at 08:42

## 2024-04-21 RX ADMIN — SENNA PLUS 2 TABLET(S): 8.6 TABLET ORAL at 20:40

## 2024-04-21 RX ADMIN — Medication 40 MILLIGRAM(S): at 08:42

## 2024-04-21 RX ADMIN — AMLODIPINE BESYLATE 2.5 MILLIGRAM(S): 2.5 TABLET ORAL at 08:43

## 2024-04-22 DIAGNOSIS — F41.1 GENERALIZED ANXIETY DISORDER: ICD-10-CM

## 2024-04-22 DIAGNOSIS — F33.1 MAJOR DEPRESSIVE DISORDER, RECURRENT, MODERATE: ICD-10-CM

## 2024-04-22 PROCEDURE — 99232 SBSQ HOSP IP/OBS MODERATE 35: CPT | Mod: 25

## 2024-04-22 PROCEDURE — 90870 ELECTROCONVULSIVE THERAPY: CPT

## 2024-04-22 RX ADMIN — Medication 10 MILLIGRAM(S): at 09:08

## 2024-04-22 RX ADMIN — ATORVASTATIN CALCIUM 20 MILLIGRAM(S): 80 TABLET, FILM COATED ORAL at 20:30

## 2024-04-22 RX ADMIN — Medication 40 MILLIGRAM(S): at 09:07

## 2024-04-22 RX ADMIN — Medication 100 MILLIGRAM(S): at 20:30

## 2024-04-22 RX ADMIN — AMLODIPINE BESYLATE 2.5 MILLIGRAM(S): 2.5 TABLET ORAL at 09:08

## 2024-04-22 RX ADMIN — LOSARTAN POTASSIUM 50 MILLIGRAM(S): 100 TABLET, FILM COATED ORAL at 09:08

## 2024-04-22 RX ADMIN — SENNA PLUS 2 TABLET(S): 8.6 TABLET ORAL at 20:30

## 2024-04-22 RX ADMIN — Medication 10 MILLIGRAM(S): at 20:31

## 2024-04-22 RX ADMIN — Medication 81 MILLIGRAM(S): at 09:08

## 2024-04-22 NOTE — ECT PRE-PROCEDURE CHECKLIST - AS BP NONINV SITE
left upper arm

## 2024-04-22 NOTE — BH INPATIENT PSYCHIATRY PROGRESS NOTE - NSBHASSESSSUMMFT_PSY_ALL_CORE
69-year-old female, , no children, retired 3 years ago (was ), domiciled with  in a private residence. PPHx of Depression and LEO, engaged in outpatient tx with Dr Schmid x20+ years and therapist Cole Romero. No previous psychiatric admissions, denies previous suicide attempts, denies NSSIB. Pt denies all substance use. Pt denies legal issues and denies hx of violence/aggression. Denies access to firearms. Pertinent PMH HLD, HTN, IBS, BONIFACIO has CPAP (has not used for months). Referred by [facility] for poor functioning and inability to care for oneself due to worsening anxiety and depression. Patient reports symptoms of depression including dysphoric mood, diminished interest in activities, and fatigue/little energy that prevents her from completing ADLs. She reports recent history of 20 lb weight loss "months ago" and states that she gained back 10 lbs. Additionally reports severe anxiety that prevents her from leaving the house, states that she last left her home ~1 month ago. Denies AVH. Denies SI/I/P. Displays insight into need for pharmacologic treatment for depression/anxiety.    Hospital course: Patient admitted to 2[facility]. Started on home psychiatric regimen of klonopin 1mg TID, rexulti 1.5mg qD, buspirone 20mg qD, hydroxyzine 10mg TID, trazodone 100 mg QHS. Started on Prozac 10mg qD, gradually increased to 40mg. Klonopin and hydroxyzine d/jacinda, buspirone decreased. ECT started on 4/16 with improvement in symptoms. Rexulti d/jacinda 4/11 due to side effects (shakiness). MOCA on 3/26 (prior to ECT #1) - 15, MOCA on 4/10 (prior to ECT #4) - 21, MOCA on 4/15 (prior to ECT #6) - 25.     Patient NPO for ECT #8 today. VSS. Denies depression but endorses anxiety. Denies medication side effects. Denies SI/I/P.     Plan:  1. Admit to [facility] [place], 9.13 legal status.    2. Routine observation with q 15 minute checks. Patient denies suicidality, remains in behavioral control.    3. Psych: continue home medication regimen pending collateral.  ECT #8 today  MOCA 4/15 - 25  MOCA 4/10 - 21  MOCA 3/26 - 15  Prozac 40 mg daily  Buspirone 10 mg BID  Trazodone 100 mg QHS  D/C Rexulti  --PRN Ativan 2 mg IM for severe agitation    4. Medical: continue home medication regimen. Labs reviewed.  Amlodipine 2.5 mg daily   Aspirin 81 mg daily  Losartan 50 mg daily  Hydrochlorothiazide 25 mg po daily  Pravastatin 20 mg po daily  B complex/vitamin C/multivitamin    5. Encourage individual, group, and milieu therapy.    6. Collaboration with outpatient psychiatrist Dr. Chester Schmid, [phone].    7. Dispo collaboration with social work.

## 2024-04-22 NOTE — BH INPATIENT PSYCHIATRY PROGRESS NOTE - NSBHCHARTREVIEWVS_PSY_A_CORE FT
Vital Signs Last 24 Hrs  T(C): 36.6 (04-22-24 @ 15:17), Max: 36.9 (04-21-24 @ 20:39)  T(F): 97.9 (04-22-24 @ 15:17), Max: 98.5 (04-21-24 @ 20:39)  HR: 73 (04-22-24 @ 15:17) (73 - 73)  BP: 138/76 (04-22-24 @ 15:17) (138/76 - 138/76)  BP(mean): --  RR: 18 (04-22-24 @ 15:17) (17 - 18)  SpO2: 98% (04-22-24 @ 15:17) (98% - 98%)    Orthostatic VS  04-22-24 @ 07:46  Lying BP: --/-- HR: --  Sitting BP: 105/79 HR: 87  Standing BP: 105/64 HR: 99  Site: --  Mode: --  Orthostatic VS  04-21-24 @ 20:39  Lying BP: --/-- HR: --  Sitting BP: 104/80 HR: 92  Standing BP: 100/74 HR: 104  Site: --  Mode: --  Orthostatic VS  04-21-24 @ 07:43  Lying BP: --/-- HR: --  Sitting BP: 154/86 HR: 84  Standing BP: 148/92 HR: 88  Site: --  Mode: --  Orthostatic VS  04-20-24 @ 20:40  Lying BP: --/-- HR: --  Sitting BP: 115/55 HR: 84  Standing BP: 112/60 HR: 90  Site: --  Mode: --

## 2024-04-22 NOTE — BH INPATIENT PSYCHIATRY PROGRESS NOTE - NSBHMETABOLIC_PSY_ALL_CORE_FT
BMI: BMI (kg/m2): 33.6 (03-22-24 @ 12:45)  HbA1c: A1C with Estimated Average Glucose Result: 5.1 % (04-17-24 @ 08:00)    Glucose:   BP: 138/76 (04-22-24 @ 15:17) (138/76 - 138/76)Vital Signs Last 24 Hrs  T(C): 36.6 (04-22-24 @ 15:17), Max: 36.9 (04-21-24 @ 20:39)  T(F): 97.9 (04-22-24 @ 15:17), Max: 98.5 (04-21-24 @ 20:39)  HR: 73 (04-22-24 @ 15:17) (73 - 73)  BP: 138/76 (04-22-24 @ 15:17) (138/76 - 138/76)  BP(mean): --  RR: 18 (04-22-24 @ 15:17) (17 - 18)  SpO2: 98% (04-22-24 @ 15:17) (98% - 98%)    Orthostatic VS  04-22-24 @ 07:46  Lying BP: --/-- HR: --  Sitting BP: 105/79 HR: 87  Standing BP: 105/64 HR: 99  Site: --  Mode: --  Orthostatic VS  04-21-24 @ 20:39  Lying BP: --/-- HR: --  Sitting BP: 104/80 HR: 92  Standing BP: 100/74 HR: 104  Site: --  Mode: --  Orthostatic VS  04-21-24 @ 07:43  Lying BP: --/-- HR: --  Sitting BP: 154/86 HR: 84  Standing BP: 148/92 HR: 88  Site: --  Mode: --  Orthostatic VS  04-20-24 @ 20:40  Lying BP: --/-- HR: --  Sitting BP: 115/55 HR: 84  Standing BP: 112/60 HR: 90  Site: --  Mode: --    Lipid Panel: Date/Time: 04-17-24 @ 08:00  Cholesterol, Serum: 129  LDL Cholesterol Calculated: 59  HDL Cholesterol, Serum: 56  Total Cholesterol/HDL Ration Measurement: --  Triglycerides, Serum: 70

## 2024-04-22 NOTE — BH INPATIENT PSYCHIATRY PROGRESS NOTE - CURRENT MEDICATION
MEDICATIONS  (STANDING):  amLODIPine   Tablet 2.5 milliGRAM(s) Oral daily  aspirin  chewable 81 milliGRAM(s) Oral daily  atorvastatin 20 milliGRAM(s) Oral at bedtime  busPIRone 10 milliGRAM(s) Oral two times a day  FLUoxetine 40 milliGRAM(s) Oral daily  losartan 50 milliGRAM(s) Oral daily  polyethylene glycol 3350 17 Gram(s) Oral daily  senna 2 Tablet(s) Oral at bedtime  traZODone 100 milliGRAM(s) Oral at bedtime    MEDICATIONS  (PRN):  acetaminophen     Tablet .. 650 milliGRAM(s) Oral every 6 hours PRN Mild Pain (1 - 3), Moderate Pain (4 - 6), Severe Pain (7 - 10)  aluminum hydroxide/magnesium hydroxide/simethicone Suspension 30 milliLiter(s) Oral every 6 hours PRN Dyspepsia  bisacodyl 5 milliGRAM(s) Oral at bedtime PRN Constipation  LORazepam     Tablet 0.5 milliGRAM(s) Oral every 6 hours PRN Anxiety  LORazepam     Tablet 1 milliGRAM(s) Oral every 6 hours PRN Severe Anxiety/Agitation  LORazepam   Injectable 2 milliGRAM(s) IntraMuscular Once PRN severe agitation  magnesium hydroxide Suspension 30 milliLiter(s) Oral daily PRN Constipation

## 2024-04-22 NOTE — BH INPATIENT PSYCHIATRY PROGRESS NOTE - OTHER
walking improving, using walker  Lying in bed, sits up at times "Alright...the same" Smiling at times psychomotor retardation

## 2024-04-22 NOTE — BH INPATIENT PSYCHIATRY PROGRESS NOTE - NSBHFUPINTERVALHXFT_PSY_A_CORE
Patient seen and evaluated for management of MDD and anxiety in patient's room. Case discussed with treatment team, chart is reviewed.  No issues overnight Patient NPO for ECT #8 today. VSS. No events overnight. Patient reports mood is "anxious", denies depression.  She continues to feel better than when she arrived but still with noted anxiety symptoms. She expresses concern of not being able to take care of herself once she leaves the hospital.  She feels the ECT has been helpful and is encouraged to see how she feels after today's treatment. Denies medication side effects, including nausea, constipation, headache, dizziness. Denies SI/I/P.  No somatic complaints.  No agitation and in good behavioral control.

## 2024-04-23 PROCEDURE — 99232 SBSQ HOSP IP/OBS MODERATE 35: CPT | Mod: FS

## 2024-04-23 PROCEDURE — 99238 HOSP IP/OBS DSCHRG MGMT 30/<: CPT

## 2024-04-23 RX ORDER — ASPIRIN/CALCIUM CARB/MAGNESIUM 324 MG
1 TABLET ORAL
Qty: 30 | Refills: 0
Start: 2024-04-23 | End: 2024-05-22

## 2024-04-23 RX ORDER — LOSARTAN POTASSIUM 100 MG/1
1 TABLET, FILM COATED ORAL
Qty: 30 | Refills: 0
Start: 2024-04-23 | End: 2024-05-22

## 2024-04-23 RX ORDER — ATORVASTATIN CALCIUM 80 MG/1
1 TABLET, FILM COATED ORAL
Qty: 30 | Refills: 0
Start: 2024-04-23 | End: 2024-05-22

## 2024-04-23 RX ORDER — TRAZODONE HCL 50 MG
1 TABLET ORAL
Qty: 30 | Refills: 0
Start: 2024-04-23 | End: 2024-05-22

## 2024-04-23 RX ORDER — AMLODIPINE BESYLATE 2.5 MG/1
1 TABLET ORAL
Qty: 30 | Refills: 0
Start: 2024-04-23 | End: 2024-05-22

## 2024-04-23 RX ORDER — FLUOXETINE HCL 10 MG
1 CAPSULE ORAL
Qty: 30 | Refills: 0
Start: 2024-04-23 | End: 2024-05-22

## 2024-04-23 RX ADMIN — Medication 100 MILLIGRAM(S): at 22:02

## 2024-04-23 RX ADMIN — Medication 10 MILLIGRAM(S): at 22:02

## 2024-04-23 RX ADMIN — LOSARTAN POTASSIUM 50 MILLIGRAM(S): 100 TABLET, FILM COATED ORAL at 08:53

## 2024-04-23 RX ADMIN — Medication 10 MILLIGRAM(S): at 08:53

## 2024-04-23 RX ADMIN — SENNA PLUS 2 TABLET(S): 8.6 TABLET ORAL at 22:02

## 2024-04-23 RX ADMIN — Medication 81 MILLIGRAM(S): at 08:54

## 2024-04-23 RX ADMIN — Medication 40 MILLIGRAM(S): at 08:53

## 2024-04-23 RX ADMIN — AMLODIPINE BESYLATE 2.5 MILLIGRAM(S): 2.5 TABLET ORAL at 08:53

## 2024-04-23 RX ADMIN — ATORVASTATIN CALCIUM 20 MILLIGRAM(S): 80 TABLET, FILM COATED ORAL at 22:01

## 2024-04-23 NOTE — BH INPATIENT PSYCHIATRY DISCHARGE NOTE - NSBHDCHANDOFFFT_PSY_ALL_CORE
Discharge summary faxed to Dr Schmid. Discussed treatment plan and meds included my contact information Sirena Flores NP, [phone].

## 2024-04-23 NOTE — BH TREATMENT PLAN - NSTXDEPRESINTERRN_PSY_ALL_CORE
Provide education on coping skills that aid in mitigating feelings of depression; encourage use of coping skills after education is provided by staff when pt is feeling depressed; redirect as needed/indicated
Assess mood and monitor for suicidal ideation  Maintain safe environment  Encourage patient to express feelings  Encourage daily participation in therapeutic groups
Provide education on coping skills that aid in mitigating feelings of depression; encourage use of coping skills after education is provided by staff when pt is feeling depressed; redirect as needed/indicated

## 2024-04-23 NOTE — BH INPATIENT PSYCHIATRY DISCHARGE NOTE - NSBHMETABOLIC_PSY_ALL_CORE_FT
BMI: BMI (kg/m2): 33.6 (03-22-24 @ 12:45)  HbA1c: A1C with Estimated Average Glucose Result: 5.1 % (04-17-24 @ 08:00)    Glucose:   BP: 156/72 (04-22-24 @ 16:59) (124/72 - 156/72)Vital Signs Last 24 Hrs  T(C): 36.8 (04-23-24 @ 07:35), Max: 36.8 (04-23-24 @ 07:35)  T(F): 98.3 (04-23-24 @ 07:35), Max: 98.3 (04-23-24 @ 07:35)  HR: --  BP: --  BP(mean): --  RR: 19 (04-23-24 @ 07:35) (17 - 19)  SpO2: 99% (04-23-24 @ 04:00) (99% - 99%)    Orthostatic VS  04-23-24 @ 07:35  Lying BP: --/-- HR: --  Sitting BP: 121/84 HR: 91  Standing BP: 120/74 HR: 99  Site: --  Mode: --  Orthostatic VS  04-22-24 @ 20:30  Lying BP: --/-- HR: --  Sitting BP: 133/84 HR: 83  Standing BP: 128/81 HR: 91  Site: --  Mode: --  Orthostatic VS  04-22-24 @ 07:46  Lying BP: --/-- HR: --  Sitting BP: 105/79 HR: 87  Standing BP: 105/64 HR: 99  Site: --  Mode: --  Orthostatic VS  04-21-24 @ 20:39  Lying BP: --/-- HR: --  Sitting BP: 104/80 HR: 92  Standing BP: 100/74 HR: 104  Site: --  Mode: --    Lipid Panel: Date/Time: 04-17-24 @ 08:00  Cholesterol, Serum: 129  LDL Cholesterol Calculated: 59  HDL Cholesterol, Serum: 56  Total Cholesterol/HDL Ration Measurement: --  Triglycerides, Serum: 70

## 2024-04-23 NOTE — BH TREATMENT PLAN - NSBHPRIMARYDX_PSY_ALL_CORE
Severe episode of recurrent major depressive disorder, without psychotic features    

## 2024-04-23 NOTE — BH INPATIENT PSYCHIATRY DISCHARGE NOTE - ATTENDING DISCHARGE PHYSICAL EXAMINATION:
Care was discussed and reviewed in the interdisciplinary treatment team.  I, Jovanny Flores MD, have reviewed and verified the documentation.  I independently performed the documented medical decision making.  The patient has continued to show improvement in symptoms.  She states her depression is much improved, and she denies any anxiety.  She denies any SI, and her behavior has been well controlled.  The patient has been sleeping better, without nightmares.  The patient has been fully engaged in treatment on the unit, compliant with medications, and is looking forward to continuing care as an outpatient.  The patient has support from her family, who are also protective factors for her.  She was able to safety plan appropriately.  The patient’s risk cannot be further decreased with continued inpatient hospitalization.  She is no longer an acute danger to herself or others, and is stable for discharge home.

## 2024-04-23 NOTE — BH TREATMENT PLAN - NSTXDEPRESINTERMD_PSY_ALL_CORE
Medication management, Group and milieu therapy 

## 2024-04-23 NOTE — BH INPATIENT PSYCHIATRY PROGRESS NOTE - CURRENT MEDICATION
MEDICATIONS  (STANDING):  amLODIPine   Tablet 2.5 milliGRAM(s) Oral daily  aspirin  chewable 81 milliGRAM(s) Oral daily  atorvastatin 20 milliGRAM(s) Oral at bedtime  busPIRone 10 milliGRAM(s) Oral two times a day  FLUoxetine 40 milliGRAM(s) Oral daily  losartan 50 milliGRAM(s) Oral daily  polyethylene glycol 3350 17 Gram(s) Oral daily  senna 2 Tablet(s) Oral at bedtime  traZODone 100 milliGRAM(s) Oral at bedtime    MEDICATIONS  (PRN):  acetaminophen     Tablet .. 650 milliGRAM(s) Oral every 6 hours PRN Mild Pain (1 - 3), Moderate Pain (4 - 6), Severe Pain (7 - 10)  aluminum hydroxide/magnesium hydroxide/simethicone Suspension 30 milliLiter(s) Oral every 6 hours PRN Dyspepsia  bisacodyl 5 milliGRAM(s) Oral at bedtime PRN Constipation  LORazepam     Tablet 1 milliGRAM(s) Oral every 6 hours PRN Severe Anxiety/Agitation  LORazepam     Tablet 0.5 milliGRAM(s) Oral every 6 hours PRN Anxiety  LORazepam   Injectable 2 milliGRAM(s) IntraMuscular Once PRN severe agitation  magnesium hydroxide Suspension 30 milliLiter(s) Oral daily PRN Constipation

## 2024-04-23 NOTE — BH INPATIENT PSYCHIATRY PROGRESS NOTE - NSBHFUPINTERVALHXFT_PSY_A_CORE
Patient seen and evaluated for management of MDD and anxiety in patient's room. Case discussed with treatment team, chart is reviewed.  No issues overnight Patient NPO for ECT #8 today. VSS. No events overnight. Patient reports mood is "better", denies depression.  She continues to feel better than when she arrived but anxious about going home and if she will be able to perform as required. Patient is looking forward to getting her hair done. Discussed continuing ECT upon discharge, feels it has been helpful. Next ECT will be Friday. Patient's ambulation has improved, PT evaluated today. Denies medication side effects, including nausea, constipation, headache, dizziness. Denies SI/I/P.  No somatic complaints.  No agitation and in good behavioral control.    Spoke with Kevin, , inquired about discharge and looking to obtain information for outpatient provider. Informed about Discharge on Friday with plan for ambulatory ECT. He was in agreement.

## 2024-04-23 NOTE — BH INPATIENT PSYCHIATRY DISCHARGE NOTE - DESCRIPTION
69-year-old female, , no children/dependents. Domiciled in private residence with . Patient retired ~2-3 years ago (unable to specify) and was previously employed as a . Also used to work as a volunteer at [employer].

## 2024-04-23 NOTE — BH TREATMENT PLAN - NSTXCOPEINTERPR_PSY_ALL_CORE
Writer collaborated with pt in choosing an appropriate psychiatric rehabilitation goal. Writer encouraged pt to engage in treatment and programming to faciliate progress.
The writer met with the patient to assess progress of their psychiatric rehabilitation goal over the past week. The patient made minimal improvement towards her goal to identify and utilize 2 coping skills to meet her needs for her coping ineffective goal. The patient reported sleeping as her coping skill. The patient is compliant with her medications, does not engage with her peers, and has fair ADL’s and behavioral control. Over the next seven days, Psychiatric Rehabilitation staff will continue to provide encouragement, support, psychotherapy, and psychoeducation to assist the patient in the progression of her treatment goal and engagement with activities. The patient denied SI/HI/AH/VH.
Over the next seven days, Psychiatric Rehabilitation staff will continue to provide encouragement, support, psychotherapy, and psychoeducation to assist the Pt in the progression of her treatment goal and engagement with activities.
Over the next week, Psychiatric Rehabilitation staff will continue to provide encouragement, support, psychotherapy, and psychoeducation to assist the Pt in the progression of her treatment goal and engagement with activities.

## 2024-04-23 NOTE — BH INPATIENT PSYCHIATRY DISCHARGE NOTE - NSBHFUPINTERVALHXFT_PSY_A_CORE
Patient seen and evaluated for management of MDD and anxiety in patient's room. Case discussed with treatment team, chart is reviewed.  No issues overnight Patient NPO for ECT #8 today. VSS. No events overnight. Patient reports mood is "better", denies depression.  She continues to feel better than when she arrived but anxious about going home and if she will be able to perform as required. Patient is looking forward to getting her hair done. Discussed continuing ECT upon discharge, feels it has been helpful. Next ECT will be Friday. Patient's ambulation has improved, PT evaluated today. Denies medication side effects, including nausea, constipation, headache, dizziness. Denies SI/I/P.  No somatic complaints.  No agitation and in good behavioral control.

## 2024-04-23 NOTE — BH TREATMENT PLAN - NSTXANXGOAL_PSY_ALL_CORE
Identify and practice 3 coping skills to manage anxiety
Be able to participate in activities despite lingering anxiety/panic

## 2024-04-23 NOTE — BH INPATIENT PSYCHIATRY DISCHARGE NOTE - OTHER PAST PSYCHIATRIC HISTORY (INCLUDE DETAILS REGARDING ONSET, COURSE OF ILLNESS, INPATIENT/OUTPATIENT TREATMENT)
This is a 69 year old female, , no children, retired 3 years ago (was ) and domiciled with  in a private residence. PPH: Hx of Depression and LEO, engaged in outpatient tx with Dr Schmid x20+ years. No previous psychiatric admissions, denies previous suicide attempts, denies NSSIB. Pt denies legal issues and denies hx of violence/aggression. Denies access to firearms, referred by [facility] for poor functioning due to increased anxiety and depression.     At [facility], pt was seeking admission for worsening anxiety and depression over the last couple of months. She reported that current meds are not effective. Pt reported feeling increasingly depressed and anxious, perseverative, unable to relax, restless with difficulty showering; missing showers multiple x/week, anhedonia, poor motivation, difficulty with ADLs, weight loss,  fatigue/little energy which led her to stay home most of the time, decreased appetite but decent sleep but denied past and present suicidal and homicidal ideation, intent and plan. At home, she has been taking prn xanax 2-3 times daily and prn klonopin 2-3 times daily. States that she has been trying to address these symptoms with her outpatient psychiatrist but that no medication adjustments were helping so he recommended that she go to the ED.    Currently in outpatient treatment with psychiatrist Chester Schmid ([phone]) and therapist Cole Romero ([phone]) who she sees virtually. Denies inpatient psychiatric hospitalizations. Denies history of suicide attempts or self-injurious behavior. No history of AVH. No legal history. No known history of violence. Denies history of trauma. No access to weapons/firearms. Denies any prior history of ECT, TMS or ketamine treatment.

## 2024-04-23 NOTE — BH INPATIENT PSYCHIATRY PROGRESS NOTE - ATTENDING COMMENTS
Care was discussed and reviewed in the interdisciplinary treatment team.  I, Nuria Lanza MD, have reviewed and verified the documentation.  I independently performed the documented medical decision making.
Care was discussed and reviewed in the interdisciplinary treatment team.  I, Jovanny Flores MD, have reviewed and verified the documentation.  I independently performed the documented medical decision making.
Care was discussed and reviewed in the interdisciplinary treatment team.  I, Nuria Lanza MD, have reviewed and verified the documentation.  I independently performed the documented medical decision making. 
Care was discussed and reviewed in the interdisciplinary treatment team.  I, Nuria Lanza MD, have reviewed and verified the documentation.  I independently performed the documented medical decision making.     Patient seemed to be improving with the ECT treatments. She is bright, engaged and pleasant. Denies SI and has been herminia for safety.

## 2024-04-23 NOTE — BH INPATIENT PSYCHIATRY DISCHARGE NOTE - HPI (INCLUDE ILLNESS QUALITY, SEVERITY, DURATION, TIMING, CONTEXT, MODIFYING FACTORS, ASSOCIATED SIGNS AND SYMPTOMS)
Upon admission to [facility], patient appears dysphoric, anxious. She immediately states that she is confused. Reports that she has been suffering with anxiety and depression for years but that her symptoms have slowly worsened since her dog became sick and passed away ~2 years ago (patient is unable to recall when her dog passed away). Symptoms of depression include dysphoric mood, diminished interest in activities stating "I don't want to do anything," and fatigue/little energy that prevents her from completing ADLs stating that she is only able to shower once per week. She reports recent history of 20 lb weight loss "months ago" and states that she gained back 10 lbs. Additionally reports severe anxiety that prevents her from leaving the house, states that she last left her home ~1 month ago. States that she has been trying to address these symptoms with her outpatient psychiatrist but that no medication adjustments were helping so he recommended that she go to the ED. Patient continuously reports feeling confused, is a poor historian in regard to past medications/treatment and timeline of some events including when she retired and when her dog passed away. Patient is A&O x4. She currently denies AVH. Denies SI/I/P, no history of suicidality. Displays insight into need for pharmacologic treatment for depression/anxiety.    Collateral Dr Schmid: MD reported multiple medication failed medication trials, patient insisting on increasing dose then complaining of side effects. MD described significant OCD symptoms, irritability and agitation, inappropriate behavior at appointments. MD said patient was doing well when she was working.     Per [facility] Assessment note: "69 year old female, , no children, retired 3 years ago (was ) and domiciled with  in a private residence. PPH: Hx of Depression and LEO, engaged in outpatient tx with Dr Schmid x20+ years. No previous psychiatric admissions, denies previous suicide attempts, denies NSSIB. PMH: HLD, HTN, IBS, BONIFACIO has CPAP, Pt denies substance/ETOH use. Pt denies legal issues and denies hx of violence/aggression. Denies access to firearms, referred by [facility] for poor functioning due to increased anxiety and depression.     At [facility], pt was seeking admission for worsening anxiety and depression over the last couple of months. She reported that current meds are not effective. Pt reported feeling restless.   Pt reported feeling increasingly anxious, perseverative, unable to relax, restless with difficulty showering; missing showers multiple x/week. Pt reported agoraphobia, anhedonia and disrupted sleep and appetite. Pt denied past and present suicidal and homicidal ideation, intent and plan.    On interview in ED, pt confirms the collateral above. She is unable to identify acute stressors. States she is anxious to the point where she is afraid to go outside. She is showering infrequently. She reports anhedonia. She has been taking prn xanax 2-3 times daily and prn Klonopin 2-3 times daily. She reports full medication compliance. She denies SI/HI. She denies manic or psychotic symptoms. She feels she can't function at home."

## 2024-04-23 NOTE — BH TREATMENT PLAN - NSDCCRITERIA_PSY_ALL_CORE
Remission of symptoms: mood stability, improved anxiety, ability to complete ADLs.

## 2024-04-23 NOTE — BH INPATIENT PSYCHIATRY PROGRESS NOTE - NSBHASSESSSUMMFT_PSY_ALL_CORE
69-year-old female, , no children, retired 3 years ago (was ), domiciled with  in a private residence. PPHx of Depression and LEO, engaged in outpatient tx with Dr Schmid x20+ years and therapist Cole Romero. No previous psychiatric admissions, denies previous suicide attempts, denies NSSIB. Pt denies all substance use. Pt denies legal issues and denies hx of violence/aggression. Denies access to firearms. Pertinent PMH HLD, HTN, IBS, BONIFACIO has CPAP (has not used for months). Referred by [facility] for poor functioning and inability to care for oneself due to worsening anxiety and depression. Patient reports symptoms of depression including dysphoric mood, diminished interest in activities, and fatigue/little energy that prevents her from completing ADLs. She reports recent history of 20 lb weight loss "months ago" and states that she gained back 10 lbs. Additionally reports severe anxiety that prevents her from leaving the house, states that she last left her home ~1 month ago. Denies AVH. Denies SI/I/P. Displays insight into need for pharmacologic treatment for depression/anxiety.    Hospital course: Patient admitted to [facility]. Started on home psychiatric regimen of klonopin 1mg TID, rexulti 1.5mg qD, buspirone 20mg qD, hydroxyzine 10mg TID, trazodone 100 mg QHS. Started on Prozac 10mg qD, gradually increased to 40mg. Klonopin and hydroxyzine d/jacinda, buspirone decreased. ECT started on 4/16 with improvement in symptoms. Rexulti d/jacinda 4/11 due to side effects (shakiness). MOCA on 3/26 (prior to ECT #1) - 15, MOCA on 4/10 (prior to ECT #4) - 21, MOCA on 4/15 (prior to ECT #6) - 25.     On assessment patient was less depressed, some anxiety about upcoming discharge. Ambulation improved. Denies medication side effects. Denies SI/I/P.     Plan:  1. Admit to [facility], 9.13 legal status.    2. Routine observation with q 15 minute checks. Patient denies suicidality, remains in behavioral control.    3. Psych: continue home medication regimen pending collateral.  ECT #8 today  MOCA 4/15 - 25  MOCA 4/10 - 21  MOCA 3/26 - 15  Prozac 40 mg daily  Buspirone 10 mg BID  Trazodone 100 mg QHS  D/C Rexulti  --PRN Ativan 2 mg IM for severe agitation    4. Medical: continue home medication regimen. Labs reviewed.  Amlodipine 2.5 mg daily   Aspirin 81 mg daily  Losartan 50 mg daily  Hydrochlorothiazide 25 mg po daily  Pravastatin 20 mg po daily  B complex/vitamin C/multivitamin    5. Encourage individual, group, and milieu therapy.    6. Collaboration with outpatient psychiatrist Dr. Chester Schmid, [phone].    7. Dispo collaboration with social work.

## 2024-04-23 NOTE — BH INPATIENT PSYCHIATRY PROGRESS NOTE - OTHER
psychomotor retardation  "Alright...the same" Smiling at times walking improving, using walker  Lying in bed, sits up at times

## 2024-04-23 NOTE — BH TREATMENT PLAN - ANXIETY/PANIC/FEAR NURSING INTERVENTIONS/RECOMMENDATIONS
Provide education on coping skills that aid in mitigating feelings of anxiety; encourage use of coping skills after education is provided by staff when pt is feeling anxious; redirect as needed/indicated
Provide reassurance and comfort measures  Encourage patient participation in relaxation exercises  Encourage patient to utilize coping skills
Provide education on coping skills that aid in mitigating feelings of anxiety; encourage use of coping skills after education is provided by staff when pt is feeling anxious; redirect as needed/indicated

## 2024-04-23 NOTE — BH INPATIENT PSYCHIATRY DISCHARGE NOTE - NSBHASSESSSUMMFT_PSY_ALL_CORE
69-year-old female, , no children, retired 3 years ago (was ), domiciled with  in a private residence. PPHx of Depression and LEO, engaged in outpatient tx with Dr Schmid x20+ years and therapist Cole Romero. No previous psychiatric admissions, denies previous suicide attempts, denies NSSIB. Pt denies all substance use. Pt denies legal issues and denies hx of violence/aggression. Denies access to firearms. Pertinent PMH HLD, HTN, IBS, BONIFACIO has CPAP (has not used for months). Referred by [facility] for poor functioning and inability to care for oneself due to worsening anxiety and depression. Patient reports symptoms of depression including dysphoric mood, diminished interest in activities, and fatigue/little energy that prevents her from completing ADLs. She reports recent history of 20 lb weight loss "months ago" and states that she gained back 10 lbs. Additionally reports severe anxiety that prevents her from leaving the house, states that she last left her home ~1 month ago. Denies AVH. Denies SI/I/P. Displays insight into need for pharmacologic treatment for depression/anxiety.    Hospital course: Patient admitted to [facility]. Started on home psychiatric regimen of klonopin 1mg TID, rexulti 1.5mg qD, buspirone 20mg qD, hydroxyzine 10mg TID, trazodone 100 mg QHS. Started on Prozac 10mg qD, gradually increased to 40mg. Klonopin and hydroxyzine d/jacinda, buspirone decreased. ECT started on 4/16 with improvement in symptoms. Rexulti d/jacinda 4/11 due to side effects (shakiness). MOCA on 3/26 (prior to ECT #1) - 15, MOCA on 4/10 (prior to ECT #4) - 21, MOCA on 4/15 (prior to ECT #6) - 25.     On assessment patient was less depressed, some anxiety about upcoming discharge. Ambulation improved. Denies medication side effects. Denies SI/I/P.     Plan:  1. Admit to [facility], 9.13 legal status.    2. Routine observation with q 15 minute checks. Patient denies suicidality, remains in behavioral control.    3. Psych: continue home medication regimen pending collateral.  ECT #8 today  MOCA 4/15 - 25  MOCA 4/10 - 21  MOCA 3/26 - 15  Prozac 40 mg daily  Buspirone 10 mg BID  Trazodone 100 mg QHS  D/C Rexulti  --PRN Ativan 2 mg IM for severe agitation    4. Medical: continue home medication regimen. Labs reviewed.  Amlodipine 2.5 mg daily   Aspirin 81 mg daily  Losartan 50 mg daily  Hydrochlorothiazide 25 mg po daily  Pravastatin 20 mg po daily  B complex/vitamin C/multivitamin    5. Encourage individual, group, and milieu therapy.    6. Collaboration with outpatient psychiatrist Dr. Chester Schmid, [phone].    7. Dispo collaboration with social work.

## 2024-04-23 NOTE — BH INPATIENT PSYCHIATRY PROGRESS NOTE - NSBHCHARTREVIEWVS_PSY_A_CORE FT
Vital Signs Last 24 Hrs  T(C): 36.8 (04-23-24 @ 07:35), Max: 36.8 (04-23-24 @ 07:35)  T(F): 98.3 (04-23-24 @ 07:35), Max: 98.3 (04-23-24 @ 07:35)  HR: --  BP: --  BP(mean): --  RR: 19 (04-23-24 @ 07:35) (17 - 19)  SpO2: 99% (04-23-24 @ 04:00) (99% - 99%)    Orthostatic VS  04-23-24 @ 07:35  Lying BP: --/-- HR: --  Sitting BP: 121/84 HR: 91  Standing BP: 120/74 HR: 99  Site: --  Mode: --  Orthostatic VS  04-22-24 @ 20:30  Lying BP: --/-- HR: --  Sitting BP: 133/84 HR: 83  Standing BP: 128/81 HR: 91  Site: --  Mode: --  Orthostatic VS  04-22-24 @ 07:46  Lying BP: --/-- HR: --  Sitting BP: 105/79 HR: 87  Standing BP: 105/64 HR: 99  Site: --  Mode: --  Orthostatic VS  04-21-24 @ 20:39  Lying BP: --/-- HR: --  Sitting BP: 104/80 HR: 92  Standing BP: 100/74 HR: 104  Site: --  Mode: --   Vital Signs Last 24 Hrs  T(C): 36.2 (04-24-24 @ 07:44), Max: 36.6 (04-23-24 @ 20:43)  T(F): 97.2 (04-24-24 @ 07:44), Max: 97.8 (04-23-24 @ 20:43)  HR: --  BP: --  BP(mean): --  RR: 18 (04-24-24 @ 07:44) (18 - 19)  SpO2: --    Orthostatic VS  04-24-24 @ 07:44  Lying BP: --/-- HR: --  Sitting BP: 111/74 HR: 112  Standing BP: 108/68 HR: 113  Site: --  Mode: --  Orthostatic VS  04-23-24 @ 20:43  Lying BP: --/-- HR: --  Sitting BP: 127/84 HR: 83  Standing BP: 132/81 HR: 90  Site: --  Mode: --  Orthostatic VS  04-23-24 @ 07:35  Lying BP: --/-- HR: --  Sitting BP: 121/84 HR: 91  Standing BP: 120/74 HR: 99  Site: --  Mode: --  Orthostatic VS  04-22-24 @ 20:30  Lying BP: --/-- HR: --  Sitting BP: 133/84 HR: 83  Standing BP: 128/81 HR: 91  Site: --  Mode: --

## 2024-04-23 NOTE — BH INPATIENT PSYCHIATRY PROGRESS NOTE - NSBHATTESTATTENDNAMEFT_PSY_A_CORE
Dr Alva
Dr Flores
Dr Alva

## 2024-04-23 NOTE — BH TREATMENT PLAN - NSTXDCOTHRINTERSW_PSY_ALL_CORE
SW will provide patient and her spouse/family with support, psycho-education, and discharge planning; while coordinating care with interdisciplinary treatment team.

## 2024-04-23 NOTE — BH TREATMENT PLAN - NSTXOBSCOMINTERRN_PSY_ALL_CORE
Provide education on coping skills that aid in mitigating compulsions/obsessive thoughts/feelings; redirect as needed/indicated

## 2024-04-23 NOTE — BH INPATIENT PSYCHIATRY DISCHARGE NOTE - OTHER
Lying in bed, sits up at times psychomotor retardation  Smiling at times "Alright...the same" walking improving, using walker

## 2024-04-23 NOTE — BH TREATMENT PLAN - NSTXCOPEINTERRN_PSY_ALL_CORE
Provide education on methods of coping effectively; encourage use of coping skills after education is provided by staff; redirect as needed/indicated

## 2024-04-23 NOTE — BH INPATIENT PSYCHIATRY DISCHARGE NOTE - NSDCCPCAREPLAN_GEN_ALL_CORE_FT
PRINCIPAL DISCHARGE DIAGNOSIS  Diagnosis: Severe episode of recurrent major depressive disorder, without psychotic features  Assessment and Plan of Treatment:       SECONDARY DISCHARGE DIAGNOSES  Diagnosis: Anxiety disorder  Assessment and Plan of Treatment:     Diagnosis: Insomnia  Assessment and Plan of Treatment:     Diagnosis: HTN (hypertension)  Assessment and Plan of Treatment:     Diagnosis: HLD (hyperlipidemia)  Assessment and Plan of Treatment:

## 2024-04-23 NOTE — BH TREATMENT PLAN - NSTXPATIENTPARTICIPATE_PSY_ALL_CORE
Patient participated in identification of needs/problems/goals for treatment/Patient participated in defining interventions

## 2024-04-23 NOTE — BH TREATMENT PLAN - NSTXPLANTHERAPYSESSIONSFT_PSY_ALL_CORE
04-04-24  Type of therapy: Leisure development  Type of session: Individual  Level of patient participation: Not engaged, Participated with encouragement  Duration of participation: Less than 15 minutes  Therapy conducted by: Psych rehab  Therapy Summary: Writer attempted twice to meet with the Pt to assess her progress towards Psych Rehab goal. On the first attempt Pt asked the writer to come later. Upon approach on the second attempt Pt was alert, in her room and reports that she is not in mood to talk. Pt described her mood as “tired”. Pt added that she feels the same and no improvement. Pt denies SI/HI/AH/VH. Over the past week Pt made no progress towards Psych Rehab goal. Pt was unable to identify any coping skills. Writer attempted to explore coping skills but Pt was not receptive. Pt is compliant with meds and had her first ECT.  Pt attended only one group and mostly isolative in her room. Pt does not engage with peers and remains guarded. Over the next week, Psychiatric Rehabilitation staff will continue to provide encouragement, support, psychotherapy, and psychoeducation to assist the Pt in the progression of her treatment goal and engagement with activities.    04-04-24  --  Type of session: Individual  Level of patient participation: Participates  --  Therapy conducted by: Other (specify), Physical therapy  Therapy Summary: Patient engaged in gait training using RW and B LE strengthening exercises  
  04-17-24  Type of therapy: none  Type of session: Individual  Level of patient participation: Participated with encouragement  Duration of participation: 30 minutes  Therapy conducted by: Psych rehab  Therapy Summary: Writer met with Pt to assess her progress towards psychiatric rehabilitation goal over the past week. Pt was minimally  verbal, calm, and receptive during the session. Upon approach Pt was in her room and resting in her bed. Pt was seen by the bedside. Pt reports improvement in mood. Pt reports that her brain fog is also improved. However, she reports feeling tired this morning due to poor sleep overnight but is otherwise well. Pt reports adequate appetite and fair sleep. Over the past week, Pt made some progress towards Psych Rehab goal. Pt was able to identify taking a nap, reading magazine, and taking her meds in timely as her positive coping skills. Pt engaged in safety planning with some assistance. Pt denies SI/HI/AH/VH. Pt is compliant with meds and ECT. Pt did not attend any groups despite a lot of encouragement from staff. Pt remains isolative and mostly in bed. Pt does not engage with peers. Pt reports that she has lack of interest in learning, exploring coping skills. Pt continues to be not receptive to developmental skills. Over the next seven days, Psychiatric Rehabilitation staff will continue to provide encouragement, support, psychotherapy, and psychoeducation to assist the Pt in the progression of her treatment goal and engagement with activities.  
  04-10-24  Type of therapy: Other  Type of session: Individual  Level of patient participation: Participated with encouragement  Duration of participation: Less than 15 minutes  Therapy conducted by: Psych rehab  Therapy Summary: The writer met with the patient to assess progress of their psychiatric rehabilitation goal over the past week. The patient made minimal improvement towards her goal to identify and utilize 2 coping skills to meet her needs for her coping ineffective goal. The patient reported sleeping as her coping skill. The patient is compliant with her medications, does not engage with her peers, and has fair ADL’s and behavioral control. The patient attended none of the Psychiatric Rehabilitation groups in the past seven days. Over the next seven days, Psychiatric Rehabilitation staff will continue to provide encouragement, support, psychotherapy, and psychoeducation to assist the patient in the progression of her treatment goal and engagement with activities. The patient denied SI/HI/AH/VH.

## 2024-04-23 NOTE — BH INPATIENT PSYCHIATRY DISCHARGE NOTE - NSDCMRMEDTOKEN_GEN_ALL_CORE_FT
amLODIPine 2.5 mg oral tablet: 1 tab(s) orally once a day  aspirin 81 mg oral tablet, chewable: 1 tab(s) orally once a day  atorvastatin 20 mg oral tablet: 1 tab(s) orally once a day (at bedtime)  busPIRone 10 mg oral tablet: 1 tab(s) orally 2 times a day  FLUoxetine 40 mg oral capsule: 1 cap(s) orally once a day  losartan 50 mg oral tablet: 1 tab(s) orally once a day  traZODone 100 mg oral tablet: 1 tab(s) orally once a day (at bedtime)

## 2024-04-23 NOTE — BH TREATMENT PLAN - NSTXCAREGIVERPARTICIPATE_PSY_P_CORE
Family/Caregiver participated in identification of needs/problems/goals for treatment/Family/Caregiver participated in defining interventions

## 2024-04-23 NOTE — BH TREATMENT PLAN - NSTXCOPEINTERMD_PSY_ALL_CORE
Group and milieu therapy 

## 2024-04-23 NOTE — BH TREATMENT PLAN - NSTXPROBOBSCOM_PSY_ALL_CORE
OBSESSIONS/COMPULSIONS

## 2024-04-23 NOTE — BH INPATIENT PSYCHIATRY DISCHARGE NOTE - HOSPITAL COURSE
Dates of hospitalization:  02/08-02/11/21     On admission interview, patient presented with the following signs and symptoms:  tearful/anxious affect with concern over panic attacks (having occurred 3-4 times prior to admission). Reports of disorganized behavior in context of active panic attacks beginning with a dream that God told her it's okay to divorce her  leading to her waking up in a panic. Patient circumstantial intermittently in describing events. Acute stressors of intense arguments with  (over chronic marital concerns and desire for therapy) the days leading to first panic attack. Patient experienced panic symptoms during interview/recollection of events.     Patient was started on Effexor XR which was titrated to a dose of 75 with good tolerability. Patient’s symptoms stabilized during hospitalization with PRN 1 mg Ativan used on 1 occasion to abort a panic attack. At time of discharge, patient ready to go home and seek further care as an outpatient (never endorsed current or past SI).      There were no behavioral problems on the unit.  Patient did not become agitated and did not require emergent intramuscular medications or seclusion/restraints.  Patient did not self-harm on the unit. Patient remained actively engaged in treatment. Patient participated in individual, group, and milieu therapy. Patient got along appropriately with staff and peers. Family was not contacted at time of admission to obtain collateral (no consents). Safety planning and disposition planning were performed.  Patient did not have any medical problems during this hospitalization.  There were no medical consultations.       On day of discharge, the patient has improved significantly and no longer requires inpatient treatment and care. Patient denies all suicidal and aggressive ideation, intent and plan. Patient displays no psychotic symptoms. Patient is not judged to be an acute danger to self or others at this time. She is stable for transition to outpatient level of care.         Risk Assessment: The patient is at chronic risk of harm to self and others given diagnosis of anxiety disorder NOS vs adjustment disorder with mixed features r/o panic disorder in addition to current psychiatric hospitalization. Patient suffers from chronic pain condition 2/2 TBI in addition. Remote history of depression 20 years ago also confers risk.      Protective factors include no access to firearms, dependents (twin boys), patient engagement with treatment and desire to obtain treatment (even prior to hospitalization). Patient with spiritual/[employer] values with stable housing.      On admission the patient was felt to be at an acutely elevated risk of harm to self or others as they were suffering from panic attacks (with associated disorganized behavior) without abortive medication or pharmacotherapy established with the goal of treating/preventing them. Over the hospital course medications were started and patient was set up with after-care plans.     Although patient has an elevated chronic risk of harm to self or others, acute risk has been addressed during inpatient hospitalization. Further hospitalization is no longer warranted. Patient is ready for transition to outpatient care for further management.        Patient will be discharged with the following DSM5 diagnoses:   1.	Anxiety Disorder NOS  2.	R/O Panic Disorder      Patient will be discharged on the following medications:   1.	Effexor XR 75 mg - 14 days qdaily  2.	Ativan 1 mg - 16 tablets (prn q6H)           1.	Will d/c home on current regimen. 2. Psychoeducation provided regarding importance of compliance with outpatient appointments and medications. 3. Pt was advised to reduce substance use (MJ/alcohol). 4. Pt was advised to dial 911, return to the ER, or visit the [facility] if they become a danger to self or others or need urgent help.       Dates of hospitalization:  03/22/24-04/26/24     On admission interview, patient presented with the following signs and symptoms:  patient appears dysphoric, anxious. She immediately states that she is confused. Reports that she has been suffering with anxiety and depression for years but that her symptoms have slowly worsened since her dog became sick and passed away ~2 years ago (patient is unable to recall when her dog passed away). Symptoms of depression include dysphoric mood, diminished interest in activities stating "I don't want to do anything," and fatigue/little energy that prevents her from completing ADLs stating that she is only able to shower once per week. She reports recent history of 20 lb weight loss "months ago" and states that she gained back 10 lbs. Additionally reports severe anxiety that prevents her from leaving the house, states that she last left her home ~1 month ago. States that she has been trying to address these symptoms with her outpatient psychiatrist but that no medication adjustments were helping so he recommended that she go to the ED. Patient continuously reports feeling confused, is a poor historian in regard to past medications/treatment and timeline of some events including when she retired and when her dog passed away. Patient is A&O x4. She currently denies AVH. Denies SI/I/P, no history of suicidality. Displays insight into need for pharmacologic treatment for depression/anxiety.     Patient was started on Prozac which was titrated to a dose of 40mg with good tolerability. Patient’s symptoms stabilized during hospitalization. At time of discharge, patient ready to go home and seek further care as an outpatient (never endorsed current or past SI).      There were no behavioral problems on the unit.  Patient did not become agitated and did not require emergent intramuscular medications or seclusion/restraints.  Patient did not self-harm on the unit. Patient remained actively engaged in treatment. Patient participated in individual, group, and milieu therapy. Patient got along appropriately with staff and peers. , Kevin, was contacted at time of admission to obtain collateral. Safety planning and disposition planning were performed.  Patient had unsteady gait upon admission, was seen by PT and required use of rolling walker during hospitalization.      On day of discharge, the patient has improved significantly and no longer requires inpatient treatment and care. Patient denies all suicidal and aggressive ideation, intent and plan. Patient displays no psychotic symptoms. Patient is not judged to be an acute danger to self or others at this time. She is stable for transition to outpatient level of care.         Risk Assessment: The patient is at chronic risk of harm to self and others given diagnosis of MDD, anxiety disorder, insomnia in addition to current psychiatric hospitalization.      Protective factors include no access to firearms, , stable housing, patient engagement with treatment and desire to obtain treatment (even prior to hospitalization).      On admission the patient was felt to be at an acutely elevated risk of harm to self or others as they were suffering from worsening depression, increased anxiety, poor functioning, erratic behavior without abortive medication or pharmacotherapy established with the goal of treating/preventing them. Over the hospital course medications were started and patient was set up with after-care plans.     Although patient has an elevated chronic risk of harm to self or others, acute risk has been addressed during inpatient hospitalization. Further hospitalization is no longer warranted. Patient is ready for transition to outpatient care for further management.        Patient will be discharged with the following DSM5 diagnoses:   1.	MDD  2.	Anxiety Disorder  3.	Insomnia      Patient will be discharged on the following medications:   1.	Prozac 40mg daily - 30 days   2.	Buspar 10mg BID - 30 days   3. 	Trazodone 100mg at bedtime - 30 days            1.	Will d/c home on current regimen. 2. Psychoeducation provided regarding importance of compliance with outpatient appointments and medications. 3. Pt was advised to reduce substance use (MJ/alcohol). 4. Pt was advised to dial 911, return to the ER, or visit the [facility] if they become a danger to self or others or need urgent help.

## 2024-04-23 NOTE — BH TREATMENT PLAN - NSTXMEDICGOAL_PSY_ALL_CORE
Be able to describe the benefit of medication/treatment

## 2024-04-23 NOTE — BH TREATMENT PLAN - NSTXMEDICINTERRN_PSY_ALL_CORE
Provide education on the importance of adhering to medication regimen; provide education on the potential consequences of not taking medications as prescribed; redirect as needed/indicated

## 2024-04-23 NOTE — BH INPATIENT PSYCHIATRY PROGRESS NOTE - NSBHMETABOLIC_PSY_ALL_CORE_FT
BMI: BMI (kg/m2): 33.6 (03-22-24 @ 12:45)  HbA1c: A1C with Estimated Average Glucose Result: 5.1 % (04-17-24 @ 08:00)    Glucose:   BP: 156/72 (04-22-24 @ 16:59) (124/72 - 156/72)Vital Signs Last 24 Hrs  T(C): 36.8 (04-23-24 @ 07:35), Max: 36.8 (04-23-24 @ 07:35)  T(F): 98.3 (04-23-24 @ 07:35), Max: 98.3 (04-23-24 @ 07:35)  HR: --  BP: --  BP(mean): --  RR: 19 (04-23-24 @ 07:35) (17 - 19)  SpO2: 99% (04-23-24 @ 04:00) (99% - 99%)    Orthostatic VS  04-23-24 @ 07:35  Lying BP: --/-- HR: --  Sitting BP: 121/84 HR: 91  Standing BP: 120/74 HR: 99  Site: --  Mode: --  Orthostatic VS  04-22-24 @ 20:30  Lying BP: --/-- HR: --  Sitting BP: 133/84 HR: 83  Standing BP: 128/81 HR: 91  Site: --  Mode: --  Orthostatic VS  04-22-24 @ 07:46  Lying BP: --/-- HR: --  Sitting BP: 105/79 HR: 87  Standing BP: 105/64 HR: 99  Site: --  Mode: --  Orthostatic VS  04-21-24 @ 20:39  Lying BP: --/-- HR: --  Sitting BP: 104/80 HR: 92  Standing BP: 100/74 HR: 104  Site: --  Mode: --    Lipid Panel: Date/Time: 04-17-24 @ 08:00  Cholesterol, Serum: 129  LDL Cholesterol Calculated: 59  HDL Cholesterol, Serum: 56  Total Cholesterol/HDL Ration Measurement: --  Triglycerides, Serum: 70   BMI: BMI (kg/m2): 33.6 (03-22-24 @ 12:45)  HbA1c: A1C with Estimated Average Glucose Result: 5.1 % (04-17-24 @ 08:00)    Glucose:   BP: 156/72 (04-22-24 @ 16:59) (124/72 - 156/72)Vital Signs Last 24 Hrs  T(C): 36.2 (04-24-24 @ 07:44), Max: 36.6 (04-23-24 @ 20:43)  T(F): 97.2 (04-24-24 @ 07:44), Max: 97.8 (04-23-24 @ 20:43)  HR: --  BP: --  BP(mean): --  RR: 18 (04-24-24 @ 07:44) (18 - 19)  SpO2: --    Orthostatic VS  04-24-24 @ 07:44  Lying BP: --/-- HR: --  Sitting BP: 111/74 HR: 112  Standing BP: 108/68 HR: 113  Site: --  Mode: --  Orthostatic VS  04-23-24 @ 20:43  Lying BP: --/-- HR: --  Sitting BP: 127/84 HR: 83  Standing BP: 132/81 HR: 90  Site: --  Mode: --  Orthostatic VS  04-23-24 @ 07:35  Lying BP: --/-- HR: --  Sitting BP: 121/84 HR: 91  Standing BP: 120/74 HR: 99  Site: --  Mode: --  Orthostatic VS  04-22-24 @ 20:30  Lying BP: --/-- HR: --  Sitting BP: 133/84 HR: 83  Standing BP: 128/81 HR: 91  Site: --  Mode: --    Lipid Panel: Date/Time: 04-17-24 @ 08:00  Cholesterol, Serum: 129  LDL Cholesterol Calculated: 59  HDL Cholesterol, Serum: 56  Total Cholesterol/HDL Ration Measurement: --  Triglycerides, Serum: 70

## 2024-04-23 NOTE — BH TREATMENT PLAN - NSTXOBSCOMINTERMD_PSY_ALL_CORE
Medication management, Group and milieu therapy

## 2024-04-23 NOTE — BH TREATMENT PLAN - NSTXDCOTHRGOAL_PSY_ALL_CORE
Patient will report improved mood and insight into coping skills for symptoms, and with no SIIP.

## 2024-04-23 NOTE — BH TREATMENT PLAN - NSTXOBSCOMGOAL_PSY_ALL_CORE
Will identify anxious feelings associated with obsess thoughts

## 2024-04-24 PROCEDURE — 99232 SBSQ HOSP IP/OBS MODERATE 35: CPT

## 2024-04-24 RX ADMIN — Medication 81 MILLIGRAM(S): at 08:29

## 2024-04-24 RX ADMIN — Medication 100 MILLIGRAM(S): at 20:54

## 2024-04-24 RX ADMIN — LOSARTAN POTASSIUM 50 MILLIGRAM(S): 100 TABLET, FILM COATED ORAL at 08:29

## 2024-04-24 RX ADMIN — Medication 10 MILLIGRAM(S): at 20:54

## 2024-04-24 RX ADMIN — Medication 40 MILLIGRAM(S): at 08:29

## 2024-04-24 RX ADMIN — AMLODIPINE BESYLATE 2.5 MILLIGRAM(S): 2.5 TABLET ORAL at 08:29

## 2024-04-24 RX ADMIN — ATORVASTATIN CALCIUM 20 MILLIGRAM(S): 80 TABLET, FILM COATED ORAL at 20:53

## 2024-04-24 RX ADMIN — Medication 10 MILLIGRAM(S): at 08:29

## 2024-04-24 RX ADMIN — SENNA PLUS 2 TABLET(S): 8.6 TABLET ORAL at 20:53

## 2024-04-24 NOTE — BH INPATIENT PSYCHIATRY PROGRESS NOTE - NSBHASSESSSUMMFT_PSY_ALL_CORE
69-year-old female, , no children, retired 3 years ago (was ), domiciled with  in a private residence. PPHx of Depression and LEO, engaged in outpatient tx with Dr Schmid x20+ years and therapist Cole Romero. No previous psychiatric admissions, denies previous suicide attempts, denies NSSIB. Pt denies all substance use. Pt denies legal issues and denies hx of violence/aggression. Denies access to firearms. Pertinent PMH HLD, HTN, IBS, BONIFACIO has CPAP (has not used for months). Referred by [facility] for poor functioning and inability to care for oneself due to worsening anxiety and depression. Patient reports symptoms of depression including dysphoric mood, diminished interest in activities, and fatigue/little energy that prevents her from completing ADLs. She reports recent history of 20 lb weight loss "months ago" and states that she gained back 10 lbs. Additionally reports severe anxiety that prevents her from leaving the house, states that she last left her home ~1 month ago. Denies AVH. Denies SI/I/P. Displays insight into need for pharmacologic treatment for depression/anxiety.    Hospital course: Patient admitted to [facility]. Started on home psychiatric regimen of klonopin 1mg TID, rexulti 1.5mg qD, buspirone 20mg qD, hydroxyzine 10mg TID, trazodone 100 mg QHS. Started on Prozac 10mg qD, gradually increased to 40mg. Klonopin and hydroxyzine d/jacinda, buspirone decreased. ECT started on 4/16 with improvement in symptoms. Rexulti d/jacinda 4/11 due to side effects (shakiness). MOCA on 3/26 (prior to ECT #1) - 15, MOCA on 4/10 (prior to ECT #4) - 21, MOCA on 4/15 (prior to ECT #6) - 25.     On assessment patient was less depressed, some anxiety about upcoming discharge. Ambulation improved. Denies medication side effects. Denies SI/I/P.     Plan:  1. Admit to [facility], 9.13 legal status.    2. Routine observation with q 15 minute checks. Patient denies suicidality, remains in behavioral control.    3. Psych: continue home medication regimen pending collateral.  ECT #8 today  MOCA 4/15 - 25  MOCA 4/10 - 21  MOCA 3/26 - 15  Prozac 40 mg daily  Buspirone 10 mg BID  Trazodone 100 mg QHS  D/C Rexulti  --PRN Ativan 2 mg IM for severe agitation    4. Medical: continue home medication regimen. Labs reviewed.  Amlodipine 2.5 mg daily   Aspirin 81 mg daily  Losartan 50 mg daily  Hydrochlorothiazide 25 mg po daily  Pravastatin 20 mg po daily  B complex/vitamin C/multivitamin    5. Encourage individual, group, and milieu therapy.    6. Collaboration with outpatient psychiatrist Dr. Chester Schmid, [phone].    7. Dispo collaboration with social work.  69-year-old female, , no children, retired 3 years ago (was ), domiciled with  in a private residence. PPHx of Depression and LEO, engaged in outpatient tx with Dr Schmid x20+ years and therapist Cole Romero. No previous psychiatric admissions, denies previous suicide attempts, denies NSSIB. Pt denies all substance use. Pt denies legal issues and denies hx of violence/aggression. Denies access to firearms. Pertinent PMH HLD, HTN, IBS, BONIFACIO has CPAP (has not used for months). Referred by [facility] for poor functioning and inability to care for oneself due to worsening anxiety and depression. Patient reports symptoms of depression including dysphoric mood, diminished interest in activities, and fatigue/little energy that prevents her from completing ADLs. She reports recent history of 20 lb weight loss "months ago" and states that she gained back 10 lbs. Additionally reports severe anxiety that prevents her from leaving the house, states that she last left her home ~1 month ago. Denies AVH. Denies SI/I/P. Displays insight into need for pharmacologic treatment for depression/anxiety.    Hospital course: Patient admitted to . Started on home psychiatric regimen of klonopin 1mg TID, rexulti 1.5mg qD, buspirone 20mg qD, hydroxyzine 10mg TID, trazodone 100 mg QHS. Started on Prozac 10mg qD, gradually increased to 40mg. Klonopin and hydroxyzine d/jacinda, buspirone decreased. ECT started on 4/16 with improvement in symptoms. Rexulti d/jacinda 4/11 due to side effects (shakiness). MOCA on 3/26 (prior to ECT #1) - 15, MOCA on 4/10 (prior to ECT #4) - 21, MOCA on 4/15 (prior to ECT #6) - 25.     On assessment patient was less depressed, some anxiety about upcoming discharge. Ambulation improved. Denies medication side effects. Denies SI/I/P.     Plan:  1. Admit to [facility], 9.13 legal status.    2. Routine observation with q 15 minute checks. Patient denies suicidality, remains in behavioral control.    3. Psych: continue home medication regimen pending collateral.  ECT #9 scheduled for Friday 4/26  MOCA 4/15 - 25  MOCA 4/10 - 21  MOCA 3/26 - 15  Prozac 40 mg daily  Buspirone 10 mg BID  Trazodone 100 mg QHS  D/C Rexulti  --PRN Ativan 2 mg IM for severe agitation    4. Medical: continue home medication regimen. Labs reviewed.  Amlodipine 2.5 mg daily   Aspirin 81 mg daily  Losartan 50 mg daily  Hydrochlorothiazide 25 mg po daily  Pravastatin 20 mg po daily  B complex/vitamin C/multivitamin    5. Encourage individual, group, and milieu therapy.    6. Collaboration with outpatient psychiatrist Dr. Chester Schmid, [phone].    7. Dispo collaboration with social work. Anticipate discharge on Friday 4/26 with o/p ECT.

## 2024-04-24 NOTE — BH INPATIENT PSYCHIATRY PROGRESS NOTE - NSBHFUPINTERVALHXFT_PSY_A_CORE
Patient seen and evaluated for management of MDD and anxiety in patient's room. Case discussed with treatment team, chart is reviewed.  No issues overnight Patient NPO for ECT #8 today. VSS. No events overnight. Patient reports mood is "better", denies depression.  She continues to feel better than when she arrived but anxious about going home and if she will be able to perform as required. Patient is looking forward to getting her hair done. Discussed continuing ECT upon discharge, feels it has been helpful. Next ECT will be Friday. Patient's ambulation has improved, PT evaluated today. Denies medication side effects, including nausea, constipation, headache, dizziness. Denies SI/I/P.  No somatic complaints.  No agitation and in good behavioral control.    Spoke with Kevin, , inquired about discharge and looking to obtain information for outpatient provider. Informed about Discharge on Friday with plan for ambulatory ECT. He was in agreement.  Patient seen and evaluated for management of MDD and anxiety in patient's room. Case discussed with treatment team, chart is reviewed.  No issues overnight Patient scheduled for ECT next Friday with anticipated discharge following. VSS. No events overnight. Patient reports mood is "better", denies depression.  She continues to feel better than when she arrived but anxious about going home and if she will be able to perform as required.  Discussed continuing ECT upon discharge, feels it has been helpful. Next ECT will be Friday. Patient denies medication side effects, including nausea, constipation, headache, dizziness. Denies SI/I/P.  No somatic complaints.  No agitation and in good behavioral control.    Spoke with Kevin, , inquired about discharge and looking to obtain information for outpatient provider. Informed about Discharge on Friday with plan for ambulatory ECT. He was in agreement.

## 2024-04-24 NOTE — BH INPATIENT PSYCHIATRY PROGRESS NOTE - NSBHMETABOLIC_PSY_ALL_CORE_FT
BMI: BMI (kg/m2): 33.6 (03-22-24 @ 12:45)  HbA1c: A1C with Estimated Average Glucose Result: 5.1 % (04-17-24 @ 08:00)    Glucose:   BP: 156/72 (04-22-24 @ 16:59) (124/72 - 156/72)Vital Signs Last 24 Hrs  T(C): 36.2 (04-24-24 @ 07:44), Max: 36.6 (04-23-24 @ 20:43)  T(F): 97.2 (04-24-24 @ 07:44), Max: 97.8 (04-23-24 @ 20:43)  HR: --  BP: --  BP(mean): --  RR: 18 (04-24-24 @ 07:44) (18 - 19)  SpO2: --    Orthostatic VS  04-24-24 @ 07:44  Lying BP: --/-- HR: --  Sitting BP: 111/74 HR: 112  Standing BP: 108/68 HR: 113  Site: --  Mode: --  Orthostatic VS  04-23-24 @ 20:43  Lying BP: --/-- HR: --  Sitting BP: 127/84 HR: 83  Standing BP: 132/81 HR: 90  Site: --  Mode: --  Orthostatic VS  04-23-24 @ 07:35  Lying BP: --/-- HR: --  Sitting BP: 121/84 HR: 91  Standing BP: 120/74 HR: 99  Site: --  Mode: --  Orthostatic VS  04-22-24 @ 20:30  Lying BP: --/-- HR: --  Sitting BP: 133/84 HR: 83  Standing BP: 128/81 HR: 91  Site: --  Mode: --    Lipid Panel: Date/Time: 04-17-24 @ 08:00  Cholesterol, Serum: 129  LDL Cholesterol Calculated: 59  HDL Cholesterol, Serum: 56  Total Cholesterol/HDL Ration Measurement: --  Triglycerides, Serum: 70   BMI: BMI (kg/m2): 33.6 (03-22-24 @ 12:45)  HbA1c: A1C with Estimated Average Glucose Result: 5.1 % (04-17-24 @ 08:00)    Glucose:   BP: 171/100 (04-26-24 @ 11:50) (135/81 - 171/100)Vital Signs Last 24 Hrs  T(C): 36.4 (04-26-24 @ 11:47), Max: 36.4 (04-25-24 @ 20:20)  T(F): 97.6 (04-26-24 @ 11:47), Max: 97.6 (04-25-24 @ 20:20)  HR: 100 (04-26-24 @ 11:50) (73 - 100)  BP: 171/100 (04-26-24 @ 11:50) (138/76 - 171/100)  BP(mean): --  RR: 22 (04-26-24 @ 11:50) (18 - 22)  SpO2: 99% (04-26-24 @ 11:50) (98% - 99%)    Orthostatic VS  04-26-24 @ 07:53  Lying BP: --/-- HR: --  Sitting BP: 134/89 HR: 79  Standing BP: 119/81 HR: 87  Site: --  Mode: --  Orthostatic VS  04-25-24 @ 20:20  Lying BP: --/-- HR: --  Sitting BP: 140/79 HR: 89  Standing BP: 139/75 HR: 91  Site: --  Mode: --  Orthostatic VS  04-25-24 @ 07:12  Lying BP: --/-- HR: --  Sitting BP: 134/75 HR: 79  Standing BP: 122/82 HR: 81  Site: --  Mode: --  Orthostatic VS  04-24-24 @ 20:34  Lying BP: --/-- HR: --  Sitting BP: 127/63 HR: 88  Standing BP: 127/84 HR: 99  Site: --  Mode: --    Lipid Panel: Date/Time: 04-17-24 @ 08:00  Cholesterol, Serum: 129  LDL Cholesterol Calculated: 59  HDL Cholesterol, Serum: 56  Total Cholesterol/HDL Ration Measurement: --  Triglycerides, Serum: 70

## 2024-04-24 NOTE — BH INPATIENT PSYCHIATRY PROGRESS NOTE - CURRENT MEDICATION
MEDICATIONS  (STANDING):  amLODIPine   Tablet 2.5 milliGRAM(s) Oral daily  aspirin  chewable 81 milliGRAM(s) Oral daily  atorvastatin 20 milliGRAM(s) Oral at bedtime  busPIRone 10 milliGRAM(s) Oral two times a day  FLUoxetine 40 milliGRAM(s) Oral daily  losartan 50 milliGRAM(s) Oral daily  polyethylene glycol 3350 17 Gram(s) Oral daily  senna 2 Tablet(s) Oral at bedtime  traZODone 100 milliGRAM(s) Oral at bedtime    MEDICATIONS  (PRN):  acetaminophen     Tablet .. 650 milliGRAM(s) Oral every 6 hours PRN Mild Pain (1 - 3), Moderate Pain (4 - 6), Severe Pain (7 - 10)  aluminum hydroxide/magnesium hydroxide/simethicone Suspension 30 milliLiter(s) Oral every 6 hours PRN Dyspepsia  bisacodyl 5 milliGRAM(s) Oral at bedtime PRN Constipation  LORazepam     Tablet 0.5 milliGRAM(s) Oral every 6 hours PRN Anxiety  LORazepam     Tablet 1 milliGRAM(s) Oral every 6 hours PRN Severe Anxiety/Agitation  LORazepam   Injectable 2 milliGRAM(s) IntraMuscular Once PRN severe agitation  magnesium hydroxide Suspension 30 milliLiter(s) Oral daily PRN Constipation   MEDICATIONS  (STANDING):  amLODIPine   Tablet 2.5 milliGRAM(s) Oral daily  aspirin  chewable 81 milliGRAM(s) Oral daily  atorvastatin 20 milliGRAM(s) Oral at bedtime  busPIRone 10 milliGRAM(s) Oral two times a day  FLUoxetine 40 milliGRAM(s) Oral daily  losartan 50 milliGRAM(s) Oral daily  polyethylene glycol 3350 17 Gram(s) Oral daily  senna 2 Tablet(s) Oral at bedtime  traZODone 100 milliGRAM(s) Oral at bedtime    MEDICATIONS  (PRN):  acetaminophen     Tablet .. 650 milliGRAM(s) Oral every 6 hours PRN Mild Pain (1 - 3), Moderate Pain (4 - 6), Severe Pain (7 - 10)  aluminum hydroxide/magnesium hydroxide/simethicone Suspension 30 milliLiter(s) Oral every 6 hours PRN Dyspepsia  bisacodyl 5 milliGRAM(s) Oral at bedtime PRN Constipation  LORazepam     Tablet 1 milliGRAM(s) Oral every 6 hours PRN Severe Anxiety/Agitation  LORazepam     Tablet 0.5 milliGRAM(s) Oral every 6 hours PRN Anxiety  LORazepam   Injectable 2 milliGRAM(s) IntraMuscular Once PRN severe agitation  magnesium hydroxide Suspension 30 milliLiter(s) Oral daily PRN Constipation

## 2024-04-24 NOTE — BH DISCHARGE NOTE NURSING/SOCIAL WORK/PSYCH REHAB - PATIENT PORTAL LINK FT
Order has to be placed as \"service to [facility] home draw\" so I ordered labs as such. I spoke to pt to let her know they will be coming tomorrow to draw her. I also called [facility] labs and confirmed they are able to draw her tomorrow and will do so. Pt agreed to keep upcoming appts with us and Dr Ludy cervantesi   You can access the FollowMyHealth Patient Portal offered by [facility] by registering at the following website: [email or web address]. By joining [facility]’s FollowMyHealth portal, you will also be able to view your health information using other applications (apps) compatible with our system.

## 2024-04-24 NOTE — BH DISCHARGE NOTE NURSING/SOCIAL WORK/PSYCH REHAB - NSDCPRGOAL_PSY_ALL_CORE
Writer met with Pt for a 1:1 session prior to her discharge. Pt was minimally verbal, calm, and pleasant during the session. Upon approach Pt was in her bed and was seen by the bedside. Pt reported feeling okay. Pt denied SI/HI/AH/VH. During this hospitalization, Pt met goal and able to identify watching television, assisting her  in house chores and sitting in the porch for fresh air as her coping skills, Pt was compliant with meds. Pt avoided social contact despite the encouragement of writer and the team Pt did not attend any of the groups or activities of the unit. Pt was complaint with meds and ECT. Pt did not engage with peers. Pt completed a safety plan.

## 2024-04-24 NOTE — BH DISCHARGE NOTE NURSING/SOCIAL WORK/PSYCH REHAB - NSCDUDCCRISIS_PSY_A_CORE
Replaced by [facility]  [phone] Replaced by [facility]-WELL [phone])  Text "WELL" to [phone]  Website: www.MyNines/.[facility] 1 (180) 621-HOPE (5095) Website: www.safehorizon.org/.National Suicide Prevention Lifeline 6 (871) 180-0355/.  [facility]  1 (618) LIFENET (982-6654)/.  [facility]  (551) 533-6650/.  [facility] Behavioral Health Helpline / [facility]  (344) 227-TALK (2956)/.  [facility]  [phone] [place] [phone]   Hours:  Monday through Friday from 9 AM to 3 PM/988 Suicide and Crisis Lifeline

## 2024-04-24 NOTE — BH INPATIENT PSYCHIATRY PROGRESS NOTE - NSBHCHARTREVIEWVS_PSY_A_CORE FT
Vital Signs Last 24 Hrs  T(C): 36.2 (04-24-24 @ 07:44), Max: 36.6 (04-23-24 @ 20:43)  T(F): 97.2 (04-24-24 @ 07:44), Max: 97.8 (04-23-24 @ 20:43)  HR: --  BP: --  BP(mean): --  RR: 18 (04-24-24 @ 07:44) (18 - 19)  SpO2: --    Orthostatic VS  04-24-24 @ 07:44  Lying BP: --/-- HR: --  Sitting BP: 111/74 HR: 112  Standing BP: 108/68 HR: 113  Site: --  Mode: --  Orthostatic VS  04-23-24 @ 20:43  Lying BP: --/-- HR: --  Sitting BP: 127/84 HR: 83  Standing BP: 132/81 HR: 90  Site: --  Mode: --  Orthostatic VS  04-23-24 @ 07:35  Lying BP: --/-- HR: --  Sitting BP: 121/84 HR: 91  Standing BP: 120/74 HR: 99  Site: --  Mode: --  Orthostatic VS  04-22-24 @ 20:30  Lying BP: --/-- HR: --  Sitting BP: 133/84 HR: 83  Standing BP: 128/81 HR: 91  Site: --  Mode: --   Vital Signs Last 24 Hrs  T(C): 36.4 (04-26-24 @ 11:47), Max: 36.4 (04-25-24 @ 20:20)  T(F): 97.6 (04-26-24 @ 11:47), Max: 97.6 (04-25-24 @ 20:20)  HR: 100 (04-26-24 @ 11:50) (73 - 100)  BP: 171/100 (04-26-24 @ 11:50) (138/76 - 171/100)  BP(mean): --  RR: 22 (04-26-24 @ 11:50) (18 - 22)  SpO2: 99% (04-26-24 @ 11:50) (98% - 99%)    Orthostatic VS  04-26-24 @ 07:53  Lying BP: --/-- HR: --  Sitting BP: 134/89 HR: 79  Standing BP: 119/81 HR: 87  Site: --  Mode: --  Orthostatic VS  04-25-24 @ 20:20  Lying BP: --/-- HR: --  Sitting BP: 140/79 HR: 89  Standing BP: 139/75 HR: 91  Site: --  Mode: --  Orthostatic VS  04-25-24 @ 07:12  Lying BP: --/-- HR: --  Sitting BP: 134/75 HR: 79  Standing BP: 122/82 HR: 81  Site: --  Mode: --  Orthostatic VS  04-24-24 @ 20:34  Lying BP: --/-- HR: --  Sitting BP: 127/63 HR: 88  Standing BP: 127/84 HR: 99  Site: --  Mode: --

## 2024-04-24 NOTE — BH INPATIENT PSYCHIATRY PROGRESS NOTE - OTHER
walking improving, using walker  "Alright...the same" Smiling at times psychomotor retardation  Lying in bed, sits up at times

## 2024-04-24 NOTE — BH DISCHARGE NOTE NURSING/SOCIAL WORK/PSYCH REHAB - NSDCPRRECOMMEND_PSY_ALL_CORE
Psych Rehab staff encouraged the Pt to continue outpatient assistance for ongoing medication management, support, and psychotherapy as well as to explore and utilize effective coping skills to manage symptoms.

## 2024-04-25 PROCEDURE — 99232 SBSQ HOSP IP/OBS MODERATE 35: CPT

## 2024-04-25 RX ADMIN — ATORVASTATIN CALCIUM 20 MILLIGRAM(S): 80 TABLET, FILM COATED ORAL at 21:19

## 2024-04-25 RX ADMIN — AMLODIPINE BESYLATE 2.5 MILLIGRAM(S): 2.5 TABLET ORAL at 09:04

## 2024-04-25 RX ADMIN — Medication 100 MILLIGRAM(S): at 21:18

## 2024-04-25 RX ADMIN — Medication 40 MILLIGRAM(S): at 09:04

## 2024-04-25 RX ADMIN — LOSARTAN POTASSIUM 50 MILLIGRAM(S): 100 TABLET, FILM COATED ORAL at 09:04

## 2024-04-25 RX ADMIN — Medication 10 MILLIGRAM(S): at 09:03

## 2024-04-25 RX ADMIN — Medication 10 MILLIGRAM(S): at 21:18

## 2024-04-25 RX ADMIN — SENNA PLUS 2 TABLET(S): 8.6 TABLET ORAL at 21:18

## 2024-04-25 RX ADMIN — Medication 81 MILLIGRAM(S): at 09:03

## 2024-04-25 NOTE — BH INPATIENT PSYCHIATRY PROGRESS NOTE - NSBHATTESTBILLING_PSY_A_CORE
31561-Nildsjvrbz OBS or IP - low complexity OR 25-34 mins
76466-Jhkfoxvttd OBS or IP - moderate complexity OR 35-49 mins
85777-Vnzfdqhvbv OBS or IP - moderate complexity OR 35-49 mins
13638-Joxfkcyxek OBS or IP - low complexity OR 25-34 mins
80423-Uuajhuypuf OBS or IP - moderate complexity OR 35-49 mins
47603-Hoaajtylsc OBS or IP - moderate complexity OR 35-49 mins
51775-Ekgttjerfa OBS or IP - moderate complexity OR 35-49 mins
67206-Cjcvusflcu OBS or IP - moderate complexity OR 35-49 mins
62372-Qhgmujanlj OBS or IP - moderate complexity OR 35-49 mins
92446-Chzapgiybd OBS or IP - moderate complexity OR 35-49 mins
77496-Pujsybgcib OBS or IP - moderate complexity OR 35-49 mins
86555-Yeyjbdclpv OBS or IP - moderate complexity OR 35-49 mins
85773-Gonvpzofvk OBS or IP - moderate complexity OR 35-49 mins
93161-Qqbyjxwltp OBS or IP - moderate complexity OR 35-49 mins
98528-Lnadroztvs OBS or IP - moderate complexity OR 35-49 mins
22102-Zzjgfhxyxz OBS or IP - moderate complexity OR 35-49 mins
89044-Dhzgjcslrs OBS or IP - moderate complexity OR 35-49 mins
31254-Mvpucodkkd OBS or IP - moderate complexity OR 35-49 mins
58872-Qwcwcsvqgs OBS or IP - moderate complexity OR 35-49 mins
98470-Ppwmovwcnd OBS or IP - moderate complexity OR 35-49 mins
86124-Wkabtftmoq OBS or IP - moderate complexity OR 35-49 mins
45502-Xmhtykhsyv OBS or IP - moderate complexity OR 35-49 mins
60082-Oxbajgxbui OBS or IP - moderate complexity OR 35-49 mins
50097-Dpnkvieuaa OBS or IP - moderate complexity OR 35-49 mins
69475-Bmvzlittad OBS or IP - moderate complexity OR 35-49 mins
51812-Fvwcyuelxx OBS or IP - moderate complexity OR 35-49 mins

## 2024-04-25 NOTE — BH INPATIENT PSYCHIATRY PROGRESS NOTE - NSTXMEDICGOAL_PSY_ALL_CORE
Be able to describe the benefit of medication/treatment

## 2024-04-25 NOTE — BH INPATIENT PSYCHIATRY PROGRESS NOTE - NSBHMSETHTPROC_PSY_A_CORE
Linear

## 2024-04-25 NOTE — BH INPATIENT PSYCHIATRY PROGRESS NOTE - NSTXMEDICDATEEST_PSY_ALL_CORE
04-Apr-2024
22-Mar-2024
04-Apr-2024
04-Apr-2024
22-Mar-2024
04-Apr-2024
22-Mar-2024
04-Apr-2024
22-Mar-2024

## 2024-04-25 NOTE — BH INPATIENT PSYCHIATRY PROGRESS NOTE - NSBHMSESPEECH_PSY_A_CORE
Abnormal as indicated, otherwise normal...
Abnormal as indicated, otherwise normal...
Normal volume, rate, productivity, spontaneity and articulation
Abnormal as indicated, otherwise normal...
Normal volume, rate, productivity, spontaneity and articulation
Abnormal as indicated, otherwise normal...
Normal volume, rate, productivity, spontaneity and articulation
Normal volume, rate, productivity, spontaneity and articulation
Abnormal as indicated, otherwise normal...
Normal volume, rate, productivity, spontaneity and articulation
Abnormal as indicated, otherwise normal...
Normal volume, rate, productivity, spontaneity and articulation
Normal volume, rate, productivity, spontaneity and articulation
Abnormal as indicated, otherwise normal...
Normal volume, rate, productivity, spontaneity and articulation

## 2024-04-25 NOTE — BH INPATIENT PSYCHIATRY PROGRESS NOTE - NSBHMSERECMEM_PSY_A_CORE
Impaired
Normal
Normal
Impaired
Normal
Normal
Impaired
Normal
Normal
Impaired
Impaired
Normal
Impaired
Normal
Impaired
Normal
Impaired
Impaired

## 2024-04-25 NOTE — BH INPATIENT PSYCHIATRY PROGRESS NOTE - NSTXDCOTHRINTERMD_PSY_ALL_CORE
Psycho ed

## 2024-04-25 NOTE — BH INPATIENT PSYCHIATRY PROGRESS NOTE - NSICDXBHPRIMARYDX_PSY_ALL_CORE
Severe episode of recurrent major depressive disorder, without psychotic features   F33.2  

## 2024-04-25 NOTE — BH INPATIENT PSYCHIATRY PROGRESS NOTE - NSBHMSEGROOM_PSY_A_CORE
Fair
Poor
Good
Fair
Fair
Good
Poor
Good
Fair
Poor
Fair
Good
Fair
Fair
Good

## 2024-04-25 NOTE — BH INPATIENT PSYCHIATRY PROGRESS NOTE - GENERAL APPEARANCE
No deformities present/Other
No deformities present
No deformities present/Other
No deformities present/Other
No deformities present
No deformities present/Other
No deformities present
No deformities present/Other
No deformities present
No deformities present/Other
No deformities present
No deformities present/Other
No deformities present
No deformities present/Other
No deformities present
No deformities present/Other
No deformities present
No deformities present/Other

## 2024-04-25 NOTE — BH INPATIENT PSYCHIATRY PROGRESS NOTE - NSTXMEDICDATENEW_PSY_ALL_CORE
11-Apr-2024

## 2024-04-25 NOTE — BH INPATIENT PSYCHIATRY PROGRESS NOTE - NSBHMSEJUDGE_PSY_A_CORE
Fair
Good
Good
Fair
Good
Fair
Good
Good
Fair
Good
Fair
Fair
Good
Fair
Good
Good
Fair
Fair
Good
Fair
Good
Fair
Good
Fair

## 2024-04-25 NOTE — BH INPATIENT PSYCHIATRY PROGRESS NOTE - NSBHMSEEYE_PSY_A_CORE
Fair
Fair
Good
Fair
Fair
Good
Fair
Good
Fair
Good
Fair
Fair
Good
Fair
Good
Fair
Good

## 2024-04-25 NOTE — BH INPATIENT PSYCHIATRY PROGRESS NOTE - NSBHMSEHYG_PSY_A_CORE
Fair
Good
Fair
Fair
Good
Fair
Good
Good
Fair
Good
Good
Fair
Good
Fair
Good
Fair
Good
Fair

## 2024-04-25 NOTE — BH INPATIENT PSYCHIATRY PROGRESS NOTE - NSBHCHARTREVIEWVS_PSY_A_CORE FT
Vital Signs Last 24 Hrs  T(C): 36.4 (04-25-24 @ 20:20), Max: 36.4 (04-25-24 @ 20:20)  T(F): 97.6 (04-25-24 @ 20:20), Max: 97.6 (04-25-24 @ 20:20)  HR: --  BP: --  BP(mean): --  RR: 18 (04-25-24 @ 20:20) (18 - 18)  SpO2: 98% (04-25-24 @ 04:34) (97% - 98%)    Orthostatic VS  04-25-24 @ 20:20  Lying BP: --/-- HR: --  Sitting BP: 140/79 HR: 89  Standing BP: 139/75 HR: 91  Site: --  Mode: --  Orthostatic VS  04-25-24 @ 07:12  Lying BP: --/-- HR: --  Sitting BP: 134/75 HR: 79  Standing BP: 122/82 HR: 81  Site: --  Mode: --  Orthostatic VS  04-24-24 @ 20:34  Lying BP: --/-- HR: --  Sitting BP: 127/63 HR: 88  Standing BP: 127/84 HR: 99  Site: --  Mode: --  Orthostatic VS  04-24-24 @ 07:44  Lying BP: --/-- HR: --  Sitting BP: 111/74 HR: 112  Standing BP: 108/68 HR: 113  Site: --  Mode: --   Vital Signs Last 24 Hrs  T(C): 36.4 (04-26-24 @ 11:47), Max: 36.4 (04-25-24 @ 20:20)  T(F): 97.6 (04-26-24 @ 11:47), Max: 97.6 (04-25-24 @ 20:20)  HR: 106 (04-26-24 @ 11:55) (73 - 106)  BP: 170/78 (04-26-24 @ 11:55) (138/76 - 171/100)  BP(mean): --  RR: 22 (04-26-24 @ 11:55) (18 - 22)  SpO2: 95% (04-26-24 @ 11:55) (95% - 99%)    Orthostatic VS  04-26-24 @ 07:53  Lying BP: --/-- HR: --  Sitting BP: 134/89 HR: 79  Standing BP: 119/81 HR: 87  Site: --  Mode: --  Orthostatic VS  04-25-24 @ 20:20  Lying BP: --/-- HR: --  Sitting BP: 140/79 HR: 89  Standing BP: 139/75 HR: 91  Site: --  Mode: --  Orthostatic VS  04-25-24 @ 07:12  Lying BP: --/-- HR: --  Sitting BP: 134/75 HR: 79  Standing BP: 122/82 HR: 81  Site: --  Mode: --  Orthostatic VS  04-24-24 @ 20:34  Lying BP: --/-- HR: --  Sitting BP: 127/63 HR: 88  Standing BP: 127/84 HR: 99  Site: --  Mode: --

## 2024-04-25 NOTE — BH INPATIENT PSYCHIATRY PROGRESS NOTE - NSBHMSEMOOD_PSY_A_CORE
Depressed/Anxious/Angry/Other
Normal/Other
Depressed/Other
Depressed/Other
Depressed
Depressed/Other
Depressed/Anxious/Angry/Other
Depressed/Anxious
Depressed/Anxious
Normal/Other
Normal/Other
Depressed/Other
Depressed/Other
Anxious/Other
Depressed/Anxious
Depressed/Anxious/Other
Normal/Other
Depressed/Anxious/Other
Normal/Other
Depressed/Anxious
Normal/Other
Other
Normal/Other

## 2024-04-25 NOTE — BH INPATIENT PSYCHIATRY PROGRESS NOTE - NSDCCRITERIA_PSY_ALL_CORE
Remission of symptoms: mood stability, improved anxiety, ability to complete ADLs.

## 2024-04-25 NOTE — BH INPATIENT PSYCHIATRY PROGRESS NOTE - NSTXDEPRESGOAL_PSY_ALL_CORE
Will identify 2 coping skills that assist in improving mood

## 2024-04-25 NOTE — BH INPATIENT PSYCHIATRY PROGRESS NOTE - NSBHMETABOLIC_PSY_ALL_CORE_FT
BMI: BMI (kg/m2): 33.6 (03-22-24 @ 12:45)  HbA1c: A1C with Estimated Average Glucose Result: 5.1 % (04-17-24 @ 08:00)    Glucose:   BP: --Vital Signs Last 24 Hrs  T(C): 36.4 (04-25-24 @ 20:20), Max: 36.4 (04-25-24 @ 20:20)  T(F): 97.6 (04-25-24 @ 20:20), Max: 97.6 (04-25-24 @ 20:20)  HR: --  BP: --  BP(mean): --  RR: 18 (04-25-24 @ 20:20) (18 - 18)  SpO2: 98% (04-25-24 @ 04:34) (97% - 98%)    Orthostatic VS  04-25-24 @ 20:20  Lying BP: --/-- HR: --  Sitting BP: 140/79 HR: 89  Standing BP: 139/75 HR: 91  Site: --  Mode: --  Orthostatic VS  04-25-24 @ 07:12  Lying BP: --/-- HR: --  Sitting BP: 134/75 HR: 79  Standing BP: 122/82 HR: 81  Site: --  Mode: --  Orthostatic VS  04-24-24 @ 20:34  Lying BP: --/-- HR: --  Sitting BP: 127/63 HR: 88  Standing BP: 127/84 HR: 99  Site: --  Mode: --  Orthostatic VS  04-24-24 @ 07:44  Lying BP: --/-- HR: --  Sitting BP: 111/74 HR: 112  Standing BP: 108/68 HR: 113  Site: --  Mode: --    Lipid Panel: Date/Time: 04-17-24 @ 08:00  Cholesterol, Serum: 129  LDL Cholesterol Calculated: 59  HDL Cholesterol, Serum: 56  Total Cholesterol/HDL Ration Measurement: --  Triglycerides, Serum: 70   BMI: BMI (kg/m2): 33.6 (03-22-24 @ 12:45)  HbA1c: A1C with Estimated Average Glucose Result: 5.1 % (04-17-24 @ 08:00)    Glucose:   BP: 170/78 (04-26-24 @ 11:55) (135/81 - 171/100)Vital Signs Last 24 Hrs  T(C): 36.4 (04-26-24 @ 11:47), Max: 36.4 (04-25-24 @ 20:20)  T(F): 97.6 (04-26-24 @ 11:47), Max: 97.6 (04-25-24 @ 20:20)  HR: 106 (04-26-24 @ 11:55) (73 - 106)  BP: 170/78 (04-26-24 @ 11:55) (138/76 - 171/100)  BP(mean): --  RR: 22 (04-26-24 @ 11:55) (18 - 22)  SpO2: 95% (04-26-24 @ 11:55) (95% - 99%)    Orthostatic VS  04-26-24 @ 07:53  Lying BP: --/-- HR: --  Sitting BP: 134/89 HR: 79  Standing BP: 119/81 HR: 87  Site: --  Mode: --  Orthostatic VS  04-25-24 @ 20:20  Lying BP: --/-- HR: --  Sitting BP: 140/79 HR: 89  Standing BP: 139/75 HR: 91  Site: --  Mode: --  Orthostatic VS  04-25-24 @ 07:12  Lying BP: --/-- HR: --  Sitting BP: 134/75 HR: 79  Standing BP: 122/82 HR: 81  Site: --  Mode: --  Orthostatic VS  04-24-24 @ 20:34  Lying BP: --/-- HR: --  Sitting BP: 127/63 HR: 88  Standing BP: 127/84 HR: 99  Site: --  Mode: --    Lipid Panel: Date/Time: 04-17-24 @ 08:00  Cholesterol, Serum: 129  LDL Cholesterol Calculated: 59  HDL Cholesterol, Serum: 56  Total Cholesterol/HDL Ration Measurement: --  Triglycerides, Serum: 70

## 2024-04-25 NOTE — BH INPATIENT PSYCHIATRY PROGRESS NOTE - NSTXDCOTHRGOAL_PSY_ALL_CORE
Patient will report improved mood and insight into coping skills for symptoms, and with no SIIP.

## 2024-04-25 NOTE — BH INPATIENT PSYCHIATRY PROGRESS NOTE - NSTXCOPEPROGRES_PSY_ALL_CORE
No Change
Improving
No Change
No Change
Met - goal discontinued
Improving
No Change
Met - goal discontinued
No Change
Improving
No Change
No Change
Improving
No Change

## 2024-04-25 NOTE — BH INPATIENT PSYCHIATRY PROGRESS NOTE - NSBHMSEINSIGHT_PSY_A_CORE
Fair
Good
Good
Fair
Fair
Good
Good
Fair
Good
Good
Fair
Good
Fair
Good

## 2024-04-25 NOTE — BH INPATIENT PSYCHIATRY PROGRESS NOTE - OTHER
walking improving, using walker  psychomotor retardation  "Alright...the same" Lying in bed, sits up at times Smiling at times

## 2024-04-25 NOTE — BH INPATIENT PSYCHIATRY PROGRESS NOTE - NSTXPROBDCOTHR_PSY_ALL_CORE
DISCHARGE ISSUE - OTHER

## 2024-04-25 NOTE — BH INPATIENT PSYCHIATRY PROGRESS NOTE - NSTXCOPEINTERMD_PSY_ALL_CORE
Group and milieu therapy 

## 2024-04-25 NOTE — BH INPATIENT PSYCHIATRY PROGRESS NOTE - NSBHFUPMEDSE_PSY_A_CORE
None known
Yes
Yes
None known
Yes
None known
Yes
None known
None known
Yes
None known
Yes
None known
None known
Yes
None known
Yes

## 2024-04-25 NOTE — BH INPATIENT PSYCHIATRY PROGRESS NOTE - NSTXANXDATEEST_PSY_ALL_CORE
04-Apr-2024
28-Mar-2024
04-Apr-2024
22-Mar-2024
04-Apr-2024
28-Mar-2024
04-Apr-2024
28-Mar-2024
22-Mar-2024
04-Apr-2024
04-Apr-2024
19-Apr-2024
04-Apr-2024
22-Mar-2024
19-Apr-2024
22-Mar-2024
19-Apr-2024
04-Apr-2024
22-Mar-2024
19-Apr-2024
28-Mar-2024
04-Apr-2024
28-Mar-2024
04-Apr-2024

## 2024-04-25 NOTE — BH INPATIENT PSYCHIATRY PROGRESS NOTE - NSBHMSEBEHAV_PSY_A_CORE
Cooperative

## 2024-04-25 NOTE — BH INPATIENT PSYCHIATRY PROGRESS NOTE - NSTXDEPRESINTERMD_PSY_ALL_CORE
Medication management, Group and milieu therapy 

## 2024-04-25 NOTE — BH INPATIENT PSYCHIATRY PROGRESS NOTE - NSBHMSEKNOWHOW_PSY_ALL_CORE
Vocabulary
Vocabulary
Current Events/Educational attainment/Vocabulary
Vocabulary

## 2024-04-25 NOTE — BH INPATIENT PSYCHIATRY PROGRESS NOTE - NSTXCOPEDATETRGT_PSY_ALL_CORE
24-Apr-2024
23-Apr-2024
24-Apr-2024
26-Mar-2024
26-Mar-2024
05-Apr-2024
02-Apr-2024
17-Apr-2024
17-Apr-2024
05-Apr-2024
17-Apr-2024
30-Apr-2024
17-Apr-2024
26-Mar-2024
02-Apr-2024
11-Apr-2024
09-Apr-2024
05-Apr-2024
26-Apr-2024
11-Apr-2024
02-Apr-2024
11-Apr-2024

## 2024-04-25 NOTE — BH INPATIENT PSYCHIATRY PROGRESS NOTE - NSTXCOPEDATEEST_PSY_ALL_CORE
04-Apr-2024
22-Mar-2024
29-Mar-2024
04-Apr-2024
22-Mar-2024
29-Mar-2024
29-Mar-2024
22-Mar-2024
29-Mar-2024
04-Apr-2024
29-Mar-2024
29-Mar-2024
22-Mar-2024
04-Apr-2024
29-Mar-2024
22-Mar-2024
29-Mar-2024
22-Mar-2024
04-Apr-2024
29-Mar-2024
19-Apr-2024
19-Apr-2024
29-Mar-2024

## 2024-04-25 NOTE — BH INPATIENT PSYCHIATRY PROGRESS NOTE - NSTXPROBCOPE_PSY_ALL_CORE
COPING, INEFFECTIVE

## 2024-04-25 NOTE — BH INPATIENT PSYCHIATRY PROGRESS NOTE - NSTXDCOTHRDATEEST_PSY_ALL_CORE
15-Apr-2024
15-Apr-2024
22-Apr-2024
01-Apr-2024
22-Apr-2024
01-Apr-2024
08-Apr-2024
01-Apr-2024
25-Mar-2024
08-Apr-2024
01-Apr-2024
25-Mar-2024
15-Apr-2024
08-Apr-2024
25-Mar-2024
01-Apr-2024
15-Apr-2024
22-Apr-2024
25-Mar-2024
08-Apr-2024
25-Mar-2024
08-Apr-2024
15-Apr-2024
22-Apr-2024

## 2024-04-25 NOTE — BH INPATIENT PSYCHIATRY PROGRESS NOTE - NSTXOBSCOMPROGRES_PSY_ALL_CORE
No Change

## 2024-04-25 NOTE — BH INPATIENT PSYCHIATRY PROGRESS NOTE - NSTXMEDICDATETRGT_PSY_ALL_CORE
16-Apr-2024
11-Apr-2024
26-Mar-2024
02-Apr-2024
02-Apr-2024
30-Apr-2024
02-Apr-2024
30-Apr-2024
02-Apr-2024
16-Apr-2024
23-Apr-2024
16-Apr-2024
09-Apr-2024
23-Apr-2024
23-Apr-2024
16-Apr-2024
11-Apr-2024
23-Apr-2024
26-Mar-2024
02-Apr-2024
11-Apr-2024
26-Mar-2024
30-Apr-2024
02-Apr-2024

## 2024-04-25 NOTE — BH INPATIENT PSYCHIATRY PROGRESS NOTE - NSBHMSERELATED_PSY_A_CORE
Good
Good
Fair
Fair
Good
Fair
Good
Fair
Good
Fair
Good
Fair
Good
Fair
Good

## 2024-04-25 NOTE — BH INPATIENT PSYCHIATRY PROGRESS NOTE - NSTXDEPRESDATETRGT_PSY_ALL_CORE
16-Apr-2024
09-Apr-2024
02-Apr-2024
30-Apr-2024
11-Apr-2024
26-Mar-2024
02-Apr-2024
02-Apr-2024
26-Mar-2024
16-Apr-2024
26-Apr-2024
11-Apr-2024
23-Apr-2024
11-Apr-2024
26-Mar-2024
23-Apr-2024
30-Apr-2024
30-Apr-2024
23-Apr-2024
11-Apr-2024
16-Apr-2024
02-Apr-2024
02-Apr-2024
11-Apr-2024
16-Apr-2024
02-Apr-2024

## 2024-04-25 NOTE — BH INPATIENT PSYCHIATRY PROGRESS NOTE - NSTXOBSCOMGOAL_PSY_ALL_CORE
Will identify anxious feelings associated with obsess thoughts

## 2024-04-25 NOTE — BH INPATIENT PSYCHIATRY PROGRESS NOTE - NSBHMSEGAIT_PSY_A_CORE
Other
Normal gait / station
Other
Normal gait / station
Other
Other
Normal gait / station
Other
Other
Normal gait / station
Other

## 2024-04-25 NOTE — BH INPATIENT PSYCHIATRY PROGRESS NOTE - NSTXOBSCOMINTERMD_PSY_ALL_CORE
Medication management, Group and milieu therapy

## 2024-04-25 NOTE — BH INPATIENT PSYCHIATRY PROGRESS NOTE - NSTXOBSCOMDATETRGT_PSY_ALL_CORE
02-Apr-2024
26-Mar-2024
11-Apr-2024
11-Apr-2024
26-Mar-2024
23-Apr-2024
02-Apr-2024
23-Apr-2024
09-Apr-2024
02-Apr-2024
23-Apr-2024
16-Apr-2024
26-Mar-2024
16-Apr-2024
30-Apr-2024
11-Apr-2024
16-Apr-2024
02-Apr-2024
30-Apr-2024
02-Apr-2024
11-Apr-2024
02-Apr-2024
16-Apr-2024
30-Apr-2024
23-Apr-2024
11-Apr-2024

## 2024-04-25 NOTE — BH INPATIENT PSYCHIATRY PROGRESS NOTE - NSTXANXDATETRGT_PSY_ALL_CORE
16-Apr-2024
23-Apr-2024
09-Apr-2024
16-Apr-2024
30-Apr-2024
16-Apr-2024
26-Mar-2024
09-Apr-2024
30-Apr-2024
09-Apr-2024
30-Apr-2024
16-Apr-2024
26-Mar-2024
23-Apr-2024
11-Apr-2024
02-Apr-2024
11-Apr-2024
26-Mar-2024
02-Apr-2024
11-Apr-2024
09-Apr-2024
11-Apr-2024
26-Apr-2024
23-Apr-2024
09-Apr-2024
11-Apr-2024

## 2024-04-25 NOTE — BH INPATIENT PSYCHIATRY PROGRESS NOTE - NSTXANXGOAL_PSY_ALL_CORE
Identify and practice 3 coping skills to manage anxiety
Be able to participate in activities despite lingering anxiety/panic
Identify and practice 3 coping skills to manage anxiety
Identify and practice 3 coping skills to manage anxiety
Be able to participate in activities despite lingering anxiety/panic
Identify and practice 3 coping skills to manage anxiety
Identify and practice 3 coping skills to manage anxiety
Be able to participate in activities despite lingering anxiety/panic
Be able to participate in activities despite lingering anxiety/panic
Identify and practice 3 coping skills to manage anxiety
Be able to participate in activities despite lingering anxiety/panic
Identify and practice 3 coping skills to manage anxiety

## 2024-04-25 NOTE — BH INPATIENT PSYCHIATRY PROGRESS NOTE - NSBHMSEAFFCONG_PSY_A_CORE
Congruent
Other
Congruent

## 2024-04-25 NOTE — BH INPATIENT PSYCHIATRY PROGRESS NOTE - NSTXMEDICINTERMD_PSY_ALL_CORE
Psychoeducation

## 2024-04-25 NOTE — BH INPATIENT PSYCHIATRY PROGRESS NOTE - NSTXPROBOBSCOM_PSY_ALL_CORE
OBSESSIONS/COMPULSIONS

## 2024-04-25 NOTE — BH INPATIENT PSYCHIATRY PROGRESS NOTE - NSTXDEPRESDATEEST_PSY_ALL_CORE
22-Mar-2024
04-Apr-2024
19-Apr-2024
04-Apr-2024
22-Mar-2024
04-Apr-2024
19-Apr-2024
04-Apr-2024
19-Apr-2024
04-Apr-2024
04-Apr-2024
22-Mar-2024
04-Apr-2024
22-Mar-2024
04-Apr-2024
19-Apr-2024
22-Mar-2024
04-Apr-2024
22-Mar-2024
22-Mar-2024
04-Apr-2024
04-Apr-2024
22-Mar-2024
04-Apr-2024
22-Mar-2024
22-Mar-2024

## 2024-04-25 NOTE — BH INPATIENT PSYCHIATRY PROGRESS NOTE - NSBHCONTPROVIDER_PSY_ALL_CORE
No, attempted...
Yes...
Yes...
No, attempted...
Yes...
Yes...
No, attempted...
No, attempted...
Yes...
No, attempted...
Yes...
Yes...
No, attempted...
Yes...
No, attempted...
Yes...
Yes...

## 2024-04-25 NOTE — BH INPATIENT PSYCHIATRY PROGRESS NOTE - NSBHASSESSSUMMFT_PSY_ALL_CORE
69-year-old female, , no children, retired 3 years ago (was ), domiciled with  in a private residence. PPHx of Depression and LEO, engaged in outpatient tx with Dr Schmid x20+ years and therapist Cole Romero. No previous psychiatric admissions, denies previous suicide attempts, denies NSSIB. Pt denies all substance use. Pt denies legal issues and denies hx of violence/aggression. Denies access to firearms. Pertinent PMH HLD, HTN, IBS, BONIFACIO has CPAP (has not used for months). Referred by [facility] for poor functioning and inability to care for oneself due to worsening anxiety and depression. Patient reports symptoms of depression including dysphoric mood, diminished interest in activities, and fatigue/little energy that prevents her from completing ADLs. She reports recent history of 20 lb weight loss "months ago" and states that she gained back 10 lbs. Additionally reports severe anxiety that prevents her from leaving the house, states that she last left her home ~1 month ago. Denies AVH. Denies SI/I/P. Displays insight into need for pharmacologic treatment for depression/anxiety.    Hospital course: Patient admitted to [facility]. Started on home psychiatric regimen of klonopin 1mg TID, rexulti 1.5mg qD, buspirone 20mg qD, hydroxyzine 10mg TID, trazodone 100 mg QHS. Started on Prozac 10mg qD, gradually increased to 40mg. Klonopin and hydroxyzine d/jacinda, buspirone decreased. ECT started on 4/16 with improvement in symptoms. Rexulti d/jacinda 4/11 due to side effects (shakiness). MOCA on 3/26 (prior to ECT #1) - 15, MOCA on 4/10 (prior to ECT #4) - 21, MOCA on 4/15 (prior to ECT #6) - 25.     On assessment patient was less depressed, some anxiety about upcoming discharge. Ambulation improved. Denies medication side effects. Denies SI/I/P.     Plan:  1. Admit to [facility], 9.13 legal status.    2. Routine observation with q 15 minute checks. Patient denies suicidality, remains in behavioral control.    3. Psych: continue home medication regimen pending collateral.  ECT #8 today  MOCA 4/15 - 25  MOCA 4/10 - 21  MOCA 3/26 - 15  Prozac 40 mg daily  Buspirone 10 mg BID  Trazodone 100 mg QHS  D/C Rexulti  --PRN Ativan 2 mg IM for severe agitation    4. Medical: continue home medication regimen. Labs reviewed.  Amlodipine 2.5 mg daily   Aspirin 81 mg daily  Losartan 50 mg daily  Hydrochlorothiazide 25 mg po daily  Pravastatin 20 mg po daily  B complex/vitamin C/multivitamin    5. Encourage individual, group, and milieu therapy.    6. Collaboration with outpatient psychiatrist Dr. Chester Schmid, [phone].    7. Dispo collaboration with social work.  69-year-old female, , no children, retired 3 years ago (was ), domiciled with  in a private residence. PPHx of Depression and LEO, engaged in outpatient tx with Dr Schmid x20+ years and therapist Cole Romero. No previous psychiatric admissions, denies previous suicide attempts, denies NSSIB. Pt denies all substance use. Pt denies legal issues and denies hx of violence/aggression. Denies access to firearms. Pertinent PMH HLD, HTN, IBS, BONIFACIO has CPAP (has not used for months). Referred by [facility] for poor functioning and inability to care for oneself due to worsening anxiety and depression. Patient reports symptoms of depression including dysphoric mood, diminished interest in activities, and fatigue/little energy that prevents her from completing ADLs. She reports recent history of 20 lb weight loss "months ago" and states that she gained back 10 lbs. Additionally reports severe anxiety that prevents her from leaving the house, states that she last left her home ~1 month ago. Denies AVH. Denies SI/I/P. Displays insight into need for pharmacologic treatment for depression/anxiety.    Hospital course: Patient admitted to [facility]. Started on home psychiatric regimen of klonopin 1mg TID, rexulti 1.5mg qD, buspirone 20mg qD, hydroxyzine 10mg TID, trazodone 100 mg QHS. Started on Prozac 10mg qD, gradually increased to 40mg. Klonopin and hydroxyzine d/jacinda, buspirone decreased. ECT started on 4/16 with improvement in symptoms. Rexulti d/jacinda 4/11 due to side effects (shakiness). MOCA on 3/26 (prior to ECT #1) - 15, MOCA on 4/10 (prior to ECT #4) - 21, MOCA on 4/15 (prior to ECT #6) - 25.     On assessment patient was less depressed, some anxiety about upcoming discharge. Denies medication side effects. Denies SI/I/P. Plan for ECT tomorrow and discharge.     Plan:  1. Admit to [address] [place] [address] 9.13 legal status.    2. Routine observation with q 15 minute checks. Patient denies suicidality, remains in behavioral control.    3. Psych: continue home medication regimen pending collateral.  ECT #9 tomorrow  MOCA 4/15 - 25  MOCA 4/10 - 21  MOCA 3/26 - 15  Prozac 40 mg daily  Buspirone 10 mg BID  Trazodone 100 mg QHS  D/C Rexulti  --PRN Ativan 2 mg IM for severe agitation    4. Medical: continue home medication regimen. Labs reviewed.  Amlodipine 2.5 mg daily   Aspirin 81 mg daily  Losartan 50 mg daily  Hydrochlorothiazide 25 mg po daily  Pravastatin 20 mg po daily  B complex/vitamin C/multivitamin    5. Encourage individual, group, and milieu therapy.    6. Collaboration with outpatient psychiatrist Dr. Chester Schmid, [phone].    7. Dispo collaboration with social work. Discharge planning for tomorrow.

## 2024-04-25 NOTE — BH INPATIENT PSYCHIATRY PROGRESS NOTE - NSICDXBHSECONDARYDX_PSY_ALL_CORE
Severe episode of recurrent major depressive disorder, without psychotic features   F33.2  Anxiety   F41.9  
Anxiety   F41.9  
Severe episode of recurrent major depressive disorder, without psychotic features   F33.2  Anxiety   F41.9  
Anxiety   F41.9  
Severe episode of recurrent major depressive disorder, without psychotic features   F33.2  Anxiety   F41.9  
Anxiety   F41.9

## 2024-04-25 NOTE — BH INPATIENT PSYCHIATRY PROGRESS NOTE - NSBHMSEAFFRANGE_PSY_A_CORE
Constricted
Blunted/Other
Full
Constricted
Constricted
Blunted/Other
Constricted
Constricted
Blunted/Constricted
Blunted/Other
Blunted/Constricted
Full
Blunted/Other
Blunted/Other
Blunted/Constricted
Full/Blunted
Blunted/Other
Full
Constricted
Full
Full/Blunted
Blunted/Constricted
Full
Blunted/Other
Full
Blunted/Other

## 2024-04-25 NOTE — BH INPATIENT PSYCHIATRY PROGRESS NOTE - NSTXCOPEGOAL_PSY_ALL_CORE
Identify and utilize 2 coping skills that meet their needs

## 2024-04-25 NOTE — BH INPATIENT PSYCHIATRY PROGRESS NOTE - NSTXOBSCOMDATEEST_PSY_ALL_CORE
22-Mar-2024
22-Mar-2024
04-Apr-2024
22-Mar-2024
04-Apr-2024
22-Mar-2024
04-Apr-2024
22-Mar-2024
22-Mar-2024
04-Apr-2024
22-Mar-2024
04-Apr-2024
22-Mar-2024

## 2024-04-25 NOTE — BH INPATIENT PSYCHIATRY PROGRESS NOTE - NSTREATMENTCERTY_PSY_ALL_CORE

## 2024-04-25 NOTE — BH INPATIENT PSYCHIATRY PROGRESS NOTE - NSBHMSELANG_PSY_A_CORE
No abnormalities noted
No abnormalities noted/Normal naming/Normal repetition
No abnormalities noted

## 2024-04-25 NOTE — BH INPATIENT PSYCHIATRY PROGRESS NOTE - NSBHMSEMOVE_PSY_A_CORE
No abnormal movements/Other
No abnormal movements
No abnormal movements/Other
No abnormal movements
No abnormal movements/Other

## 2024-04-25 NOTE — BH INPATIENT PSYCHIATRY PROGRESS NOTE - NSBHCONSBHPROVDETAILS_PSY_A_CORE  FT
Chester Schmid at [phone] (per voicemail provider is only available M-F 9A-1P)
Chester Schmid at [phone] (per voicemail provider is only available M-F 9A-1P)
Left a voicemail with patient's outpatient psychiatrist, Chester Schmid at [phone] (per voicemail provider is only available M-F 9A-1P)
Chester Schmid at [phone] (per voicemail provider is only available M-F 9A-1P)
Left a voicemail with patient's outpatient psychiatrist, Chester Schmid at [phone] (per voicemail provider is only available M-F 9A-1P)
Left a voicemail with patient's outpatient psychiatrist, Chester Schmid at [phone] (per voicemail provider is only available M-F 9A-1P)
Chester Schmid at [phone] (per voicemail provider is only available M-F 9A-1P)
Left a voicemail with patient's outpatient psychiatrist, Chester Schmid at [phone] (per voicemail provider is only available M-F 9A-1P)
Chester Schmid at [phone] (per voicemail provider is only available M-F 9A-1P)
Chester Schmid at [phone] (per voicemail provider is only available M-F 9A-1P)
Left a voicemail with patient's outpatient psychiatrist, Chester Schmid at [phone] (per voicemail provider is only available M-F 9A-1P)
Left a voicemail with patient's outpatient psychiatrist, Chester Schmid at [phone] (per voicemail provider is only available M-F 9A-1P)
Chester Scmhid at [phone] (per voicemail provider is only available M-F 9A-1P)
Left a voicemail with patient's outpatient psychiatrist, Chester Schmid at [phone] (per voicemail provider is only available M-F 9A-1P)
Left a voicemail with patient's outpatient psychiatrist, Chester Schmid at [phone] (per voicemail provider is only available M-F 9A-1P)
Left a voicemail with patient's outpatient psychiatrist, Chester Schmid at [phone] (per voicemail provider is only available M-F 9A-1P)
Chester Schmid at [phone] (per voicemail provider is only available M-F 9A-1P)
Left a voicemail with patient's outpatient psychiatrist, Chester Schmid at [phone] (per voicemail provider is only available M-F 9A-1P)
Chester Schmid at [phone] (per voicemail provider is only available M-F 9A-1P)
Left a voicemail with patient's outpatient psychiatrist, Chester Schmid at [phone] (per voicemail provider is only available M-F 9A-1P)
Chester Schmid at [phone] (per voicemail provider is only available M-F 9A-1P)
Chester Schmid at [phone] (per voicemail provider is only available M-F 9A-1P)
Chester Schmid at [phone] (per voicemail provider is only available M-F 9A-1P)
Chester Schmid at [phone] (per voicemail provider is only available M-F 9A-1P)
Chester Schmid at [phone] (per voicemail provider is only available M-F 9A-1P)
Chester Schmid at [phone] (per voicemail provider is only available M-F 9A-1P)

## 2024-04-25 NOTE — BH INPATIENT PSYCHIATRY PROGRESS NOTE - NSTXMEDICPROGRES_PSY_ALL_CORE
Improving

## 2024-04-25 NOTE — BH INPATIENT PSYCHIATRY PROGRESS NOTE - NSTXDCOTHRDATETRGT_PSY_ALL_CORE
09-Apr-2024
08-Apr-2024
15-Apr-2024
30-Apr-2024
02-Apr-2024
15-Apr-2024
02-Apr-2024
30-Apr-2024
01-Apr-2024
23-Apr-2024
02-Apr-2024
30-Apr-2024
15-Apr-2024
23-Apr-2024
23-Apr-2024
22-Apr-2024
02-Apr-2024
29-Apr-2024
09-Apr-2024
16-Apr-2024
08-Apr-2024
16-Apr-2024
22-Apr-2024
09-Apr-2024

## 2024-04-25 NOTE — BH INPATIENT PSYCHIATRY PROGRESS NOTE - NSBHPSYCHOLCOGORIENT_PSY_A_CORE
Oriented to time, place, person, situation
no abdominal pain, no bloating, no constipation, no diarrhea, no nausea and no vomiting.
Oriented to time, place, person, situation

## 2024-04-25 NOTE — BH INPATIENT PSYCHIATRY PROGRESS NOTE - NSBHMSEPERCEPT_PSY_A_CORE
No abnormalities

## 2024-04-25 NOTE — BH INPATIENT PSYCHIATRY PROGRESS NOTE - NSTXPROBMEDIC_PSY_ALL_CORE
MEDICATION/TREATMENT NON-COMPLIANCE

## 2024-04-25 NOTE — BH INPATIENT PSYCHIATRY PROGRESS NOTE - NSBHMETABOLICLABS_PSY_ALL_CORE
Labs within last 12 months
Communicable/Infectious
Labs within last 12 months

## 2024-04-25 NOTE — BH INPATIENT PSYCHIATRY PROGRESS NOTE - NSTXDEPRESPROGRES_PSY_ALL_CORE
Improving
No Change
Improving
Improving
No Change
Improving
No Change
Improving
No Change
No Change
Improving
No Change

## 2024-04-25 NOTE — BH INPATIENT PSYCHIATRY PROGRESS NOTE - NSTXPROBANX_PSY_ALL_CORE
ANXIETY/PANIC/FEAR

## 2024-04-25 NOTE — BH INPATIENT PSYCHIATRY PROGRESS NOTE - NSBHFUPINTERVALHXFT_PSY_A_CORE
Patient seen and evaluated for management of MDD and anxiety in patient's room. Case discussed with treatment team, chart is reviewed.  No issues overnight Patient NPO for ECT #8 today. VSS. No events overnight. Patient reports mood is "better", denies depression.  She continues to feel better than when she arrived but anxious about going home and if she will be able to perform as required. Patient is looking forward to getting her hair done. Discussed continuing ECT upon discharge, feels it has been helpful. Next ECT will be Friday. Patient's ambulation has improved, PT evaluated today. Denies medication side effects, including nausea, constipation, headache, dizziness. Denies SI/I/P.  No somatic complaints.  No agitation and in good behavioral control.    Spoke with Kevin, , inquired about discharge and looking to obtain information for outpatient provider. Informed about Discharge on Friday with plan for ambulatory ECT. He was in agreement.  Patient seen and evaluated for management of MDD and anxiety in patient's room. Case discussed with treatment team, chart is reviewed.  No issues overnight. VSS. No events overnight. Patient reports mood is "better", denies depression.  She continues to feel better than when she arrived but anxious about going home.  Discussed continuing ECT upon discharge, feels it has been helpful. Next ECT will be tomorrow, hopeful about planned discharge tomorrow after.  Patient's ambulation has improved. Denies medication side effects, including nausea, constipation, headache, dizziness. Denies SI/I/P.  No somatic complaints.  No agitation and in good behavioral control.    Spoke with Kevin, , inquired about discharge and looking to obtain information for outpatient provider. Informed about Discharge on Friday with plan for ambulatory ECT. He was in agreement.

## 2024-04-26 ENCOUNTER — OUTPATIENT (OUTPATIENT)
Dept: OUTPATIENT SERVICES | Facility: HOSPITAL | Age: 69
LOS: 1 days | Discharge: HOME | End: 2024-04-26
Payer: MEDICARE

## 2024-04-26 VITALS
RESPIRATION RATE: 19 BRPM | SYSTOLIC BLOOD PRESSURE: 151 MMHG | DIASTOLIC BLOOD PRESSURE: 77 MMHG | TEMPERATURE: 98 F | OXYGEN SATURATION: 98 % | HEART RATE: 83 BPM

## 2024-04-26 PROCEDURE — 90870 ELECTROCONVULSIVE THERAPY: CPT

## 2024-04-26 RX ADMIN — LOSARTAN POTASSIUM 50 MILLIGRAM(S): 100 TABLET, FILM COATED ORAL at 08:56

## 2024-04-26 RX ADMIN — AMLODIPINE BESYLATE 2.5 MILLIGRAM(S): 2.5 TABLET ORAL at 08:56

## 2024-04-26 RX ADMIN — Medication 40 MILLIGRAM(S): at 08:56

## 2024-04-26 RX ADMIN — Medication 81 MILLIGRAM(S): at 08:56

## 2024-04-26 RX ADMIN — Medication 10 MILLIGRAM(S): at 08:56

## 2024-04-26 NOTE — ECT PRE-PROCEDURE CHECKLIST - NSPTSENTVIA_PSY_ALL_CORE
wheelchair

## 2024-04-26 NOTE — ECT TREATMENT NOTE - NSECTCPTCODE_PSY_ALL_CORE
78351 (ECT-Electroconvulsive Therapy)
68294 (ECT-Electroconvulsive Therapy)
15133 (ECT-Electroconvulsive Therapy)
85472 (ECT-Electroconvulsive Therapy)
60206 (ECT-Electroconvulsive Therapy)
86984 (ECT-Electroconvulsive Therapy)
87196 (ECT-Electroconvulsive Therapy)
62239 (ECT-Electroconvulsive Therapy)
92921 (ECT-Electroconvulsive Therapy)

## 2024-04-26 NOTE — ECT PRE-PROCEDURE CHECKLIST - NSPTSENTTO_PSY_ALL_CORE
Alert/Awake
procedural room

## 2024-04-26 NOTE — ECT TREATMENT NOTE - NSECTPOSTTXSUMMARY_PSY_ALL_CORE
The patient had a well modified grand mal seizure under general anesthesia and muscle relaxant. The patient is alert, responsive, in no acute distress.  Recovery uneventful. 

## 2024-04-26 NOTE — ECT TREATMENT NOTE - NSECTOTHERCOMMENT_PSY_ALL_CORE
s/p retinal hemorrhage laser repair <1 year

## 2024-04-26 NOTE — ECT TREATMENT NOTE - NSECTTXPERFDATETIME_PSY_ALL_CORE
15-Apr-2024 13:55
10-Apr-2024 14:52
22-Apr-2024 15:46
19-Apr-2024 12:12
03-Apr-2024 13:20
26-Apr-2024 11:48
05-Apr-2024 15:11
12-Apr-2024 13:25
08-Apr-2024 17:07

## 2024-04-26 NOTE — ECT TREATMENT NOTE - NSECTROSNEGAT_PSY_ALL_CORE
Review of Systems negative/unchanged from previous exam except as noted below

## 2024-04-26 NOTE — ECT PRE-PROCEDURE CHECKLIST - ALLERGIES
Allergies:-  erythromycin      

## 2024-04-26 NOTE — ECT TREATMENT NOTE - NSECTCARDIOCOMMENT_PSY_ALL_CORE
HLD, HTN, 

## 2024-04-26 NOTE — ECT PRE-PROCEDURE CHECKLIST - PATIENT'S SEXUAL ORIENTATION
Heterosexual

## 2024-04-26 NOTE — ECT PRE-PROCEDURE CHECKLIST - HAND OFF
Holding RN to OR RN
Holding RN to OR RN
Unit RN to OR RN/Holding RN to OR RN
Holding RN to OR RN
Unit RN to OR RN/Holding RN to OR RN
Unit RN to OR RN/Holding RN to OR RN
Holding RN to OR RN
Unit RN to OR RN/Holding RN to OR RN
Unit RN to OR RN/Holding RN to OR RN

## 2024-04-26 NOTE — ECT PRE-PROCEDURE CHECKLIST - TO WHOM
treating rn
procedure room RN
procedure room RN
treating rn
procedure room RN

## 2024-04-26 NOTE — ECT TREATMENT NOTE - NSECTPOSTTXCOMMENTS_PSY_ALL_CORE
Patient c/o feeling "foggy"  - urged patient to d/w primary team. 
Consider delay for subsequent treatment.

## 2024-04-26 NOTE — ECT PRE-PROCEDURE CHECKLIST - NSPROEDALEARNPREF_GEN_A_NUR
individual instruction/verbal instruction

## 2024-04-26 NOTE — ECT TREATMENT NOTE - NSECTREVSYS_PSY_ALL_CORE
Cardiovascular/Pulmonary/Abdominal/Metabolic/Other

## 2024-04-26 NOTE — ECT TREATMENT NOTE - NSECTPTEVAL_PSY_ALL_CORE
Patient evaluated and History and Physical reviewed prior to ECT. There are no significant changes to the patient's condition unless specified.

## 2024-04-26 NOTE — ECT PRE-PROCEDURE CHECKLIST - CAREGIVER PHONE NUMBER
[phone]
[phone]
[phone]
[phone]
[phone]
[phone]
[phone]
[phone]
[phone]
[phone]
[phone]

## 2024-04-26 NOTE — ECT TREATMENT NOTE - NSICDXBHSECONDARYDX_PSY_ALL_CORE
Severe episode of recurrent major depressive disorder, without psychotic features   F33.2  Anxiety   F41.9  
Anxiety   F41.9  
Severe episode of recurrent major depressive disorder, without psychotic features   F33.2  Anxiety   F41.9

## 2024-04-26 NOTE — ECT TREATMENT NOTE - NSECTIMPPLAN_PSY_ALL_CORE
Assessment today offers no contraindications to continue plan of treatment with ECT.
Assessment today offers no contraindications to continue plan of treatment with ECT.
ECT Cancelled for today - evaluation reveals:
Assessment today offers no contraindications to continue plan of treatment with ECT.

## 2024-05-03 VITALS
SYSTOLIC BLOOD PRESSURE: 172 MMHG | DIASTOLIC BLOOD PRESSURE: 85 MMHG | TEMPERATURE: 98 F | HEART RATE: 78 BPM | OXYGEN SATURATION: 99 % | RESPIRATION RATE: 16 BRPM

## 2024-05-03 PROCEDURE — 90870 ELECTROCONVULSIVE THERAPY: CPT

## 2024-05-03 NOTE — ECT TREATMENT NOTE - NSECTCOMMENTS_PSY_ALL_CORE
Patient appears anxious and states she has been "jittery" and in bed all week. She saw her psychiatrist two days ago, who suggested that perhaps her jitteriness is due to ECT. She attributes her jitteriness to ECT. She noted that her depression is improving, but was unable or unwilling to quantify or give specifics. Her sleep is fair and energy levels low, appetite ok, denied suicidal ideation. Reached  by phone but he was unable to discuss as he was in a public place; educated  to remain on campus during treatments. As per the treatment plan signed by the patient we will continue with acute ECT.

## 2024-05-08 PROCEDURE — 90870 ELECTROCONVULSIVE THERAPY: CPT

## 2024-05-08 NOTE — ECT TREATMENT NOTE - NSECTCOMMENTS_PSY_ALL_CORE
Patient reports feeling anxious.  She reports she feels no better with ECT but when probed further, states she does not have significant depressed mood any longer and just anxiety.  Continues to have issues with sleep.  Cannot elicit other depressive symptoms.  Denies SI or passive death wish.  As per the treatment plan signed by the patient we will continue with acute ECT.

## 2024-05-09 DIAGNOSIS — F33.2 MAJOR DEPRESSIVE DISORDER, RECURRENT SEVERE WITHOUT PSYCHOTIC FEATURES: ICD-10-CM

## 2024-05-10 VITALS
RESPIRATION RATE: 17 BRPM | TEMPERATURE: 98 F | OXYGEN SATURATION: 100 % | SYSTOLIC BLOOD PRESSURE: 142 MMHG | DIASTOLIC BLOOD PRESSURE: 79 MMHG | HEART RATE: 93 BPM

## 2024-05-10 PROCEDURE — 90870 ELECTROCONVULSIVE THERAPY: CPT

## 2024-05-10 NOTE — ECT TREATMENT NOTE - NSECTROSNEGAT_PSY_ALL_CORE
Review of Systems negative/unchanged from previous exam except as noted below

## 2024-05-10 NOTE — ECT TREATMENT NOTE - NSICDXBHPRIMARYDX_PSY_ALL_CORE
Severe episode of recurrent major depressive disorder, without psychotic features   F33.2  

## 2024-05-10 NOTE — ECT AMBULATORY DISCHARGE PLAN - NSPOSTECTCONTPROV_PSY_ALL_CORE
[facility] ([facility]) ECT Suite at [phone]
[facility] ([facility]) ECT Suite at [phone]

## 2024-05-10 NOTE — ECT TREATMENT NOTE - NSECTFOCPELUNGS_PSY_ALL_CORE
Unremarkable
Rhofade Counseling: Rhofade is a topical medication which can decrease superficial blood flow where applied. Side effects are uncommon and include stinging, redness and allergic reactions.

## 2024-05-10 NOTE — ECT AMBULATORY DISCHARGE PLAN - NSPOSTECTRESTRIC_PSY_ALL_CORE
Drive a car, operate power tools or machinery./Drink alcohol, beer, or wine./Make important personal and business decisions./If you have had any type of sedation, you may experience lightheadedness, dizziness, or sleepiness following your procedure.  A responsible adult should stay with you for at least 24 hours following your procedure.

## 2024-05-10 NOTE — ECT TREATMENT NOTE - NSECTREVSYS_PSY_ALL_CORE
Cardiovascular/Pulmonary/Abdominal/Metabolic

## 2024-05-10 NOTE — ECT PRE-PROCEDURE CHECKLIST - NSPROEDALEARNPREF_GEN_A_NUR
individual instruction/verbal instruction

## 2024-05-10 NOTE — ECT AMBULATORY DISCHARGE PLAN - NSPOSTECTPOSSCOMP_PSY_ALL_CORE
Headache, nausea, general body aches are common experiences after this treatment.  These may be treated with over-the-counter pain medication.

## 2024-05-10 NOTE — ECT AMBULATORY DISCHARGE PLAN - NSDCPEFALRISK_GEN_ALL_CORE
For information on Fall & Injury Prevention, visit: https://www.[facility]/news/fall-prevention-protects-and-maintains-health-and-mobility OR  https://www.[facility]/news/fall-prevention-tips-to-avoid-injury OR  https://www.cdc.gov/steadi/patient.html
For information on Fall & Injury Prevention, visit: https://www.[facility]/news/fall-prevention-protects-and-maintains-health-and-mobility OR  https://www.[facility]/news/fall-prevention-tips-to-avoid-injury OR  https://www.cdc.gov/steadi/patient.html
For information on Fall & Injury Prevention, visit: https://www.[facility]/news/fall-prevention-protects-and-maintains-health-and-mobility OR  https://www.[facility]/news/fall-prevention-tips-to-avoid-injury OR  https://www.cdc.gov/steadi/patient.html

## 2024-05-10 NOTE — ECT AMBULATORY DISCHARGE PLAN - NSPOSTECTSMOKING_PSY_ALL_CORE
If you are a smoker, it is important for your health to stop smoking.  Please be aware that second hand smoke is also harmful.

## 2024-05-10 NOTE — ECT AMBULATORY DISCHARGE PLAN - NSPOSTECTCALLBEFORE_PSY_ALL_CORE
[facility] ([facility]) scheduling office at [phone]
[facility] ([facility]) scheduling office at [phone]
[facility] ([facility]) scheduling office at [phone]

## 2024-05-10 NOTE — ECT AMBULATORY DISCHARGE PLAN - NSPOSTECTPROVEDUCFT_PSY_ALL_CORE
Discharge Instructions Reinforced
ECT Instruction 
post ECT discharge instructions, covid education

## 2024-05-10 NOTE — ECT TREATMENT NOTE - NSECTCPTCODE_PSY_ALL_CORE
04935 (ECT-Electroconvulsive Therapy)
67984 (ECT-Electroconvulsive Therapy)
89886 (ECT-Electroconvulsive Therapy)

## 2024-05-10 NOTE — ECT AMBULATORY DISCHARGE PLAN - NSPOSTECTWORSEPSYCHSX_PSY_ALL_CORE
If you are experiencing heightened or worsening psychological symptoms please contact your private psychiatrist.

## 2024-05-10 NOTE — ECT AMBULATORY DISCHARGE PLAN - NSPOSTECTCALLZHH_PSY_ALL_CORE
[facility]        If you are experiencing any symptoms after discharge, please follow up with your doctor for further instructions.    If you are more than 1 hour late to your scheduled appointment, the appointment will be canceled and rescheduled.    If you are running late to your appointment, please call the phone number listed below to inform us.    [facility]   [address] [place] [address]     Hours of Operation  Monday, Wednesday, Thursday: 7:00 am-7:00 pm  Tuesday, Friday: 7:00 am- 5:30 pm  Saturday 8:00am- 11:30am     Infusion Scheduling  P:[phone]  F: [phone]       **If your infusion requires blood work be sure to have labs drawn 24-48 hrs prior to your scheduled appointment **       [facility] Lab Hours: Located On The First Floor   Walk in or by appointment  Call Central Scheduling ([phone]) for lab appointment  Monday-Friday: 6:00 am-6:30 pm  Saturday: 6:00 am- 2:30 pm   Sunday: Closed       Gely Trotter: Manager [facility] Center [phone]     Jil Casas: Assistant Clinical Manager [facility]: [phone]   
Call the [facility] ECT suite at [phone]
Call the [facility] ECT suite at [phone]

## 2024-05-10 NOTE — ECT PRE-PROCEDURE CHECKLIST - NSECTCONSENT_PSY_ALL_CORE
ECT consent dated 03/26/24   anesthesia consent expires 10/01/24/yes

## 2024-05-10 NOTE — ECT AMBULATORY DISCHARGE PLAN - NSPOSTECTDIET_PSY_ALL_CORE
Gradually resume your regular diet/Increase fluids

## 2024-05-10 NOTE — ECT TREATMENT NOTE - NSECTCOMMENTS_PSY_ALL_CORE
Ms. Ramirez reports doing well. Sleep: normal, appetite: good. Energy: low. Denies feeling depressed. Continues to have anxiety about the treatment. Denies any death wish/suicidal ideation/intent/plan.     Discussed risk vs benefits of the treatment and addressed some of the automatic thoughts that patient experiences about the risk of ECT.      reports that she does not want to go out. Sleep: normal, appetite: improved. Energy: low, she has restlessness. She doesn't do much except for watching TV or laying down. She is at times able to laugh at things  Ms. Ramirez reports doing well. Sleep: normal, appetite: good. Energy: low. Denies feeling depressed. Continues to have anxiety about the treatment. Denies any death wish/suicidal ideation/intent/plan.     Discussed risk vs benefits of the treatment and addressed some of the automatic thoughts that patient experiences about the risk of ECT.      reports that she does not want to go out. Sleep: normal, appetite: improved. Energy: low, she has restlessness. She doesn't do much except for watching TV or laying down. She is at times able to laugh at things.     Recommended that patient should see her psychiatrist next week. We will increase the interval to once a week.

## 2024-05-10 NOTE — ECT PRE-PROCEDURE CHECKLIST - HAND OFF
Unit RN to OR RN/Holding RN to OR RN

## 2024-05-10 NOTE — ECT AMBULATORY DISCHARGE PLAN - PATIENT PORTAL LINK FT
You can access the FollowMyHealth Patient Portal offered by [facility] by registering at the following website: [email or web address]. By joining [employer]’s FollowMyHealth portal, you will also be able to view your health information using other applications (apps) compatible with our system.
You can access the FollowMyHealth Patient Portal offered by [facility] by registering at the following website: [email or web address]. By joining [employer]’s FollowMyHealth portal, you will also be able to view your health information using other applications (apps) compatible with our system.
You can access the FollowMyHealth Patient Portal offered by [facility] by registering at the following website: [email or web address]. By joining [employer]’s FollowMyHealth portal, you will also be able to view your health information using other applications (apps) compatible with our system.

## 2024-05-10 NOTE — ECT AMBULATORY DISCHARGE PLAN - NSPOSTECTCASEEMER_PSY_ALL_CORE
In case of any emergency, please go to the nearest emergency room

## 2024-05-10 NOTE — ECT TREATMENT NOTE - NSECTIMPPLAN_PSY_ALL_CORE
Assessment today offers no contraindications to continue plan of treatment with ECT.

## 2024-05-10 NOTE — ECT TREATMENT NOTE - NSSUICPROTFACT_PSY_ALL_CORE
Responsibility to children, family, or others/Identifies reasons for living/Supportive social network of family or friends/Fear of death or the actual act of killing self/Cultural, spiritual and/or moral attitudes against suicide

## 2024-05-10 NOTE — ECT AMBULATORY DISCHARGE PLAN - NSPOSTECTSYMPTOMS_PSY_ALL_CORE
Excessive Diarrhea/Fever/Inability to tolerate liquids or foods/Increased irritability or sluggishness/Nausea and vomiting that does not stop/Pain not relieved by medications/Unable to urinate

## 2024-07-23 NOTE — BH DISCHARGE NOTE NURSING/SOCIAL WORK/PSYCH REHAB - DISCHARGE INSTRUCTIONS AFTERCARE APPOINTMENTS
Patient/Caregiver requests family/friend to interpret. In order to check the location, date, or time of your aftercare appointment, please refer to your Discharge Instructions Document given to you upon leaving the hospital.  If you have lost the instructions please call [phone] English

## 2024-09-19 NOTE — ECT OUTPATIENT PROGRAM DISCHARGE SUMMARY - NSECTTREATMENTSUMMARY_PSY_ALL_CORE
Ms. Ramirez received 9 bifrontal ECT treatments as inpatient and the continue the acute course of ECT as outpatient. She tolerated the treatments well, but used to have anxiety about the treatments despite psychoeducation. After a total of 12 ECT treatments, while her depression was significantly improved, she continued to watch TV or lay down at home and avoided going out. She was advised to discuss with psychiatrist as to whether she should continue ECT and was given a weekly appointment. She decided to discontinue ECT. At last ECT, her cognition score was 9/10 (it was 8/10 at first ECT, 10 being the highest score).

## 2024-11-05 ENCOUNTER — RX ONLY (RX ONLY)
Age: 69
End: 2024-11-05

## 2024-11-05 ENCOUNTER — DOCTOR'S OFFICE (OUTPATIENT)
Facility: LOCATION | Age: 69
Setting detail: OPHTHALMOLOGY
End: 2024-11-05
Payer: MEDICARE

## 2024-11-05 DIAGNOSIS — H01.001: ICD-10-CM

## 2024-11-05 DIAGNOSIS — H01.004: ICD-10-CM

## 2024-11-05 DIAGNOSIS — H16.223: ICD-10-CM

## 2024-11-05 DIAGNOSIS — H52.7: ICD-10-CM

## 2024-11-05 PROCEDURE — 92004 COMPRE OPH EXAM NEW PT 1/>: CPT | Performed by: STUDENT IN AN ORGANIZED HEALTH CARE EDUCATION/TRAINING PROGRAM

## 2024-11-05 ASSESSMENT — CONFRONTATIONAL VISUAL FIELD TEST (CVF)
OD_FINDINGS: FULL
OS_FINDINGS: FULL

## 2024-11-05 ASSESSMENT — REFRACTION_AUTOREFRACTION
OS_SPHERE: +1.00
OD_SPHERE: +1.25
OS_AXIS: 117
OD_AXIS: 094
OD_CYLINDER: -1.00
OS_CYLINDER: -0.75

## 2024-11-05 ASSESSMENT — SUPERFICIAL PUNCTATE KERATITIS (SPK)
OS_SPK: T
OD_SPK: T

## 2024-11-05 ASSESSMENT — REFRACTION_CURRENTRX
OS_VPRISM_DIRECTION: SV
OD_OVR_VA: 20/
OS_AXIS: 102
OS_CYLINDER: -0.75
OD_VPRISM_DIRECTION: SV
OS_OVR_VA: 20/
OD_AXIS: 086
OS_SPHERE: +2.50
OD_SPHERE: +2.50
OD_CYLINDER: -0.75

## 2024-11-05 ASSESSMENT — KERATOMETRY
OS_K2POWER_DIOPTERS: 44.50
OD_AXISANGLE_DEGREES: 90
OD_K2POWER_DIOPTERS: 44.75
OS_AXISANGLE_DEGREES: 112
OD_K1POWER_DIOPTERS: 42.75
OS_K1POWER_DIOPTERS: 42.75
METHOD_AUTO_MANUAL: AUTO

## 2024-11-05 ASSESSMENT — TEAR BREAK UP TIME (TBUT)
OD_TBUT: T
OS_TBUT: T

## 2024-11-05 ASSESSMENT — LID EXAM ASSESSMENTS
OD_BLEPHARITIS: RUL T
OS_BLEPHARITIS: LUL T

## 2024-11-05 ASSESSMENT — VISUAL ACUITY
OD_BCVA: 20/30
OS_BCVA: 20/30+2

## 2025-01-16 ENCOUNTER — DOCTOR'S OFFICE (OUTPATIENT)
Facility: LOCATION | Age: 70
Setting detail: OPHTHALMOLOGY
End: 2025-01-16
Payer: MEDICARE

## 2025-01-16 DIAGNOSIS — H52.7: ICD-10-CM

## 2025-01-16 DIAGNOSIS — H01.001: ICD-10-CM

## 2025-01-16 DIAGNOSIS — H16.223: ICD-10-CM

## 2025-01-16 DIAGNOSIS — H01.004: ICD-10-CM

## 2025-01-16 PROCEDURE — 92012 INTRM OPH EXAM EST PATIENT: CPT | Performed by: STUDENT IN AN ORGANIZED HEALTH CARE EDUCATION/TRAINING PROGRAM

## 2025-01-16 ASSESSMENT — SUPERFICIAL PUNCTATE KERATITIS (SPK)
OD_SPK: T
OS_SPK: T

## 2025-01-16 ASSESSMENT — KERATOMETRY
OS_K1POWER_DIOPTERS: 43.25
OS_K2POWER_DIOPTERS: 43.50
OD_K2POWER_DIOPTERS: 43.75
OD_K1POWER_DIOPTERS: 43.50
OD_AXISANGLE_DEGREES: 102
METHOD_AUTO_MANUAL: AUTO
OS_AXISANGLE_DEGREES: 088

## 2025-01-16 ASSESSMENT — REFRACTION_AUTOREFRACTION
OD_AXIS: 093
OS_CYLINDER: -0.75
OD_CYLINDER: -0.75
OD_SPHERE: +1.25
OS_AXIS: 093
OS_SPHERE: +1.25

## 2025-01-16 ASSESSMENT — REFRACTION_CURRENTRX
OD_AXIS: 086
OD_CYLINDER: -0.75
OS_SPHERE: +2.50
OD_OVR_VA: 20/
OD_SPHERE: +2.50
OS_AXIS: 102
OS_CYLINDER: -0.75
OS_VPRISM_DIRECTION: SV
OS_OVR_VA: 20/
OD_VPRISM_DIRECTION: SV

## 2025-01-16 ASSESSMENT — TEAR BREAK UP TIME (TBUT)
OD_TBUT: T
OS_TBUT: T

## 2025-01-16 ASSESSMENT — VISUAL ACUITY
OS_BCVA: 20/40-
OD_BCVA: 20/60-

## 2025-01-16 ASSESSMENT — LID EXAM ASSESSMENTS
OD_BLEPHARITIS: RUL T
OS_BLEPHARITIS: LUL T

## 2025-01-16 ASSESSMENT — CONFRONTATIONAL VISUAL FIELD TEST (CVF)
OD_FINDINGS: FULL
OS_FINDINGS: FULL

## 2025-08-04 ENCOUNTER — DOCTOR'S OFFICE (OUTPATIENT)
Facility: LOCATION | Age: 70
Setting detail: OPHTHALMOLOGY
End: 2025-08-04
Payer: MEDICARE

## 2025-08-04 ENCOUNTER — OFFICE (OUTPATIENT)
Dept: URBAN - METROPOLITAN AREA CLINIC 12 | Facility: CLINIC | Age: 70
Setting detail: OPHTHALMOLOGY
End: 2025-08-04
Payer: MEDICARE

## 2025-08-04 DIAGNOSIS — H16.223: ICD-10-CM

## 2025-08-04 DIAGNOSIS — H02.89: ICD-10-CM

## 2025-08-04 DIAGNOSIS — H01.001: ICD-10-CM
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
## 2025-08-04 DIAGNOSIS — H01.004: ICD-10-CM

## 2025-08-04 DIAGNOSIS — H34.8312: ICD-10-CM

## 2025-08-04 PROCEDURE — 55555 MISCELLANEOUS CHARGES: CPT | Performed by: OPHTHALMOLOGY

## 2025-08-04 PROCEDURE — 99213 OFFICE O/P EST LOW 20 MIN: CPT | Performed by: OPHTHALMOLOGY

## 2025-08-04 ASSESSMENT — KERATOMETRY
OS_K2POWER_DIOPTERS: 43.00
OD_K2POWER_DIOPTERS: 43.00
OS_K1POWER_DIOPTERS: 42.75
OS_AXISANGLE_DEGREES: 068
METHOD_AUTO_MANUAL: AUTO
OD_K1POWER_DIOPTERS: 43.00
OD_AXISANGLE_DEGREES: 090

## 2025-08-04 ASSESSMENT — TEAR BREAK UP TIME (TBUT)
OS_TBUT: T
OD_TBUT: T

## 2025-08-04 ASSESSMENT — VISUAL ACUITY
OS_BCVA: 20/40
OD_BCVA: 20/40+2

## 2025-08-04 ASSESSMENT — LID EXAM ASSESSMENTS
OD_MEIBOMITIS: RLL
OS_MEIBOMITIS: LLL
OS_BLEPHARITIS: LUL T
OD_BLEPHARITIS: RUL T

## 2025-08-04 ASSESSMENT — REFRACTION_CURRENTRX
OD_OVR_VA: 20/
OS_CYLINDER: -0.75
OS_OVR_VA: 20/
OD_AXIS: 086
OS_SPHERE: +2.50
OS_AXIS: 102
OD_VPRISM_DIRECTION: SV
OD_SPHERE: +2.50
OD_CYLINDER: -0.75
OS_VPRISM_DIRECTION: SV

## 2025-08-04 ASSESSMENT — DRY EYES - PHYSICIAN NOTES
OS_GENERALCOMMENTS: INCREASE TEAR LAKE
OD_GENERALCOMMENTS: INCREASE TEAR LAKE

## 2025-08-04 ASSESSMENT — REFRACTION_AUTOREFRACTION
OD_CYLINDER: -0.50
OS_SPHERE: +1.25
OD_AXIS: 080
OS_AXIS: 093
OD_SPHERE: +1.75
OS_CYLINDER: -0.25

## 2025-08-04 ASSESSMENT — CONFRONTATIONAL VISUAL FIELD TEST (CVF)
OD_FINDINGS: FULL
OS_FINDINGS: FULL

## 2025-08-04 ASSESSMENT — SUPERFICIAL PUNCTATE KERATITIS (SPK)
OD_SPK: 1+
OS_SPK: 1+